# Patient Record
Sex: MALE | Race: WHITE | NOT HISPANIC OR LATINO | Employment: FULL TIME | URBAN - METROPOLITAN AREA
[De-identification: names, ages, dates, MRNs, and addresses within clinical notes are randomized per-mention and may not be internally consistent; named-entity substitution may affect disease eponyms.]

---

## 2017-02-07 PROBLEM — J01.00 ACUTE MAXILLARY SINUSITIS: Status: ACTIVE | Noted: 2017-02-07

## 2017-02-08 PROBLEM — H66.001 ACUTE SUPPURATIVE OTITIS MEDIA OF RIGHT EAR WITHOUT SPONTANEOUS RUPTURE OF TYMPANIC MEMBRANE: Status: ACTIVE | Noted: 2017-02-08

## 2017-05-15 PROBLEM — J01.00 ACUTE MAXILLARY SINUSITIS: Status: RESOLVED | Noted: 2017-02-07 | Resolved: 2017-05-15

## 2017-11-14 ENCOUNTER — OFFICE VISIT (OUTPATIENT)
Dept: FAMILY MEDICINE | Facility: CLINIC | Age: 62
End: 2017-11-14
Payer: COMMERCIAL

## 2017-11-14 ENCOUNTER — HOSPITAL ENCOUNTER (OUTPATIENT)
Dept: RADIOLOGY | Facility: HOSPITAL | Age: 62
Discharge: HOME | End: 2017-11-14
Attending: INTERNAL MEDICINE
Payer: COMMERCIAL

## 2017-11-14 VITALS
WEIGHT: 274.06 LBS | HEART RATE: 76 BPM | DIASTOLIC BLOOD PRESSURE: 88 MMHG | BODY MASS INDEX: 36.32 KG/M2 | SYSTOLIC BLOOD PRESSURE: 126 MMHG | TEMPERATURE: 98 F | HEIGHT: 73 IN

## 2017-11-14 DIAGNOSIS — J30.89 CHRONIC NON-SEASONAL ALLERGIC RHINITIS, UNSPECIFIED TRIGGER: ICD-10-CM

## 2017-11-14 DIAGNOSIS — M19.049 HAND ARTHRITIS: ICD-10-CM

## 2017-11-14 DIAGNOSIS — Z00.00 PREVENTATIVE HEALTH CARE: ICD-10-CM

## 2017-11-14 DIAGNOSIS — E66.01 MORBID OBESITY: ICD-10-CM

## 2017-11-14 DIAGNOSIS — E29.1 HYPOGONADISM IN MALE: ICD-10-CM

## 2017-11-14 DIAGNOSIS — I10 ESSENTIAL HYPERTENSION: ICD-10-CM

## 2017-11-14 DIAGNOSIS — E78.2 MIXED HYPERLIPIDEMIA: Primary | ICD-10-CM

## 2017-11-14 DIAGNOSIS — R94.31 ABNORMAL EKG: ICD-10-CM

## 2017-11-14 DIAGNOSIS — Z90.49 HISTORY OF PARTIAL COLECTOMY: ICD-10-CM

## 2017-11-14 DIAGNOSIS — K21.9 GASTROESOPHAGEAL REFLUX DISEASE, ESOPHAGITIS PRESENCE NOT SPECIFIED: ICD-10-CM

## 2017-11-14 DIAGNOSIS — Z23 IMMUNIZATION DUE: ICD-10-CM

## 2017-11-14 DIAGNOSIS — F41.9 CHRONIC ANXIETY: ICD-10-CM

## 2017-11-14 PROCEDURE — 90471 IMMUNIZATION ADMIN: CPT | Mod: S$GLB,,, | Performed by: INTERNAL MEDICINE

## 2017-11-14 PROCEDURE — 73130 X-RAY EXAM OF HAND: CPT | Mod: 50,TC

## 2017-11-14 PROCEDURE — 99999 PR PBB SHADOW E&M-NEW PATIENT-LVL III: CPT | Mod: PBBFAC,,, | Performed by: INTERNAL MEDICINE

## 2017-11-14 PROCEDURE — 99204 OFFICE O/P NEW MOD 45 MIN: CPT | Mod: 25,S$GLB,, | Performed by: INTERNAL MEDICINE

## 2017-11-14 PROCEDURE — 90686 IIV4 VACC NO PRSV 0.5 ML IM: CPT | Mod: S$GLB,,, | Performed by: INTERNAL MEDICINE

## 2017-11-14 PROCEDURE — 90715 TDAP VACCINE 7 YRS/> IM: CPT | Mod: S$GLB,,, | Performed by: INTERNAL MEDICINE

## 2017-11-14 PROCEDURE — 73130 X-RAY EXAM OF HAND: CPT | Mod: 26,50,, | Performed by: RADIOLOGY

## 2017-11-14 PROCEDURE — 90472 IMMUNIZATION ADMIN EACH ADD: CPT | Mod: S$GLB,,, | Performed by: INTERNAL MEDICINE

## 2017-11-14 RX ORDER — SERTRALINE HYDROCHLORIDE 100 MG/1
100 TABLET, FILM COATED ORAL DAILY
Qty: 30 TABLET | Refills: 5 | Status: SHIPPED | OUTPATIENT
Start: 2017-11-14 | End: 2018-02-15 | Stop reason: SDUPTHER

## 2017-11-14 RX ORDER — PANTOPRAZOLE SODIUM 40 MG/1
40 TABLET, DELAYED RELEASE ORAL DAILY
Qty: 30 TABLET | Refills: 11 | Status: SHIPPED | OUTPATIENT
Start: 2017-11-14 | End: 2018-02-15 | Stop reason: SDUPTHER

## 2017-11-14 RX ORDER — LISINOPRIL AND HYDROCHLOROTHIAZIDE 20; 25 MG/1; MG/1
1 TABLET ORAL DAILY
Qty: 30 TABLET | Refills: 5 | Status: SHIPPED | OUTPATIENT
Start: 2017-11-14 | End: 2018-02-15 | Stop reason: SDUPTHER

## 2017-11-14 RX ORDER — METOPROLOL SUCCINATE 50 MG/1
50 TABLET, EXTENDED RELEASE ORAL NIGHTLY
Qty: 30 TABLET | Refills: 5 | Status: SHIPPED | OUTPATIENT
Start: 2017-11-14 | End: 2018-02-15 | Stop reason: SDUPTHER

## 2017-11-14 RX ORDER — ATORVASTATIN CALCIUM 40 MG/1
40 TABLET, FILM COATED ORAL DAILY
Qty: 30 TABLET | Refills: 11 | Status: SHIPPED | OUTPATIENT
Start: 2017-11-14 | End: 2018-02-15 | Stop reason: SDUPTHER

## 2017-11-14 RX ORDER — ATENOLOL 50 MG/1
TABLET ORAL
COMMUNITY
Start: 2017-10-25 | End: 2017-11-14

## 2017-11-14 NOTE — PROGRESS NOTES
Assessment and Plan:    1. Mixed hyperlipidemia  - atorvastatin (LIPITOR) 40 MG tablet; Take 1 tablet (40 mg total) by mouth once daily.  Dispense: 30 tablet; Refill: 11    2. Gastroesophageal reflux disease, esophagitis presence not specified  - pantoprazole (PROTONIX) 40 MG tablet; Take 1 tablet (40 mg total) by mouth once daily.  Dispense: 30 tablet; Refill: 11    3. Essential hypertension  Controlled.   - Comprehensive metabolic panel; Future  - Comprehensive metabolic panel  - lisinopril-hydrochlorothiazide (PRINZIDE,ZESTORETIC) 20-25 mg Tab; Take 1 tablet by mouth once daily.  Dispense: 30 tablet; Refill: 5  - metoprolol succinate (TOPROL-XL) 50 MG 24 hr tablet; Take 1 tablet (50 mg total) by mouth every evening.  Dispense: 30 tablet; Refill: 5    4. Chronic anxiety  Well controlled  - sertraline (ZOLOFT) 100 MG tablet; Take 1 tablet (100 mg total) by mouth once daily.  Dispense: 30 tablet; Refill: 5    5. History of partial colectomy  2/2 diverticulitis, reports normal colonoscopy last year. Will obtain records.     6. Chronic non-seasonal allergic rhinitis, unspecified trigger  Continue OTC antihistamines    7. Abnormal EKG  Will obtain records, may need to refer to cardiology pending results.     8. Hypogonadism in male  Continue to follow up with Urologist, consider tapering off of testosterone.     9. Morbid obesity  Counseled extensively on need for diet changes and daily exercise.  Discussed examples of healthy eating.  Recommend at least 30 minutes of exercise at least 5 days a week.   - Comprehensive metabolic panel; Future  - Hemoglobin A1c; Future  - Comprehensive metabolic panel  - Hemoglobin A1c    10. [facility] health care  Had colonoscopy last year with colorectal surgeon, notes being told he needs to return in 5 years. Will send JODEE  - Comprehensive metabolic panel; Future  - Hemoglobin A1c; Future  - Hepatitis C antibody; Future  - Tdap Vaccine    11. Hand arthritis  Most likely OA, but  "with MCP swelling, some concern for inflammatory arthritis, though patient is not typical   - X-Ray Hand Complete Right; Future    12. Immunization due      ______________________________________________________________________  Subjective:    Chief Complaint:  Annual, establish care    HPI:  Nilesh is a 62 y.o. year old man, previously patient of Dr. Johnson at [facility], here to establish care.     He notes that he has been bothered by pain in the joints of his bilateral hands, mainly MCP joints, for the last ~3 months. He works a lot with his hands, and the pain is getting to the point that it is limiting his activity.     HTN- Takes metoprolol, lisinopril, and HCTZ, BP has been typically well controlled on this regimen.     HLD- Taking atorvastatin, doing well on this.     Anxiety- Taking sertraline and notes that he is doing very well on this.     GERD- Doing well on protonix, reports having an EGD last year with his GI doctor.      Allergic rhinosinusitis- Taking fexofenadine OTC and doing well on this medication.     Taking testosterone for about 6 years prescribed by his Urologist.     He has seen a cardiologist before for evaluation of an abnormal EKG. He notes that he has been told his EKG looks like he had a previous MI. He was evaluated by [facility] stress test which he was told was abnormal in some way and they recommended an angiogram. He reports that he had an angiogram and was told that he had some "plaque build up" but that there was nothing significant.       Past Medical History:  Past Medical History:   Diagnosis Date    Allergic rhinosinusitis     Anxiety     Diverticulitis     s/p partial colectomy    GERD (gastroesophageal reflux disease)     Hyperlipidemia     Hypertension     Hypogonadism in male        Past Surgical History:  Past Surgical History:   Procedure Laterality Date    COLECTOMY      UPPER GASTROINTESTINAL ENDOSCOPY         Family History:  Family History   Problem Relation Age " of Onset    Heart disease Mother     Heart disease Father     Heart disease Sister     Atrial fibrillation Sister     Heart disease Brother        Social History:  Social History     Social History    Marital status:      Spouse name: N/A    Number of children: N/A    Years of education: N/A     Social History Main Topics    Smoking status: Never Smoker    Smokeless tobacco: Never Used    Alcohol use 8.4 - 9.6 oz/week     14 - 16 Standard drinks or equivalent per week    Drug use: No    Sexual activity: Yes     Partners: Female     Other Topics Concern    None     Social History Narrative    Owns company making leather belts.        Medications:  Current Outpatient Prescriptions on File Prior to Visit   Medication Sig Dispense Refill    atorvastatin (LIPITOR) 40 MG tablet TAKE 1 TABLET BY MOUTH EVERY DAY 90 tablet 1    fexofenadine (ALLEGRA) 180 MG tablet Take 1 tablet (180 mg total) by mouth once daily. 90 tablet 1    lisinopril-hydrochlorothiazide (PRINZIDE,ZESTORETIC) 20-25 mg Tab Take 1 tablet by mouth once daily. 90 tablet 3    metoprolol succinate (TOPROL-XL) 50 MG 24 hr tablet TAKE 1 TABLET BY MOUTH EVERY EVENING 90 tablet 1    pantoprazole (PROTONIX) 40 MG tablet TAKE 1 TABLET BY MOUTH EVERY DAY 30 tablet 0    sertraline (ZOLOFT) 100 MG tablet Take 1 tablet (100 mg total) by mouth once daily. 90 tablet 3    testosterone cypionate (DEPOTESTOTERONE CYPIONATE) 200 mg/mL injection   1    [DISCONTINUED] guaifenesin (MUCINEX) 600 mg 12 hr tablet Take 1 tablet (600 mg total) by mouth 2 (two) times daily. 60 tablet 2     No current facility-administered medications on file prior to visit.        Allergies:  Flonase [fluticasone]    Immunizations:  Immunization History   Administered Date(s) Administered    Zoster 09/15/2016       Review of Systems:  Review of Systems   Constitutional: Negative for chills, fever and unexpected weight change.   HENT: Negative for congestion and trouble  "swallowing.    Eyes: Negative for pain and visual disturbance.   Respiratory: Negative for shortness of breath and wheezing.    Cardiovascular: Negative for chest pain and leg swelling.   Gastrointestinal: Negative for abdominal pain, constipation and diarrhea.   Endocrine: Negative for cold intolerance and heat intolerance.   Genitourinary: Negative for difficulty urinating and hematuria.   Musculoskeletal: Positive for arthralgias, back pain and joint swelling.   Skin: Negative for rash and wound.   Allergic/Immunologic: Negative for environmental allergies and food allergies.   Neurological: Negative for seizures and syncope.   Psychiatric/Behavioral: Negative for dysphoric mood. The patient is not nervous/anxious.        Objective:    Vitals:  Vitals:    11/14/17 1458   BP: 126/88   Pulse: 76   Temp: 98.1 °F (36.7 °C)   Weight: 124.3 kg (274 lb 0.5 oz)   Height: 6' 1" (1.854 m)   PainSc:   3   PainLoc: Back       Body mass index is 36.15 kg/m².      Physical Exam   Constitutional: He is oriented to person, place, and time. He appears well-developed and well-nourished. No distress.   HENT:   Mouth/Throat: Oropharynx is clear and moist.   Eyes: No scleral icterus.   Cardiovascular: Normal rate and regular rhythm.    No murmur heard.  Pulmonary/Chest: Effort normal and breath sounds normal. No respiratory distress. He has no wheezes.   Musculoskeletal: He exhibits no edema.   Bilateral hands reveal some swelling of MCP and DIP joints, pain with palpation of multiple MCP and DIP joints, less so with PIP joints   Neurological: He is alert and oriented to person, place, and time.   Skin: Skin is warm and dry.   Psychiatric: He has a normal mood and affect. His behavior is normal.   Vitals reviewed.      Data:  Previous labs reviewed and pertinent for elevated fasting glucose (111), elevated TG, good LDL.        Flavia Lange MD  Internal Medicine    "

## 2017-11-16 ENCOUNTER — TELEPHONE (OUTPATIENT)
Dept: FAMILY MEDICINE | Facility: CLINIC | Age: 62
End: 2017-11-16

## 2017-11-16 NOTE — TELEPHONE ENCOUNTER
----- Message from Alecia Smith RT sent at 11/16/2017 12:07 PM CST -----  Contact: pt    Called pod, pt , returned missed call, thanks.

## 2017-11-23 LAB
ALBUMIN SERPL-MCNC: 4.5 G/DL (ref 3.6–4.8)
ALBUMIN/GLOB SERPL: 2 {RATIO} (ref 1.2–2.2)
ALP SERPL-CCNC: 91 IU/L (ref 39–117)
ALT SERPL-CCNC: 33 IU/L (ref 0–44)
AST SERPL-CCNC: 27 IU/L (ref 0–40)
BILIRUB SERPL-MCNC: 0.3 MG/DL (ref 0–1.2)
BUN SERPL-MCNC: 19 MG/DL (ref 8–27)
BUN/CREAT SERPL: 18 (ref 10–24)
CALCIUM SERPL-MCNC: 9.2 MG/DL (ref 8.6–10.2)
CHLORIDE SERPL-SCNC: 97 MMOL/L (ref 96–106)
CO2 SERPL-SCNC: 28 MMOL/L (ref 18–29)
CREAT SERPL-MCNC: 1.07 MG/DL (ref 0.76–1.27)
GFR SERPLBLD CREATININE-BSD FMLA CKD-EPI: 73 ML/MIN/1.73
GFR SERPLBLD CREATININE-BSD FMLA CKD-EPI: 84 ML/MIN/1.73
GLOBULIN SER CALC-MCNC: 2.3 G/DL (ref 1.5–4.5)
GLUCOSE SERPL-MCNC: 103 MG/DL (ref 65–99)
HBA1C MFR BLD: 5.9 % (ref 4.8–5.6)
HCV AB S/CO SERPL IA: <0.1 S/CO RATIO (ref 0–0.9)
POTASSIUM SERPL-SCNC: 5.1 MMOL/L (ref 3.5–5.2)
PROT SERPL-MCNC: 6.8 G/DL (ref 6–8.5)
SODIUM SERPL-SCNC: 139 MMOL/L (ref 134–144)

## 2017-11-28 ENCOUNTER — PATIENT MESSAGE (OUTPATIENT)
Dept: FAMILY MEDICINE | Facility: CLINIC | Age: 62
End: 2017-11-28

## 2017-11-28 DIAGNOSIS — M54.40 ACUTE LOW BACK PAIN WITH SCIATICA, SCIATICA LATERALITY UNSPECIFIED, UNSPECIFIED BACK PAIN LATERALITY: Primary | ICD-10-CM

## 2017-11-30 ENCOUNTER — TELEPHONE (OUTPATIENT)
Dept: NEUROSURGERY | Facility: CLINIC | Age: 62
End: 2017-11-30

## 2017-11-30 NOTE — TELEPHONE ENCOUNTER
Spoke with patient and scheduled an appointment with Dr. Thomas for 12-4-17, and patient indicated understanding.

## 2017-11-30 NOTE — TELEPHONE ENCOUNTER
----- Message from Lisa Ivy sent at 11/30/2017 10:33 AM CST -----  Contact: Phil  Calling in regards to back and lumbar pain. He has had mri and other tests done. He has diagnosis, but cannot say them to me. Please call [phone] (home)   Thanks!

## 2017-12-04 ENCOUNTER — OFFICE VISIT (OUTPATIENT)
Dept: NEUROSURGERY | Facility: CLINIC | Age: 62
End: 2017-12-04
Payer: COMMERCIAL

## 2017-12-04 VITALS
SYSTOLIC BLOOD PRESSURE: 125 MMHG | WEIGHT: 267 LBS | DIASTOLIC BLOOD PRESSURE: 82 MMHG | TEMPERATURE: 98 F | HEART RATE: 93 BPM | BODY MASS INDEX: 35.39 KG/M2 | HEIGHT: 73 IN

## 2017-12-04 DIAGNOSIS — M51.16 LUMBAR DISC HERNIATION WITH RADICULOPATHY: ICD-10-CM

## 2017-12-04 DIAGNOSIS — M48.061 SPINAL STENOSIS OF LUMBAR REGION, UNSPECIFIED WHETHER NEUROGENIC CLAUDICATION PRESENT: Primary | ICD-10-CM

## 2017-12-04 DIAGNOSIS — M48.061 LUMBAR STENOSIS: ICD-10-CM

## 2017-12-04 PROCEDURE — 99999 PR PBB SHADOW E&M-EST. PATIENT-LVL III: CPT | Mod: PBBFAC,,, | Performed by: NEUROLOGICAL SURGERY

## 2017-12-04 PROCEDURE — 99205 OFFICE O/P NEW HI 60 MIN: CPT | Mod: S$GLB,,, | Performed by: NEUROLOGICAL SURGERY

## 2017-12-04 RX ORDER — LIDOCAINE HYDROCHLORIDE 10 MG/ML
1 INJECTION, SOLUTION EPIDURAL; INFILTRATION; INTRACAUDAL; PERINEURAL ONCE
Status: CANCELLED | OUTPATIENT
Start: 2017-12-04 | End: 2017-12-04

## 2017-12-04 RX ORDER — SODIUM CHLORIDE, SODIUM LACTATE, POTASSIUM CHLORIDE, CALCIUM CHLORIDE 600; 310; 30; 20 MG/100ML; MG/100ML; MG/100ML; MG/100ML
INJECTION, SOLUTION INTRAVENOUS CONTINUOUS
Status: CANCELLED | OUTPATIENT
Start: 2017-12-04

## 2017-12-04 NOTE — PROGRESS NOTES
Neurosurgery History and Physical    Patient ID: Nilesh Sanchez is a 62 y.o. male.    Chief Complaint   Patient presents with    Lumbar Spine Pain (L-Spine)     pain score is a 9. Lower back. numbness & tingling down left leg and just started down right.         Review of Systems   Constitutional: Positive for activity change.   HENT: Negative.    Eyes: Negative.    Respiratory: Negative.    Cardiovascular: Negative.    Gastrointestinal: Negative.    Endocrine: Negative.    Genitourinary: Negative for difficulty urinating and enuresis.   Musculoskeletal: Positive for back pain and gait problem.   Allergic/Immunologic: Negative.    Neurological: Positive for weakness and numbness.   Hematological: Negative.    Psychiatric/Behavioral: Negative.        Past Medical History:   Diagnosis Date    Allergic rhinosinusitis     Anxiety     Back pain     with spasms    Diverticulitis     s/p partial colectomy    GERD (gastroesophageal reflux disease)     Hyperlipidemia     Hypertension      Social History     Social History    Marital status:      Spouse name: N/A    Number of children: N/A    Years of education: N/A     Occupational History    Not on file.     Social History Main Topics    Smoking status: Never Smoker    Smokeless tobacco: Never Used    Alcohol use 8.4 - 9.6 oz/week     14 - 16 Standard drinks or equivalent per week    Drug use: No    Sexual activity: Yes     Partners: Female     Other Topics Concern    Not on file     Social History Narrative    Owns company making leather belts.      Family History   Problem Relation Age of Onset    Heart disease Mother     Heart disease Father     Heart disease Sister     Atrial fibrillation Sister     Heart disease Brother      Review of patient's allergies indicates:   Allergen Reactions    Flonase [fluticasone] Other (See Comments)     inflammation , sx seemed to worsen over time rather than improve       Current Outpatient Prescriptions:  "    atorvastatin (LIPITOR) 40 MG tablet, Take 1 tablet (40 mg total) by mouth once daily., Disp: 30 tablet, Rfl: 11    diclofenac (VOLTAREN) 75 MG EC tablet, Take 1 tablet (75 mg total) by mouth 2 (two) times daily. With food, Disp: 1 tablet, Rfl: 1    fexofenadine (ALLEGRA) 180 MG tablet, Take 1 tablet (180 mg total) by mouth once daily., Disp: 90 tablet, Rfl: 1    lisinopril-hydrochlorothiazide (PRINZIDE,ZESTORETIC) 20-25 mg Tab, Take 1 tablet by mouth once daily., Disp: 30 tablet, Rfl: 5    methylPREDNISolone (MEDROL DOSEPACK) 4 mg tablet, Take as directed, Disp: 1 Package, Rfl: 0    methylPREDNISolone (MEDROL DOSEPACK) 4 mg tablet, Take as directed, Disp: 1 Package, Rfl: 0    metoprolol succinate (TOPROL-XL) 50 MG 24 hr tablet, Take 1 tablet (50 mg total) by mouth every evening., Disp: 30 tablet, Rfl: 5    pantoprazole (PROTONIX) 40 MG tablet, Take 1 tablet (40 mg total) by mouth once daily., Disp: 30 tablet, Rfl: 11    sertraline (ZOLOFT) 100 MG tablet, Take 1 tablet (100 mg total) by mouth once daily., Disp: 30 tablet, Rfl: 5    testosterone cypionate (DEPOTESTOTERONE CYPIONATE) 200 mg/mL injection, , Disp: , Rfl: 1    traMADol (ULTRAM) 50 mg tablet, Take 1 tablet (50 mg total) by mouth every 6 (six) hours as needed for Pain., Disp: 22 tablet, Rfl: 0  Blood pressure 125/82, pulse 93, temperature 98.1 °F (36.7 °C), height 6' 1" (1.854 m), weight 121.1 kg (266 lb 15.6 oz).      Neurologic Exam     Mental Status   Oriented to person, place, and time.   Attention: normal. Concentration: normal.   Speech: speech is normal   Level of consciousness: alert  Knowledge: good.     Cranial Nerves     CN II   Visual acuity: normal    CN III, IV, VI   Pupils are equal, round, and reactive to light.  Extraocular motions are normal.     CN V   Facial sensation intact.     CN VII   Facial expression full, symmetric.     CN VIII   Hearing: intact    CN IX, X   Palate: symmetric    CN XI   CN XI normal.     CN XII "   CN XII normal.     Motor Exam   Muscle bulk: normal  Overall muscle tone: normal  Right arm pronator drift: absent  Left arm pronator drift: absent    Strength   Right deltoid: 5/5  Left deltoid: 5/5  Right biceps: 5/5  Left biceps: 5/5  Right triceps: 5/5  Left triceps: 5/5  Right wrist flexion: 5/5  Left wrist flexion: 5/5  Right wrist extension: 5/5  Left wrist extension: 5/5  Right interossei: 5/5  Left interossei: 5/5  Right iliopsoas: 5/5  Left iliopsoas: 4/5  Right quadriceps: 5/5  Left quadriceps: 4/5  Right hamstrin/5  Left hamstrin/5  Right anterior tibial: 4/5  Left anterior tibial: 1/5  Right posterior tibial: 5/5  Left posterior tibial: 1/5  Right peroneal: 4/5  Left peroneal: 1/5  Right gastroc: 5/5  Left gastroc: 4/5    Sensory Exam   Light touch normal.   Right arm light touch: normal  Left arm light touch: normal  Sensory deficit distribution on right: L5  Sensory deficit distribution on left: L5    Gait, Coordination, and Reflexes     Gait  Gait: normal    Coordination   Romberg: negative  Finger to nose coordination: normal    Tremor   Resting tremor: absent    Reflexes   Right brachioradialis: 0  Left brachioradialis: 2+  Right biceps: 1+  Left biceps: 2+  Right triceps: 1+  Left triceps: 1+  Right patellar: 2+  Left patellar: 2+  Right achilles: 0  Left achilles: 0  Right plantar: normal  Left plantar: normal  Right Vazquez: absent  Left Vazquez: absent  Right ankle clonus: absent  Left ankle clonus: absent      Physical Exam   Constitutional: He is oriented to person, place, and time. He appears well-developed and well-nourished.   HENT:   Head: Normocephalic and atraumatic.   Eyes: EOM are normal. Pupils are equal, round, and reactive to light.   Neck: Normal range of motion. Neck supple.   Cardiovascular: Normal rate and regular rhythm.    Pulmonary/Chest: Effort normal.   Abdominal: Soft.   Musculoskeletal: Normal range of motion.   Neurological: He is alert and oriented to person,  "place, and time. He has a normal Finger-Nose-Finger Test and a normal Romberg Test. Gait normal.   Reflex Scores:       Tricep reflexes are 1+ on the right side and 1+ on the left side.       Bicep reflexes are 1+ on the right side and 2+ on the left side.       Brachioradialis reflexes are 0 on the right side and 2+ on the left side.       Patellar reflexes are 2+ on the right side and 2+ on the left side.       Achilles reflexes are 0 on the right side and 0 on the left side.  Skin: Skin is warm and dry.   Psychiatric: He has a normal mood and affect. His speech is normal and behavior is normal. Judgment and thought content normal.   Nursing note and vitals reviewed.      Vital Signs  Temp: 98.1 °F (36.7 °C)  Pulse: 93  BP: 125/82  Pain Score:   9 (lower )  Pain Loc: Back  Height and Weight  Height: 6' 1" (185.4 cm)  Weight: 121.1 kg (266 lb 15.6 oz)  BSA (Calculated - sq m): 2.5 sq meters  BMI (Calculated): 35.3  Weight in (lb) to have BMI = 25: 189.1]    Provider dictation:  He describes the acute onset of low back, bilateral buttock and left leg pain and numbness about a week ago. Initially the pain was only in his left leg, but it has been gradually spreading to his right leg as well in the same distribution. He also began having progressive severe left ankle dorsiflexion weakness. He was given pain medication and a Medrol Dose Pack with no improvement. At this point he denies any bowel or bladder dysfunction or saddle anesthesia. I reviewed the imaging. He has a very large L4-L5 extruded disc fragment causing severe central stenosis and compression of the cauda equina. A large portion of it is excentric to the left, resulting in severe left lateral recess stenosis. There is also a focal portion on the right causing severe right lateral recess stenosis. Clinically, he has almost a complete left foot drop, some right-sided L5 myotomal weakness, bilateral L5 numbness L>R, diffuse left lower extremity weakness in " the other muscle groups as well, which most likely has a pain component.     Severe bilateral L5 radiculoapthy. I am concerned about his dense foot drop, his progressive symptoms and the new involvement of the right leg. He is not a candidate for conservative measures. He is at risk for cauda equina syndrome if he is not surgically decompressed. I have offered him a minimally invasive bilateral L4-L5 microdiscectomy. I have discussed the risks, benefits and alternatives to the proposed operation in detail. All questions were answered. Risks discussed include but are not limited to: bleeding, infection, spinal fluid leak, injury to the nerves, weakness, numbness, paralysis, loss of bowel or bladder function, injury to the blood vessels, stroke, heart attack, blood clots, destabilization, no improvement or worsening of symptoms, re-herniation, need for more surgery including fusion, death. He wishes to proceed.     He is aware that he should call 911 or come the ER for any signs or symptoms of cauda equina syndrome, including saddle anesthesia and bowel or bladder dysfunction.       Visit Diagnosis:  Lumbar disc herniation with radiculopathy

## 2017-12-04 NOTE — LETTER
December 4, 2017    Nilesh Sanchez  [address] [address]             [facility]  [address] [facility] [address] [place] [address]  Phone: [phone]  Fax: [phone] To Whom It May Concern,    Nilesh Sanchez will be having surgery on 12/13/17 with Dr. Thomas, Neurosurgeon, at [facility]. We are reaching out to obtain a surgical clearance from Dr. Lange to ensure the safety of our patient. The surgery is a minimally invasive L4-L5 bilateral microdiscectomy and will be under general anesthesia. This procedure typically lasts approximately 1-2 hours. We request that the patient hold all anticoagulant/antiinflammatory medications for 5-7 days prior to surgery. Please let us know if our office can be of further assistance.     Thank you,     Nia Ellison LPN

## 2017-12-04 NOTE — LETTER
December 4, 2017      [facility]  [address] [facility] [address]           [facility]  [address] [facility] [address]  Phone: [phone]  Fax: [phone]          Patient: Nilesh Sanchez   MR Number: [id number]   YOB: 1955   Date of Visit: 12/4/2017       Dear [facility]:    Thank you for referring Nilesh Sanchez to me for evaluation. Attached you will find relevant portions of my assessment and plan of care.    If you have questions, please do not hesitate to call me. I look forward to following Nilesh Sanchez along with you.    Sincerely,    Keyanna Thomas MD    Enclosure  CC:  No Recipients    If you would like to receive this communication electronically, please contact [email or web address] or [phone] to request more information on [employer] access.    For providers and/or their staff who would like to refer a patient to [facility], please contact us through our one-stop-shop provider referral line, [facility], at [phone].    If you feel you have received this communication in error or would no longer like to receive these types of communications, please e-mail [email or web address]

## 2017-12-06 ENCOUNTER — INITIAL CONSULT (OUTPATIENT)
Dept: ORTHOPEDICS | Facility: CLINIC | Age: 62
End: 2017-12-06
Payer: COMMERCIAL

## 2017-12-06 DIAGNOSIS — M51.16 LUMBAR DISC HERNIATION WITH RADICULOPATHY: Primary | ICD-10-CM

## 2017-12-06 PROCEDURE — 99204 OFFICE O/P NEW MOD 45 MIN: CPT | Mod: S$GLB,,, | Performed by: PHYSICIAN ASSISTANT

## 2017-12-06 PROCEDURE — 99999 PR PBB SHADOW E&M-EST. PATIENT-LVL III: CPT | Mod: PBBFAC,,, | Performed by: PHYSICIAN ASSISTANT

## 2017-12-06 RX ORDER — MUPIROCIN 20 MG/G
OINTMENT TOPICAL
Status: CANCELLED | OUTPATIENT
Start: 2017-12-06

## 2017-12-06 RX ORDER — SODIUM CHLORIDE 9 MG/ML
INJECTION, SOLUTION INTRAVENOUS CONTINUOUS
Status: CANCELLED | OUTPATIENT
Start: 2017-12-06

## 2017-12-07 ENCOUNTER — ANESTHESIA EVENT (OUTPATIENT)
Dept: SURGERY | Facility: HOSPITAL | Age: 62
End: 2017-12-07
Payer: COMMERCIAL

## 2017-12-07 ENCOUNTER — HOSPITAL ENCOUNTER (OUTPATIENT)
Facility: HOSPITAL | Age: 62
LOS: 1 days | Discharge: HOME | End: 2017-12-08
Attending: ORTHOPAEDIC SURGERY | Admitting: ORTHOPAEDIC SURGERY
Payer: COMMERCIAL

## 2017-12-07 ENCOUNTER — ANESTHESIA (OUTPATIENT)
Dept: SURGERY | Facility: HOSPITAL | Age: 62
End: 2017-12-07
Payer: COMMERCIAL

## 2017-12-07 DIAGNOSIS — M51.16 LUMBAR DISC HERNIATION WITH RADICULOPATHY: ICD-10-CM

## 2017-12-07 PROCEDURE — 37000009 HC ANESTHESIA EA ADD 15 MINS: Performed by: ORTHOPAEDIC SURGERY

## 2017-12-07 PROCEDURE — 37000008 HC ANESTHESIA 1ST 15 MINUTES: Performed by: ORTHOPAEDIC SURGERY

## 2017-12-07 PROCEDURE — 25000003 PHARM REV CODE 250: Performed by: ORTHOPAEDIC SURGERY

## 2017-12-07 PROCEDURE — 63600175 PHARM REV CODE 636 W HCPCS: Performed by: NURSE ANESTHETIST, CERTIFIED REGISTERED

## 2017-12-07 PROCEDURE — 63600175 PHARM REV CODE 636 W HCPCS: Performed by: STUDENT IN AN ORGANIZED HEALTH CARE EDUCATION/TRAINING PROGRAM

## 2017-12-07 PROCEDURE — 36000710: Performed by: ORTHOPAEDIC SURGERY

## 2017-12-07 PROCEDURE — 63600175 PHARM REV CODE 636 W HCPCS: Performed by: ANESTHESIOLOGY

## 2017-12-07 PROCEDURE — 25000003 PHARM REV CODE 250: Performed by: NURSE ANESTHETIST, CERTIFIED REGISTERED

## 2017-12-07 PROCEDURE — 63030 LAMOT DCMPRN NRV RT 1 LMBR: CPT | Mod: RT,,, | Performed by: ORTHOPAEDIC SURGERY

## 2017-12-07 PROCEDURE — 27201423 OPTIME MED/SURG SUP & DEVICES STERILE SUPPLY: Performed by: ORTHOPAEDIC SURGERY

## 2017-12-07 PROCEDURE — D9220A PRA ANESTHESIA: Mod: ANES,,, | Performed by: ANESTHESIOLOGY

## 2017-12-07 PROCEDURE — D9220A PRA ANESTHESIA: Mod: CRNA,,, | Performed by: NURSE ANESTHETIST, CERTIFIED REGISTERED

## 2017-12-07 PROCEDURE — 63600175 PHARM REV CODE 636 W HCPCS: Performed by: ORTHOPAEDIC SURGERY

## 2017-12-07 PROCEDURE — 36000711: Performed by: ORTHOPAEDIC SURGERY

## 2017-12-07 PROCEDURE — 25000003 PHARM REV CODE 250: Performed by: STUDENT IN AN ORGANIZED HEALTH CARE EDUCATION/TRAINING PROGRAM

## 2017-12-07 PROCEDURE — 71000033 HC RECOVERY, INTIAL HOUR: Performed by: ORTHOPAEDIC SURGERY

## 2017-12-07 PROCEDURE — 63600175 PHARM REV CODE 636 W HCPCS

## 2017-12-07 PROCEDURE — C9290 INJ, BUPIVACAINE LIPOSOME: HCPCS | Performed by: ORTHOPAEDIC SURGERY

## 2017-12-07 PROCEDURE — 25000003 PHARM REV CODE 250

## 2017-12-07 PROCEDURE — 71000039 HC RECOVERY, EACH ADD'L HOUR: Performed by: ORTHOPAEDIC SURGERY

## 2017-12-07 RX ORDER — PROMETHAZINE HYDROCHLORIDE 12.5 MG/1
25 TABLET ORAL EVERY 6 HOURS PRN
Status: DISCONTINUED | OUTPATIENT
Start: 2017-12-07 | End: 2017-12-08 | Stop reason: HOSPADM

## 2017-12-07 RX ORDER — NEOSTIGMINE METHYLSULFATE 1 MG/ML
INJECTION, SOLUTION INTRAVENOUS
Status: DISCONTINUED | OUTPATIENT
Start: 2017-12-07 | End: 2017-12-07

## 2017-12-07 RX ORDER — ONDANSETRON 8 MG/1
8 TABLET, ORALLY DISINTEGRATING ORAL EVERY 8 HOURS PRN
Qty: 42 TABLET | Refills: 0 | Status: SHIPPED | OUTPATIENT
Start: 2017-12-07 | End: 2017-12-09 | Stop reason: SDUPTHER

## 2017-12-07 RX ORDER — GLYCOPYRROLATE 0.2 MG/ML
INJECTION INTRAMUSCULAR; INTRAVENOUS
Status: DISCONTINUED | OUTPATIENT
Start: 2017-12-07 | End: 2017-12-07

## 2017-12-07 RX ORDER — ATORVASTATIN CALCIUM 20 MG/1
40 TABLET, FILM COATED ORAL NIGHTLY
Status: DISCONTINUED | OUTPATIENT
Start: 2017-12-07 | End: 2017-12-08 | Stop reason: HOSPADM

## 2017-12-07 RX ORDER — METHOCARBAMOL 500 MG/1
1500 TABLET, FILM COATED ORAL 4 TIMES DAILY
Status: DISCONTINUED | OUTPATIENT
Start: 2017-12-07 | End: 2017-12-08 | Stop reason: HOSPADM

## 2017-12-07 RX ORDER — ACETAMINOPHEN 10 MG/ML
INJECTION, SOLUTION INTRAVENOUS
Status: COMPLETED
Start: 2017-12-07 | End: 2017-12-07

## 2017-12-07 RX ORDER — OXYCODONE HYDROCHLORIDE 5 MG/1
10 TABLET ORAL
Status: DISCONTINUED | OUTPATIENT
Start: 2017-12-07 | End: 2017-12-08 | Stop reason: HOSPADM

## 2017-12-07 RX ORDER — CEFAZOLIN SODIUM 2 G/50ML
2 SOLUTION INTRAVENOUS
Status: COMPLETED | OUTPATIENT
Start: 2017-12-07 | End: 2017-12-08

## 2017-12-07 RX ORDER — MUPIROCIN 20 MG/G
OINTMENT TOPICAL 2 TIMES DAILY
Status: DISCONTINUED | OUTPATIENT
Start: 2017-12-07 | End: 2017-12-08 | Stop reason: HOSPADM

## 2017-12-07 RX ORDER — POLYETHYLENE GLYCOL 3350 17 G/17G
17 POWDER, FOR SOLUTION ORAL DAILY
Status: DISCONTINUED | OUTPATIENT
Start: 2017-12-08 | End: 2017-12-08 | Stop reason: HOSPADM

## 2017-12-07 RX ORDER — PANTOPRAZOLE SODIUM 40 MG/1
40 TABLET, DELAYED RELEASE ORAL DAILY
Status: DISCONTINUED | OUTPATIENT
Start: 2017-12-08 | End: 2017-12-08 | Stop reason: HOSPADM

## 2017-12-07 RX ORDER — SODIUM CHLORIDE 0.9 % (FLUSH) 0.9 %
3 SYRINGE (ML) INJECTION
Status: DISCONTINUED | OUTPATIENT
Start: 2017-12-07 | End: 2017-12-08 | Stop reason: HOSPADM

## 2017-12-07 RX ORDER — HYDRALAZINE HYDROCHLORIDE 10 MG/1
20 TABLET, FILM COATED ORAL ONCE
Status: COMPLETED | OUTPATIENT
Start: 2017-12-07 | End: 2017-12-07

## 2017-12-07 RX ORDER — METOPROLOL SUCCINATE 50 MG/1
50 TABLET, EXTENDED RELEASE ORAL NIGHTLY
Status: DISCONTINUED | OUTPATIENT
Start: 2017-12-07 | End: 2017-12-08 | Stop reason: HOSPADM

## 2017-12-07 RX ORDER — HYDROMORPHONE HYDROCHLORIDE 2 MG/ML
0.2 INJECTION, SOLUTION INTRAMUSCULAR; INTRAVENOUS; SUBCUTANEOUS EVERY 5 MIN PRN
Status: DISCONTINUED | OUTPATIENT
Start: 2017-12-07 | End: 2017-12-07 | Stop reason: HOSPADM

## 2017-12-07 RX ORDER — BISACODYL 10 MG
10 SUPPOSITORY, RECTAL RECTAL DAILY PRN
Status: DISCONTINUED | OUTPATIENT
Start: 2017-12-07 | End: 2017-12-08 | Stop reason: HOSPADM

## 2017-12-07 RX ORDER — HYDROMORPHONE HYDROCHLORIDE 2 MG/ML
0.5 INJECTION, SOLUTION INTRAMUSCULAR; INTRAVENOUS; SUBCUTANEOUS
Status: DISCONTINUED | OUTPATIENT
Start: 2017-12-07 | End: 2017-12-08 | Stop reason: HOSPADM

## 2017-12-07 RX ORDER — FENTANYL CITRATE 50 UG/ML
INJECTION, SOLUTION INTRAMUSCULAR; INTRAVENOUS
Status: DISCONTINUED | OUTPATIENT
Start: 2017-12-07 | End: 2017-12-07

## 2017-12-07 RX ORDER — LIDOCAINE HCL/PF 100 MG/5ML
SYRINGE (ML) INTRAVENOUS
Status: DISCONTINUED | OUTPATIENT
Start: 2017-12-07 | End: 2017-12-07

## 2017-12-07 RX ORDER — METHOCARBAMOL 750 MG/1
750 TABLET, FILM COATED ORAL 4 TIMES DAILY
Qty: 37 TABLET | Refills: 0 | Status: SHIPPED | OUTPATIENT
Start: 2017-12-07 | End: 2017-12-09 | Stop reason: SDUPTHER

## 2017-12-07 RX ORDER — BUPIVACAINE HYDROCHLORIDE AND EPINEPHRINE 2.5; 5 MG/ML; UG/ML
INJECTION, SOLUTION EPIDURAL; INFILTRATION; INTRACAUDAL; PERINEURAL
Status: DISCONTINUED | OUTPATIENT
Start: 2017-12-07 | End: 2017-12-07 | Stop reason: HOSPADM

## 2017-12-07 RX ORDER — PREGABALIN 75 MG/1
75 CAPSULE ORAL 2 TIMES DAILY
Status: DISCONTINUED | OUTPATIENT
Start: 2017-12-07 | End: 2017-12-08 | Stop reason: HOSPADM

## 2017-12-07 RX ORDER — PROPOFOL 10 MG/ML
VIAL (ML) INTRAVENOUS
Status: DISCONTINUED | OUTPATIENT
Start: 2017-12-07 | End: 2017-12-07

## 2017-12-07 RX ORDER — HYDRALAZINE HYDROCHLORIDE 10 MG/1
20 TABLET, FILM COATED ORAL EVERY 8 HOURS
Status: DISCONTINUED | OUTPATIENT
Start: 2017-12-07 | End: 2017-12-07

## 2017-12-07 RX ORDER — ACETAMINOPHEN 500 MG
1000 TABLET ORAL EVERY 6 HOURS
Status: DISCONTINUED | OUTPATIENT
Start: 2017-12-08 | End: 2017-12-08 | Stop reason: HOSPADM

## 2017-12-07 RX ORDER — ACETAMINOPHEN 10 MG/ML
1000 INJECTION, SOLUTION INTRAVENOUS EVERY 6 HOURS
Status: DISCONTINUED | OUTPATIENT
Start: 2017-12-07 | End: 2017-12-08 | Stop reason: HOSPADM

## 2017-12-07 RX ORDER — BACITRACIN 50000 [IU]/1
INJECTION, POWDER, FOR SOLUTION INTRAMUSCULAR
Status: DISCONTINUED | OUTPATIENT
Start: 2017-12-07 | End: 2017-12-07 | Stop reason: HOSPADM

## 2017-12-07 RX ORDER — CEFAZOLIN SODIUM 1 G/3ML
INJECTION, POWDER, FOR SOLUTION INTRAMUSCULAR; INTRAVENOUS
Status: DISCONTINUED | OUTPATIENT
Start: 2017-12-07 | End: 2017-12-07

## 2017-12-07 RX ORDER — SUCCINYLCHOLINE CHLORIDE 20 MG/ML
INJECTION INTRAMUSCULAR; INTRAVENOUS
Status: DISCONTINUED | OUTPATIENT
Start: 2017-12-07 | End: 2017-12-07

## 2017-12-07 RX ORDER — OXYCODONE HYDROCHLORIDE 5 MG/1
15 TABLET ORAL
Status: DISCONTINUED | OUTPATIENT
Start: 2017-12-07 | End: 2017-12-08 | Stop reason: HOSPADM

## 2017-12-07 RX ORDER — SODIUM CHLORIDE 9 MG/ML
INJECTION, SOLUTION INTRAVENOUS CONTINUOUS
Status: DISCONTINUED | OUTPATIENT
Start: 2017-12-07 | End: 2017-12-08 | Stop reason: HOSPADM

## 2017-12-07 RX ORDER — PHENYLEPHRINE HYDROCHLORIDE 10 MG/ML
INJECTION INTRAVENOUS
Status: DISCONTINUED | OUTPATIENT
Start: 2017-12-07 | End: 2017-12-07

## 2017-12-07 RX ORDER — OXYCODONE HYDROCHLORIDE 5 MG/1
5 TABLET ORAL
Status: DISCONTINUED | OUTPATIENT
Start: 2017-12-07 | End: 2017-12-08 | Stop reason: HOSPADM

## 2017-12-07 RX ORDER — OXYCODONE AND ACETAMINOPHEN 10; 325 MG/1; MG/1
1 TABLET ORAL EVERY 4 HOURS PRN
Qty: 97 TABLET | Refills: 0 | Status: SHIPPED | OUTPATIENT
Start: 2017-12-07 | End: 2017-12-09 | Stop reason: SDUPTHER

## 2017-12-07 RX ORDER — OXYCODONE HYDROCHLORIDE 5 MG/1
TABLET ORAL
Status: COMPLETED
Start: 2017-12-07 | End: 2017-12-07

## 2017-12-07 RX ORDER — ONDANSETRON 2 MG/ML
4 INJECTION INTRAMUSCULAR; INTRAVENOUS EVERY 12 HOURS PRN
Status: DISCONTINUED | OUTPATIENT
Start: 2017-12-07 | End: 2017-12-08 | Stop reason: HOSPADM

## 2017-12-07 RX ORDER — DOCUSATE SODIUM 100 MG/1
100 CAPSULE, LIQUID FILLED ORAL 3 TIMES DAILY
Status: DISCONTINUED | OUTPATIENT
Start: 2017-12-07 | End: 2017-12-08 | Stop reason: HOSPADM

## 2017-12-07 RX ORDER — MUPIROCIN 20 MG/G
OINTMENT TOPICAL
Status: DISCONTINUED | OUTPATIENT
Start: 2017-12-07 | End: 2017-12-07 | Stop reason: HOSPADM

## 2017-12-07 RX ORDER — ONDANSETRON 2 MG/ML
INJECTION INTRAMUSCULAR; INTRAVENOUS
Status: DISCONTINUED | OUTPATIENT
Start: 2017-12-07 | End: 2017-12-07

## 2017-12-07 RX ORDER — DIPHENHYDRAMINE HYDROCHLORIDE 50 MG/ML
25 INJECTION INTRAMUSCULAR; INTRAVENOUS EVERY 4 HOURS PRN
Status: DISCONTINUED | OUTPATIENT
Start: 2017-12-07 | End: 2017-12-08 | Stop reason: HOSPADM

## 2017-12-07 RX ORDER — DOCUSATE SODIUM 100 MG/1
100 CAPSULE, LIQUID FILLED ORAL 3 TIMES DAILY
Qty: 90 CAPSULE | Refills: 0 | Status: SHIPPED | OUTPATIENT
Start: 2017-12-07 | End: 2018-02-15

## 2017-12-07 RX ORDER — RAMELTEON 8 MG/1
8 TABLET ORAL NIGHTLY PRN
Status: DISCONTINUED | OUTPATIENT
Start: 2017-12-07 | End: 2017-12-08 | Stop reason: HOSPADM

## 2017-12-07 RX ORDER — ROCURONIUM BROMIDE 10 MG/ML
INJECTION, SOLUTION INTRAVENOUS
Status: DISCONTINUED | OUTPATIENT
Start: 2017-12-07 | End: 2017-12-07

## 2017-12-07 RX ORDER — MIDAZOLAM HYDROCHLORIDE 1 MG/ML
INJECTION, SOLUTION INTRAMUSCULAR; INTRAVENOUS
Status: DISCONTINUED | OUTPATIENT
Start: 2017-12-07 | End: 2017-12-07

## 2017-12-07 RX ORDER — SERTRALINE HYDROCHLORIDE 50 MG/1
100 TABLET, FILM COATED ORAL DAILY
Status: DISCONTINUED | OUTPATIENT
Start: 2017-12-08 | End: 2017-12-08 | Stop reason: HOSPADM

## 2017-12-07 RX ORDER — KETAMINE HCL IN 0.9 % NACL 50 MG/5 ML
SYRINGE (ML) INTRAVENOUS
Status: DISCONTINUED | OUTPATIENT
Start: 2017-12-07 | End: 2017-12-07

## 2017-12-07 RX ORDER — POLYETHYLENE GLYCOL 3350 17 G/17G
17 POWDER, FOR SOLUTION ORAL DAILY
Qty: 1530 G | Refills: 0 | Status: SHIPPED | OUTPATIENT
Start: 2017-12-07 | End: 2018-02-15

## 2017-12-07 RX ORDER — HYDROMORPHONE HYDROCHLORIDE 2 MG/ML
INJECTION, SOLUTION INTRAMUSCULAR; INTRAVENOUS; SUBCUTANEOUS
Status: DISCONTINUED | OUTPATIENT
Start: 2017-12-07 | End: 2017-12-07

## 2017-12-07 RX ORDER — LISINOPRIL AND HYDROCHLOROTHIAZIDE 10; 12.5 MG/1; MG/1
2 TABLET ORAL DAILY
Status: DISCONTINUED | OUTPATIENT
Start: 2017-12-08 | End: 2017-12-08 | Stop reason: HOSPADM

## 2017-12-07 RX ADMIN — HYDROMORPHONE HYDROCHLORIDE 0.2 MG: 2 INJECTION, SOLUTION INTRAMUSCULAR; INTRAVENOUS; SUBCUTANEOUS at 05:12

## 2017-12-07 RX ADMIN — HYDRALAZINE HYDROCHLORIDE 20 MG: 10 TABLET, FILM COATED ORAL at 10:12

## 2017-12-07 RX ADMIN — OXYCODONE HYDROCHLORIDE 15 MG: 5 TABLET ORAL at 05:12

## 2017-12-07 RX ADMIN — SODIUM CHLORIDE: 0.9 INJECTION, SOLUTION INTRAVENOUS at 04:12

## 2017-12-07 RX ADMIN — DOCUSATE SODIUM 100 MG: 100 CAPSULE, LIQUID FILLED ORAL at 09:12

## 2017-12-07 RX ADMIN — HYDROMORPHONE HYDROCHLORIDE 0.4 MG: 2 INJECTION INTRAMUSCULAR; INTRAVENOUS; SUBCUTANEOUS at 05:12

## 2017-12-07 RX ADMIN — PHENYLEPHRINE HYDROCHLORIDE 100 MCG: 10 INJECTION INTRAVENOUS at 04:12

## 2017-12-07 RX ADMIN — PROPOFOL 200 MG: 10 INJECTION, EMULSION INTRAVENOUS at 03:12

## 2017-12-07 RX ADMIN — PHENYLEPHRINE HYDROCHLORIDE 200 MCG: 10 INJECTION INTRAVENOUS at 04:12

## 2017-12-07 RX ADMIN — GLYCOPYRROLATE 0.6 MG: 0.2 INJECTION, SOLUTION INTRAMUSCULAR; INTRAVENOUS at 05:12

## 2017-12-07 RX ADMIN — ROCURONIUM BROMIDE 35 MG: 10 INJECTION, SOLUTION INTRAVENOUS at 03:12

## 2017-12-07 RX ADMIN — CEFAZOLIN 1 G: 1 INJECTION, POWDER, FOR SOLUTION INTRAVENOUS at 03:12

## 2017-12-07 RX ADMIN — ACETAMINOPHEN 1000 MG: 10 INJECTION, SOLUTION INTRAVENOUS at 05:12

## 2017-12-07 RX ADMIN — FENTANYL CITRATE 100 MCG: 50 INJECTION, SOLUTION INTRAMUSCULAR; INTRAVENOUS at 03:12

## 2017-12-07 RX ADMIN — HYDROMORPHONE HYDROCHLORIDE 0.5 MG: 2 INJECTION INTRAMUSCULAR; INTRAVENOUS; SUBCUTANEOUS at 09:12

## 2017-12-07 RX ADMIN — SUCCINYLCHOLINE CHLORIDE 120 MG: 20 INJECTION, SOLUTION INTRAMUSCULAR; INTRAVENOUS at 03:12

## 2017-12-07 RX ADMIN — CEFAZOLIN SODIUM 2 G: 2 SOLUTION INTRAVENOUS at 12:12

## 2017-12-07 RX ADMIN — SODIUM CHLORIDE: 0.9 INJECTION, SOLUTION INTRAVENOUS at 02:12

## 2017-12-07 RX ADMIN — Medication 40 MG: at 03:12

## 2017-12-07 RX ADMIN — MIDAZOLAM HYDROCHLORIDE 2 MG: 1 INJECTION, SOLUTION INTRAMUSCULAR; INTRAVENOUS at 02:12

## 2017-12-07 RX ADMIN — FENTANYL CITRATE 50 MCG: 50 INJECTION, SOLUTION INTRAMUSCULAR; INTRAVENOUS at 03:12

## 2017-12-07 RX ADMIN — HYDROMORPHONE HYDROCHLORIDE 0.6 MG: 2 INJECTION INTRAMUSCULAR; INTRAVENOUS; SUBCUTANEOUS at 05:12

## 2017-12-07 RX ADMIN — METOPROLOL SUCCINATE 50 MG: 50 TABLET, EXTENDED RELEASE ORAL at 09:12

## 2017-12-07 RX ADMIN — ROCURONIUM BROMIDE 10 MG: 10 INJECTION, SOLUTION INTRAVENOUS at 03:12

## 2017-12-07 RX ADMIN — PREGABALIN 75 MG: 75 CAPSULE ORAL at 09:12

## 2017-12-07 RX ADMIN — ATORVASTATIN CALCIUM 40 MG: 20 TABLET, FILM COATED ORAL at 09:12

## 2017-12-07 RX ADMIN — NEOSTIGMINE METHYLSULFATE 5 MG: 1 INJECTION INTRAVENOUS at 05:12

## 2017-12-07 RX ADMIN — ROCURONIUM BROMIDE 5 MG: 10 INJECTION, SOLUTION INTRAVENOUS at 03:12

## 2017-12-07 RX ADMIN — METHOCARBAMOL 1500 MG: 500 TABLET ORAL at 06:12

## 2017-12-07 RX ADMIN — LIDOCAINE HYDROCHLORIDE 100 MG: 20 INJECTION, SOLUTION INTRAVENOUS at 03:12

## 2017-12-07 RX ADMIN — SODIUM CHLORIDE: 0.9 INJECTION, SOLUTION INTRAVENOUS at 05:12

## 2017-12-07 RX ADMIN — ROCURONIUM BROMIDE 10 MG: 10 INJECTION, SOLUTION INTRAVENOUS at 04:12

## 2017-12-07 RX ADMIN — ONDANSETRON 4 MG: 2 INJECTION INTRAMUSCULAR; INTRAVENOUS at 04:12

## 2017-12-07 NOTE — DISCHARGE INSTRUCTIONS
DR. JOSHUA RAI    POSTOPERATIVE LUMBAR DISCECTOMY INSTRUCTIONS    Antibiotics: You do not need additional antibiotics at home.    Wound Care: You may remove your dressing and shower 5 days after surgery. Until then please keep your wound clean and dry. Sponge baths are acceptable. Do not go in a pool or hot tub until seen in clinic. Please leave the small steri-strips covering your wound in place until they fall off naturally (2 weeks). You may notice clear suture ends hanging from the sides of your incense after the steri-strips are removed, it is ok to clip these with scissors.    Brace: You do not need a brace.    Pain: You will be given a prescription for pain medicines before discharge. If you pain is not adequately controlled with these medications, please call [phone].    Infection: Signs of infection include increasing wound drainage and redness around the wound, as well as a temperature over 101.5 degrees. It is unnecessary to take your temperature on a routine basis. Please call the above number if you are concerned about an infection.    Driving and Work: It is ok to return to driving and work as long as you are not taking narcotic pain medications. Please do not lift over 10 pounds or participate in exercise or sports until cleared by Dr. Corey.    Deep Venous Thrombosis (Blood Clots): Symptoms include swelling in the legs and shortness of breath. Please call the office or proceed to the nearest emergency room if you have any of these symptoms.    Physical Therapy: The best physical therapy after surgery is walking. Please try to walk as much as possible.    Follow-up: You will be scheduled for a follow-up appointment in 2-3 weeks.    Questions: During business hours please call [phone] for routine questions. For after hours questions please call [phone] and ask to speak with the orthopaedic resident on call.

## 2017-12-07 NOTE — ANESTHESIA PREPROCEDURE EVALUATION
12/07/2017  Nilesh Sanchez is a 62 y.o., male     Pre-operative evaluation for Procedure(s) (LRB):  DISKECTOMY-LAMINECTOMY-LUMBAR Bilateral L4/5 Iraj Montgomery + Davy 4th case in spine room  (N/A)    Nilesh Sanchez is a 62 y.o. male     LDA:     Prev airway:     Drips:     Patient Active Problem List   Diagnosis    Benign neoplasm of colon    Allergic rhinitis    Essential hypertension    Diverticulosis of colon (without mention of hemorrhage)    Pure hyperglyceridemia    Mixed hyperlipidemia    GERD (gastroesophageal reflux disease)    Chronic anxiety    Acute suppurative otitis media of right ear without spontaneous rupture of tympanic membrane    Lumbar disc herniation with radiculopathy       Review of patient's allergies indicates:   Allergen Reactions    Flonase [fluticasone] Other (See Comments)     inflammation , sx seemed to worsen over time rather than improve        No current facility-administered medications on file prior to encounter.      Current Outpatient Prescriptions on File Prior to Encounter   Medication Sig Dispense Refill    atorvastatin (LIPITOR) 40 MG tablet Take 1 tablet (40 mg total) by mouth once daily. (Patient taking differently: Take 40 mg by mouth nightly. ) 30 tablet 11    [START ON 12/11/2017] chlorhexidine (PERIDEX) 0.12 % solution Use as directed 10 mLs in the mouth or throat 2 (two) times daily.      diclofenac (VOLTAREN) 75 MG EC tablet Take 1 tablet (75 mg total) by mouth 2 (two) times daily. With food 1 tablet 1    fexofenadine (ALLEGRA) 180 MG tablet Take 1 tablet (180 mg total) by mouth once daily. 90 tablet 1    lisinopril-hydrochlorothiazide (PRINZIDE,ZESTORETIC) 20-25 mg Tab Take 1 tablet by mouth once daily. 30 tablet 5    metoprolol succinate (TOPROL-XL) 50 MG 24 hr tablet Take 1 tablet (50 mg total) by mouth every evening. 30 tablet 5     mupirocin (BACTROBAN) 2 % ointment Apply 1 g topically 2 (two) times daily.      pantoprazole (PROTONIX) 40 MG tablet Take 1 tablet (40 mg total) by mouth once daily. 30 tablet 11    sertraline (ZOLOFT) 100 MG tablet Take 1 tablet (100 mg total) by mouth once daily. 30 tablet 5    testosterone cypionate (DEPOTESTOTERONE CYPIONATE) 200 mg/mL injection   1       Past Surgical History:   Procedure Laterality Date    COLECTOMY      18in of sigmoid colon removed    ESOPHAGOGASTRODUODENOSCOPY      UPPER GASTROINTESTINAL ENDOSCOPY         Social History     Social History    Marital status:      Spouse name: N/A    Number of children: N/A    Years of education: N/A     Occupational History    Not on file.     Social History Main Topics    Smoking status: Never Smoker    Smokeless tobacco: Never Used    Alcohol use 9.6 - 10.8 oz/week     2 Glasses of wine, 14 - 16 Standard drinks or equivalent per week    Drug use: No    Sexual activity: Yes     Partners: Female     Other Topics Concern    Not on file     Social History Narrative    Owns company making leather belts.          Vital Signs Range (Last 24H):         CBC:   Recent Labs      17   1331   WBC  10.45   RBC  5.21   HGB  16.4   HCT  51.5   PLT  310   MCV  99*   MCH  31.5*   MCHC  31.8*       CMP:   Recent Labs      17   1331   NA  140   K  4.7   CL  95   CO2  30   BUN  39*   CREATININE  1.48*   GLU  126*   CALCIUM  9.4       INR  Recent Labs      17   1331   INR  0.9   APTT  26.8           Diagnostic Studies:      EKD Echo:      .    Anesthesia Evaluation         Review of Systems      Physical Exam  General:  Well nourished    Airway/Jaw/Neck:  Airway Findings: Mouth Opening: Normal Tongue: Normal  General Airway Assessment: Adult  Mallampati: III  Improves to II with phonation.            Mental Status:  Mental Status Findings:  Cooperative, Alert and Oriented         Anesthesia Plan  Type of Anesthesia, risks &  benefits discussed:  Anesthesia Type:  general  Patient's Preference:   Intra-op Monitoring Plan: standard ASA monitors  Intra-op Monitoring Plan Comments:   Post Op Pain Control Plan:   Post Op Pain Control Plan Comments:   Induction:   IV  Beta Blocker:  Patient is on a Beta-Blocker and has received one dose within the past 24 hours (No further documentation required).       Informed Consent: Patient understands risks and agrees with Anesthesia plan.  Questions answered. Anesthesia consent signed with patient.  ASA Score: 3     Day of Surgery Review of History & Physical:    H&P update referred to the surgeon.         Ready For Surgery From Anesthesia Perspective.

## 2017-12-07 NOTE — INTERVAL H&P NOTE
The patient has been examined and the H&P has been reviewed:    I concur with the findings and no changes have occurred since H&P was written.    Anesthesia/Surgery risks, benefits and alternative options discussed and understood by patient/family.          Active Hospital Problems    Diagnosis  POA    Lumbar disc herniation with radiculopathy [M51.16]  Yes      Resolved Hospital Problems    Diagnosis Date Resolved POA   No resolved problems to display.

## 2017-12-07 NOTE — TRANSFER OF CARE
"Anesthesia Transfer of Care Note    Patient: Nilesh Sanchez    Procedure(s) Performed: Procedure(s) (LRB):  DISKECTOMY-LAMINECTOMY-LUMBAR L4/5 (Bilateral)    Patient location: PACU    Anesthesia Type: general    Transport from OR: Transported from OR on 2-3 L/min O2 by NC with adequate spontaneous ventilation    Post pain: adequate analgesia    Post assessment: no apparent anesthetic complications    Post vital signs: stable    Level of consciousness: awake, alert and oriented    Nausea/Vomiting: no nausea/vomiting    Complications: none    Transfer of care protocol was followed      Last vitals:   Visit Vitals  BP (!) 202/94   Pulse 79   Temp 36.8 °C (98.2 °F) (Oral)   Resp 17   Ht 6' 1" (1.854 m)   Wt 120.2 kg (265 lb)   SpO2 97%   BMI 34.96 kg/m²     "

## 2017-12-07 NOTE — H&P (VIEW-ONLY)
DATE: 12/7/2017  PATIENT: Nilesh Sanchez    Supervising Physician: Bruce Corey M.D.    CHIEF COMPLAINT: back and bilateral leg pain    HISTORY:  Nilesh Sanchez is a 62 y.o. male here for initial evaluation of low back and bilateral, L>R leg pain (Back - 4, Leg - 10).  The pain in the legs is what bothers him most.  The pain has been present for 2 weeks. The patient describes the pain as sharp and numbness/tingling.  The pain is worse with standing and walking and improved by laying down. There is associated numbness and tingling which started in the left leg but is progressively worsening and now moving to the right leg as well. There is subjective weakness in the left lower extremity.  He has started dragging his left leg when he walks.  He is scheduled for a L4/5 microdiskectomy 12/13 but is here for a second opinion.  Prior treatments have included a medrol dose pack, NSAIDs and pain medications, but no surgery.  No prior history of back injury, ESIs or surgery.     The patient denies myelopathic symptoms such as handwriting changes or difficulty with buttons/coins/keys. Denies perineal paresthesias, bowel/bladder dysfunction.    PAST MEDICAL/SURGICAL HISTORY:  Past Medical History:   Diagnosis Date    Allergic rhinosinusitis     Anxiety     Back pain     with spasms    Diverticulitis     s/p partial colectomy    GERD (gastroesophageal reflux disease)     Hyperlipidemia     Hypertension      Past Surgical History:   Procedure Laterality Date    COLECTOMY      18in of sigmoid colon removed    ESOPHAGOGASTRODUODENOSCOPY      UPPER GASTROINTESTINAL ENDOSCOPY         Medications:   Current Outpatient Prescriptions on File Prior to Visit   Medication Sig Dispense Refill    atorvastatin (LIPITOR) 40 MG tablet Take 1 tablet (40 mg total) by mouth once daily. (Patient taking differently: Take 40 mg by mouth nightly. ) 30 tablet 11    [START ON 12/11/2017] chlorhexidine (PERIDEX) 0.12 % solution Use as  directed 10 mLs in the mouth or throat 2 (two) times daily.      diclofenac (VOLTAREN) 75 MG EC tablet Take 1 tablet (75 mg total) by mouth 2 (two) times daily. With food 1 tablet 1    fexofenadine (ALLEGRA) 180 MG tablet Take 1 tablet (180 mg total) by mouth once daily. 90 tablet 1    lisinopril-hydrochlorothiazide (PRINZIDE,ZESTORETIC) 20-25 mg Tab Take 1 tablet by mouth once daily. 30 tablet 5    metoprolol succinate (TOPROL-XL) 50 MG 24 hr tablet Take 1 tablet (50 mg total) by mouth every evening. 30 tablet 5    mupirocin (BACTROBAN) 2 % ointment Apply 1 g topically 2 (two) times daily.      pantoprazole (PROTONIX) 40 MG tablet Take 1 tablet (40 mg total) by mouth once daily. 30 tablet 11    sertraline (ZOLOFT) 100 MG tablet Take 1 tablet (100 mg total) by mouth once daily. 30 tablet 5    testosterone cypionate (DEPOTESTOTERONE CYPIONATE) 200 mg/mL injection   1     No current facility-administered medications on file prior to visit.        Social History:   Social History     Social History    Marital status:      Spouse name: N/A    Number of children: N/A    Years of education: N/A     Occupational History    Not on file.     Social History Main Topics    Smoking status: Never Smoker    Smokeless tobacco: Never Used    Alcohol use 9.6 - 10.8 oz/week     2 Glasses of wine, 14 - 16 Standard drinks or equivalent per week    Drug use: No    Sexual activity: Yes     Partners: Female     Other Topics Concern    Not on file     Social History Narrative    Owns company making leather belts.        REVIEW OF SYSTEMS:  Constitution: Negative. Negative for chills, fever and night sweats.   Cardiovascular: Negative for chest pain and syncope.   Respiratory: Negative for cough and shortness of breath.   Gastrointestinal: See HPI. Negative for nausea/vomiting. Negative for abdominal pain.  Genitourinary: See HPI. Negative for discoloration or dysuria.  Skin: Negative for dry skin, itching and rash.    Hematologic/Lymphatic: Negative for bleeding problem. Does not bruise/bleed easily.   Musculoskeletal: Negative for falls and muscle weakness.   Neurological: See HPI. No seizures.   Endocrine: Negative for polydipsia, polyphagia and polyuria.   Allergic/Immunologic: Negative for hives and persistent infections.     EXAM:  There were no vitals taken for this visit.    General: The patient is a very pleasant 62 y.o. male in no apparent distress, the patient is oriented to person, place and time.  Psych: Normal mood and affect  HEENT: Vision grossly intact, hearing intact to the spoken word.  Lungs: Respirations unlabored.  Gait: Antalgic station and gait, no difficulty with toe walk.  Unable to perform heel walk.   Skin: Dorsal lumbar skin negative for rashes, lesions, hairy patches and surgical scars. There is mild lumbar tenderness to palpation.  Range of motion: Lumbar range of motion is acceptable.  Spinal Balance: Global saggital and coronal spinal balance acceptable, not significant for scoliosis and kyphosis.  Musculoskeletal: No pain with the range of motion of the bilateral hips. No trochanteric tenderness to palpation.  Vascular: Bilateral lower extremities warm and well perfused, dorsalis pedis pulses 2+ bilaterally.  Neurological: Normal strength and tone in all major motor groups in the bilateral lower extremities with the exception of 1/5 strength with ankle dorsiflexion and great toe extension on the left. Normal sensation to light touch in the L2-S1 dermatomes bilaterally with the exception of decreased sensation in the L4-S1 dermatomes bilaterally, left worse than right.  Deep tendon reflexes symmetric 1+ in the bilateral lower extremities.  Negative Babinski bilaterally. Straight leg raise negative bilaterally.    IMAGING:      Today I personally reviewed AP and Lat upright L-spine films that demonstrate mild degenerative changes.    MRI lumbar spine demonstrates a large bulging disc at  L4/5.    ASSESSMENT/PLAN:    There are no diagnoses linked to this encounter.    Patient seen and examined with Dr. Corey.    I had a sit down discussion with the patient and his wife regarding a L4/5 diskectomy. We specifically discussed the risks, benefits, and alternatives to surgery. We discussed the surgical procedure including the skin incision, discectomy and nerve decompression: they understand the risks include but are not limited to bleeding, infection, damage to arteries, veins and nerves, re-herniation, death, paralysis, blindness, spinal fluid leak, continued or worsening pain, the possible need for more surgery in the future as well as the possibility other unforseen and unknown complications. We talked about expected hospital stay and recovery period. All questions were answered; they understand and wish to proceed.     Plan for L4/5 diskectomy tomorrow (12/7).

## 2017-12-07 NOTE — BRIEF OP NOTE
[facility]  Brief Operative Note    SUMMARY     Surgery Date: 12/7/2017     Surgeon(s) and Role:     * Raymond Sánchez MD - Resident - Assisting     * Bruce Corey MD - Primary        Pre-op Diagnosis:  Lumbar disc herniation with radiculopathy [M51.16]    Post-op Diagnosis:  Post-Op Diagnosis Codes:     * Lumbar disc herniation with radiculopathy [M51.16]    Procedure(s) (LRB):  DISKECTOMY-LAMINECTOMY-LUMBAR L4/5 (Bilateral)    Anesthesia: General    Description of Procedure: see op note    Estimated Blood Loss: * No values recorded between 12/7/2017  3:42 PM and 12/7/2017  5:23 PM *         Specimens:   Specimen (12h ago through future)    None

## 2017-12-08 VITALS
SYSTOLIC BLOOD PRESSURE: 164 MMHG | WEIGHT: 265 LBS | OXYGEN SATURATION: 95 % | HEART RATE: 72 BPM | BODY MASS INDEX: 35.12 KG/M2 | HEIGHT: 73 IN | TEMPERATURE: 97 F | DIASTOLIC BLOOD PRESSURE: 72 MMHG | RESPIRATION RATE: 18 BRPM

## 2017-12-08 LAB
ANION GAP SERPL CALC-SCNC: 8 MMOL/L
BASOPHILS # BLD AUTO: 0.05 K/UL
BASOPHILS NFR BLD: 0.4 %
BUN SERPL-MCNC: 21 MG/DL
CALCIUM SERPL-MCNC: 8 MG/DL
CHLORIDE SERPL-SCNC: 105 MMOL/L
CO2 SERPL-SCNC: 25 MMOL/L
CREAT SERPL-MCNC: 0.9 MG/DL
DIFFERENTIAL METHOD: ABNORMAL
EOSINOPHIL # BLD AUTO: 0.1 K/UL
EOSINOPHIL NFR BLD: 0.7 %
ERYTHROCYTE [DISTWIDTH] IN BLOOD BY AUTOMATED COUNT: 12.9 %
EST. GFR  (AFRICAN AMERICAN): >60 ML/MIN/1.73 M^2
EST. GFR  (NON AFRICAN AMERICAN): >60 ML/MIN/1.73 M^2
GLUCOSE SERPL-MCNC: 93 MG/DL
HCT VFR BLD AUTO: 40.4 %
HGB BLD-MCNC: 13.1 G/DL
IMM GRANULOCYTES # BLD AUTO: 0.04 K/UL
IMM GRANULOCYTES NFR BLD AUTO: 0.3 %
LYMPHOCYTES # BLD AUTO: 2.4 K/UL
LYMPHOCYTES NFR BLD: 20.8 %
MCH RBC QN AUTO: 31 PG
MCHC RBC AUTO-ENTMCNC: 32.4 G/DL
MCV RBC AUTO: 96 FL
MONOCYTES # BLD AUTO: 1.1 K/UL
MONOCYTES NFR BLD: 9.9 %
NEUTROPHILS # BLD AUTO: 7.8 K/UL
NEUTROPHILS NFR BLD: 67.9 %
NRBC BLD-RTO: 0 /100 WBC
PLATELET # BLD AUTO: 227 K/UL
PMV BLD AUTO: 9.5 FL
POTASSIUM SERPL-SCNC: 4.2 MMOL/L
RBC # BLD AUTO: 4.22 M/UL
SODIUM SERPL-SCNC: 138 MMOL/L
WBC # BLD AUTO: 11.47 K/UL

## 2017-12-08 PROCEDURE — 25000003 PHARM REV CODE 250: Performed by: STUDENT IN AN ORGANIZED HEALTH CARE EDUCATION/TRAINING PROGRAM

## 2017-12-08 PROCEDURE — 97161 PT EVAL LOW COMPLEX 20 MIN: CPT

## 2017-12-08 PROCEDURE — 85025 COMPLETE CBC W/AUTO DIFF WBC: CPT

## 2017-12-08 PROCEDURE — 63600175 PHARM REV CODE 636 W HCPCS: Performed by: STUDENT IN AN ORGANIZED HEALTH CARE EDUCATION/TRAINING PROGRAM

## 2017-12-08 PROCEDURE — 80048 BASIC METABOLIC PNL TOTAL CA: CPT

## 2017-12-08 PROCEDURE — 97116 GAIT TRAINING THERAPY: CPT

## 2017-12-08 PROCEDURE — 97530 THERAPEUTIC ACTIVITIES: CPT

## 2017-12-08 PROCEDURE — 36415 COLL VENOUS BLD VENIPUNCTURE: CPT

## 2017-12-08 PROCEDURE — 97165 OT EVAL LOW COMPLEX 30 MIN: CPT

## 2017-12-08 RX ADMIN — LISINOPRIL AND HYDROCHLOROTHIAZIDE 2 TABLET: 12.5; 1 TABLET ORAL at 09:12

## 2017-12-08 RX ADMIN — HYDROMORPHONE HYDROCHLORIDE 0.5 MG: 2 INJECTION INTRAMUSCULAR; INTRAVENOUS; SUBCUTANEOUS at 12:12

## 2017-12-08 RX ADMIN — METHOCARBAMOL 1500 MG: 500 TABLET ORAL at 12:12

## 2017-12-08 RX ADMIN — HYDROMORPHONE HYDROCHLORIDE 0.5 MG: 2 INJECTION INTRAMUSCULAR; INTRAVENOUS; SUBCUTANEOUS at 07:12

## 2017-12-08 RX ADMIN — METHOCARBAMOL 1500 MG: 500 TABLET ORAL at 05:12

## 2017-12-08 RX ADMIN — SERTRALINE HYDROCHLORIDE 100 MG: 50 TABLET ORAL at 09:12

## 2017-12-08 RX ADMIN — ACETAMINOPHEN 1000 MG: 10 INJECTION, SOLUTION INTRAVENOUS at 05:12

## 2017-12-08 RX ADMIN — CEFAZOLIN SODIUM 2 G: 2 SOLUTION INTRAVENOUS at 12:12

## 2017-12-08 RX ADMIN — PREGABALIN 75 MG: 75 CAPSULE ORAL at 09:12

## 2017-12-08 RX ADMIN — OXYCODONE HYDROCHLORIDE 10 MG: 5 TABLET ORAL at 05:12

## 2017-12-08 RX ADMIN — PANTOPRAZOLE SODIUM 40 MG: 40 TABLET, DELAYED RELEASE ORAL at 09:12

## 2017-12-08 RX ADMIN — ACETAMINOPHEN 1000 MG: 10 INJECTION, SOLUTION INTRAVENOUS at 12:12

## 2017-12-08 RX ADMIN — POLYETHYLENE GLYCOL 3350 17 G: 17 POWDER, FOR SOLUTION ORAL at 09:12

## 2017-12-08 RX ADMIN — DOCUSATE SODIUM 100 MG: 100 CAPSULE, LIQUID FILLED ORAL at 05:12

## 2017-12-08 NOTE — PLAN OF CARE
CM met with patient and patient's spouse to discuss DME. Patient requested a rolling walker and a bedside commode.    CM ordered a rolling walker and a bedside commode from [facility] with [facility]. DME for bedside delivery.    Lyly Coker RN, CM [facility]  [phone] -[phone]

## 2017-12-08 NOTE — PT/OT/SLP EVAL
Physical Therapy Evaluation and Discharge Note    Patient Name:  Nilesh Sanchez   MRN:  [id number]    Recommendations:     Discharge Recommendations:  home health PT   Discharge Equipment Recommendations: bedside commode, walker, rolling   Barriers to discharge: None    Assessment:     Nilesh Sanchez is a 62 y.o. male admitted with a medical diagnosis of Lumbar disc herniation with radiculopathy. .  At this time, patient is functioning at their prior level of function and does not require further acute PT services.     Recent Surgery: Procedure(s) (LRB):  DISKECTOMY-LAMINECTOMY-LUMBAR L4/5 (Bilateral) 1 Day Post-Op    Plan:     During this hospitalization, patient does not require further acute PT services.  Please re-consult if situation changes.     Plan of Care Reviewed with: patient, spouse    Subjective     Communicated with nursing prior to session.  Patient found supine in bed upon PT entry to room, agreeable to evaluation.      Chief Complaint: back pain  Patient comments/goals: to go home  Pain/Comfort:  · Pain Rating 1: 3/10  · Location - Orientation 1: generalized  · Location 1: back  · Pain Addressed 1: Pre-medicate for activity, Reposition, Cessation of Activity  · Pain Rating Post-Intervention 1: 3/10    Patients cultural, spiritual, Jainism conflicts given the current situation:      Living Environment:  Pt. Lives with spouse and has upstairs bedroom(13 stairs).  Prior to admission, patients level of function was indep.  Patient has the following equipment: none.  DME owned (not currently used): none.  Upon discharge, patient will have assistance from spouse.    Objective:     Patient found with: peripheral IV     General Precautions: Standard, fall   Orthopedic Precautions:N/A   Braces:       Exams:  · Cognitive Exam:  Patient is oriented to Person, Place, Time and Situation and follows 100% of all commands   · RUE ROM: WFL  · RUE Strength: WFL  · LUE ROM: WFL  · LUE Strength: WFL  · RLE ROM:  WFL  · RLE Strength: WFL   · LLE ROM: WFL  · LLE Strength: WFL except decreased active DF of (L) foot/ankle    Functional Mobility:  · Bed Mobility:     · Rolling Right: contact guard assistance  · Scooting: contact guard assistance  · Supine to Sit: minimum assistance  · Transfers:     · Sit to Stand:  contact guard assistance with no AD and rolling walker  · Gait: 100' without AD with HHA and CGA. 150' with RW and SBA with seated rest break between trials  · Balance: fair  · Stairs:  Pt ascended/descended 18 stair(s) with No Assistive Device with bilateral handrails with Contact Guard Assistance.     AM-PAC 6 CLICK MOBILITY  Total Score:18       Therapeutic Activities and Exercises:   Discussed use of AFO, safety on stairs, use of RW, bed mobility/transfer techniques, and PT POC.    Patient left up in chair with all lines intact and call button in reach.    GOALS:    Physical Therapy Goals     Not on file          Multidisciplinary Problems (Resolved)        Problem: Physical Therapy Goal    Goal Priority Disciplines Outcome Goal Variances Interventions   Physical Therapy Goal   (Resolved)     PT/OT, PT Outcome(s) achieved                     History:     Past Medical History:   Diagnosis Date    Allergic rhinosinusitis     Anxiety     Back pain     with spasms    Diverticulitis     s/p partial colectomy    GERD (gastroesophageal reflux disease)     Hyperlipidemia     Hypertension        Past Surgical History:   Procedure Laterality Date    COLECTOMY      18in of sigmoid colon removed    ESOPHAGOGASTRODUODENOSCOPY      UPPER GASTROINTESTINAL ENDOSCOPY         Clinical Decision Making:     History  Co-morbidities and personal factors that may impact the plan of care Examination  Body Structures and Functions, activity limitations and participation restrictions that may impact the plan of care Clinical Presentation   Decision Making/ Complexity Score   Co-morbidities:   [] Time since onset of injury /  illness / exacerbation  [] Status of current condition  []Patient's cognitive status and safety concerns    [] Multiple Medical Problems (see med hx)  Personal Factors:   [] Patient's age  [] Prior Level of function   [] Patient's home situation (environment and family support)  [] Patient's level of motivation  [] Expected progression of patient      HISTORY:(criteria)    [x] 20818 - no personal factors/history    [] 67359 - has 1-2 personal factor/comorbidity     [] 81592 - has >3 personal factor/comorbidity     Body Regions:  [] Objective examination findings  [] Head     []  Neck  [] Trunk   [] Upper Extremity  [] Lower Extremity    Body Systems:  [] For communication ability, affect, cognition, language, and learning style: the assessment of the ability to make needs known, consciousness, orientation (person, place, and time), expected emotional /behavioral responses, and learning preferences (eg, learning barriers, education  needs)  [x] For the neuromuscular system: a general assessment of gross coordinated movement (eg, balance, gait, locomotion, transfers, and transitions) and motor function  (motor control and motor learning)  [x] For the musculoskeletal system: the assessment of gross symmetry, gross range of motion, gross strength, height, and weight  [] For the integumentary system: the assessment of pliability(texture), presence of scar formation, skin color, and skin integrity  [] For cardiovascular/pulmonary system: the assessment of heart rate, respiratory rate, blood pressure, and edema     Activity limitations:    [] Patient's cognitive status and saf ety concerns          [] Status of current condition      [] Weight bearing restriction  [] Cardiopulmunary Restriction    Participation Restrictions:   [] Goals and goal agreement with the patient     [] Rehab potential (prognosis) and probable outcome      Examination of Body System: (criteria)    [x] 94077 - addressing 1-2 elements    [] 52036 -  addressing a total of 3 or more elements     [] 46135 -  Addressing a total of 4 or more elements         Clinical Presentation: (criteria)  Stable - 65847     On examination of body system using standardized tests and measures patient presents with 1-2 elements from any of the following: body structures and functions, activity limitations, and/or participation restrictions.  Leading to a clinical presentation that is considered stable and/or uncomplicated                              Clinical Decision Making  (Eval Complexity):  Low- 83558     Time Tracking:     PT Received On: 12/08/17  PT Start Time: 1042     PT Stop Time: 1112  PT Total Time (min): 30 min     Billable Minutes: Evaluation 15 and Gait Training 15      Raymond Perez, PT  12/08/2017

## 2017-12-08 NOTE — NURSING TRANSFER
Nursing Transfer Note      12/7/2017     Transfer To: 654    Transfer via stretcher    Transfer with 02 2L nasal canula    Transported by pct    Medicines sent: no    Chart send with patient: Yes    Notified: spouse

## 2017-12-08 NOTE — PLAN OF CARE
Problem: Physical Therapy Goal  Goal: Physical Therapy Goal  Outcome: Outcome(s) achieved Date Met: 12/08/17  No goals set

## 2017-12-08 NOTE — SUBJECTIVE & OBJECTIVE
"Principal Problem:Lumbar disc herniation with radiculopathy    Principal Orthopedic Problem: same    Interval History: Patient seen and examined at bedside.  No acute events overnight.  Pain controlled.  Preop radiculopathy resolved.  No PT yesterday.      Review of patient's allergies indicates:   Allergen Reactions    Flonase [fluticasone] Other (See Comments)     inflammation , sx seemed to worsen over time rather than improve       Current Facility-Administered Medications   Medication    0.9%  NaCl infusion    0.9%  NaCl infusion    acetaminophen (10 mg/mL) injection 1,000 mg    acetaminophen tablet 1,000 mg    atorvastatin tablet 40 mg    bisacodyl suppository 10 mg    diphenhydrAMINE injection 25 mg    docusate sodium capsule 100 mg    hydromorphone (PF) injection 0.5 mg    lisinopril-hydrochlorothiazide 10-12.5 mg per tablet 2 tablet    methocarbamol tablet 1,500 mg    metoprolol succinate (TOPROL-XL) 24 hr tablet 50 mg    mupirocin 2 % ointment    ondansetron injection 4 mg    oxyCODONE immediate release tablet 10 mg    oxyCODONE immediate release tablet 15 mg    oxyCODONE immediate release tablet 5 mg    pantoprazole EC tablet 40 mg    polyethylene glycol packet 17 g    pregabalin capsule 75 mg    promethazine tablet 25 mg    ramelteon tablet 8 mg    sertraline tablet 100 mg    sodium chloride 0.9% flush 3 mL    sodium chloride 0.9% flush 3 mL     Objective:     Vital Signs (Most Recent):  Temp: 98 °F (36.7 °C) (12/08/17 0520)  Pulse: 71 (12/08/17 0520)  Resp: 18 (12/08/17 0520)  BP: (!) 118/58 (12/08/17 0520)  SpO2: (!) 92 % (12/08/17 0520) Vital Signs (24h Range):  Temp:  [97.4 °F (36.3 °C)-98.6 °F (37 °C)] 98 °F (36.7 °C)  Pulse:  [66-87] 71  Resp:  [11-20] 18  SpO2:  [88 %-100 %] 92 %  BP: (118-202)/(58-94) 118/58     Weight: 120.2 kg (265 lb)  Height: 6' 1" (185.4 cm)  Body mass index is 34.96 kg/m².      Intake/Output Summary (Last 24 hours) at 12/08/17 0549  Last data filed " at 12/08/17 0049   Gross per 24 hour   Intake          3156.25 ml   Output              500 ml   Net          2656.25 ml       Ortho/SPM Exam  AAOx4  NAD  RRR  No increased WOB  Dressing c/d/i  Improved strength L foot dorsiflexion, SILT  WWP extremities  SCDs in place and functioning    Significant Labs: All pertinent labs within the past 24 hours have been reviewed.    Significant Imaging: I have reviewed all pertinent imaging results/findings.

## 2017-12-08 NOTE — PLAN OF CARE
[facility]    HOME HEALTH ORDERS  FACE TO FACE ENCOUNTER    Patient Name: Nilesh Sanchez  YOB: 1955    PCP: Flavia Lange MD   PCP Address: [address] [facility] [address]  PCP Phone Number: [phone]  PCP Fax: [phone]    Encounter Date: 12/08/2017    Admit to Home Health    Diagnoses:  Active Hospital Problems    Diagnosis  POA    *Lumbar disc herniation with radiculopathy [M51.16]  Yes    Mixed hyperlipidemia [E78.2]  Yes    GERD (gastroesophageal reflux disease) [K21.9]  Yes    Chronic anxiety [F41.9]  Yes    Essential hypertension [I10]  Yes      Resolved Hospital Problems    Diagnosis Date Resolved POA   No resolved problems to display.       Future Appointments  Date Time Provider Department Center   12/27/2017 3:00 PM Bruce Corey MD [facility] SP[facility]   2/15/2018 3:40 PM Flavia Lange MD [facility]     Follow-up Information     Katelyn Palacios PA-C In 3 weeks.    Specialty:  Orthopedic Surgery  Contact information:  [address]  [facility] [address]  [phone]                     I have seen and examined this patient face to face today. My clinical findings that support the need for the home health skilled services and home bound status are the following:  Weakness/numbness causing balance and gait disturbance due to Surgery making it taxing to leave home.    Allergies:  Review of patient's allergies indicates:   Allergen Reactions    Flonase [fluticasone] Other (See Comments)     inflammation , sx seemed to worsen over time rather than improve       Diet: regular diet    Activities: activity as tolerated    Nursing:   SN to complete comprehensive assessment including routine vital signs. Instruct on disease process and s/s of complications to report to MD. Review/verify medication list sent home with the patient at time of discharge  and instruct patient/caregiver as needed. Frequency may be adjusted  depending on start of care date.    Notify MD if SBP > 160 or < 90; DBP > 90 or < 50; HR > 120 or < 50; Temp > 101; Other:         CONSULTS:    Physical Therapy to evaluate and treat. Evaluate for home safety and equipment needs; Establish/upgrade home exercise program. Perform / instruct on therapeutic exercises, gait training, transfer training, and Range of Motion.  Occupational Therapy to evaluate and treat. Evaluate home environment for safety and equipment needs. Perform/Instruct on transfers, ADL training, ROM, and therapeutic exercises.  Aide to provide assistance with personal care, ADLs, and vital signs.    MISCELLANEOUS CARE:  N/A    WOUND CARE ORDERS  n/a      Medications: Review discharge medications with patient and family and provide education.      Current Discharge Medication List      START taking these medications    Details   docusate sodium (COLACE) 100 MG capsule Take 1 capsule (100 mg total) by mouth 3 (three) times daily.  Qty: 90 capsule, Refills: 0      methocarbamol (ROBAXIN) 750 MG Tab Take 1 tablet (750 mg total) by mouth 4 (four) times daily.  Qty: 37 tablet, Refills: 0      mupirocin calcium 2% nasl oint (BACTROBAN) 2 % Oint by Nasal route 2 (two) times daily.  Qty: 1 g, Refills: 0      ondansetron (ZOFRAN-ODT) 8 MG TbDL Take 1 tablet (8 mg total) by mouth every 8 (eight) hours as needed.  Qty: 42 tablet, Refills: 0      oxyCODONE-acetaminophen (PERCOCET)  mg per tablet Take 1 tablet by mouth every 4 (four) hours as needed.  Qty: 97 tablet, Refills: 0      polyethylene glycol (GLYCOLAX) 17 gram/dose powder Take 17 g by mouth once daily.  Qty: 1530 g, Refills: 0         CONTINUE these medications which have NOT CHANGED    Details   atorvastatin (LIPITOR) 40 MG tablet Take 1 tablet (40 mg total) by mouth once daily.  Qty: 30 tablet, Refills: 11    Associated Diagnoses: Mixed hyperlipidemia      diclofenac (VOLTAREN) 75 MG EC tablet Take 1 tablet (75 mg total) by mouth 2 (two) times  daily. With food  Qty: 1 tablet, Refills: 1      fexofenadine (ALLEGRA) 180 MG tablet Take 1 tablet (180 mg total) by mouth once daily.  Qty: 90 tablet, Refills: 1      lisinopril-hydrochlorothiazide (PRINZIDE,ZESTORETIC) 20-25 mg Tab Take 1 tablet by mouth once daily.  Qty: 30 tablet, Refills: 5    Comments: **Patient requests 90 days supply**  Associated Diagnoses: Essential hypertension      metoprolol succinate (TOPROL-XL) 50 MG 24 hr tablet Take 1 tablet (50 mg total) by mouth every evening.  Qty: 30 tablet, Refills: 5    Associated Diagnoses: Essential hypertension      mupirocin (BACTROBAN) 2 % ointment Apply 1 g topically 2 (two) times daily.      pantoprazole (PROTONIX) 40 MG tablet Take 1 tablet (40 mg total) by mouth once daily.  Qty: 30 tablet, Refills: 11    Associated Diagnoses: Gastroesophageal reflux disease, esophagitis presence not specified      sertraline (ZOLOFT) 100 MG tablet Take 1 tablet (100 mg total) by mouth once daily.  Qty: 30 tablet, Refills: 5    Comments: **Patient requests 90 days supply**  Associated Diagnoses: Chronic anxiety         STOP taking these medications       chlorhexidine (PERIDEX) 0.12 % solution Comments:   Reason for Stopping:         testosterone cypionate (DEPOTESTOTERONE CYPIONATE) 200 mg/mL injection Comments:   Reason for Stopping:               I certify that this patient is confined to his home and needs intermittent skilled nursing care, physical therapy and occupational therapy.

## 2017-12-08 NOTE — PLAN OF CARE
POD 1 s/p L4/5 lumbar diskectomy/laminectomy. PT/OT ordered to eval and treat. PT/OT recs pending. Patient currently lives with his spouse. Patient's spouse is present at the bedside. Patient has good family support. CM completed discharge assessment and planning with patient. Patient and family verbalized understanding. All questions and concerns addressed. SW and CM will continue to follow for any additional needs. Plan A to discharge home with home health as soon as medically stable. Plan B to discharge home with family support and plans for outpatient rehab.    Patient stated no  preference.    PCP: Flavia Lange MD    Pharmacy:   [facility] - [address]  [address] [facility] [address]  Phone: [phone] Fax: [phone]    [facility] [address] [place] [address] [address] [address]  Phone: [phone] Fax: [phone]    Payor: GENERIC COMMERCIAL / Plan: GENERIC COMMERCIAL / Product Type: Commercial /      12/08/17 0805   Discharge Assessment   Assessment Type Discharge Planning Assessment   Confirmed/corrected address and phone number on facesheet? Yes   Assessment information obtained from? Patient;Caregiver;Medical Record   Expected Length of Stay (days) 2   Communicated expected length of stay with patient/caregiver yes   Prior to hospitilization cognitive status: Alert/Oriented   Prior to hospitalization functional status: Independent   Current cognitive status: Alert/Oriented   Current Functional Status: Assistive Equipment   Lives With spouse   Able to Return to Prior Arrangements yes   Is patient able to care for self after discharge? Yes   Who are your caregiver(s) and their phone number(s)? spouse- Marybeth Laura [phone], [phone]   Patient's perception of discharge disposition home health   Readmission Within The Last 30 Days no previous admission in  last 30 days   Patient currently being followed by outpatient case management? No   Patient currently receives any other outside agency services? No   Equipment Currently Used at Home none   Do you have any problems affording any of your prescribed medications? No   Is the patient taking medications as prescribed? yes   Does the patient have transportation home? Yes   Transportation Available family or friend will provide   Does the patient receive services at the [facility]? No   Discharge Plan A Home Health;Home with family   Discharge Plan B Home with family;Other  (outpatient rehab)   Patient/Family In Agreement With Plan yes

## 2017-12-08 NOTE — PLAN OF CARE
Problem: Occupational Therapy Goal  Goal: Occupational Therapy Goal  Goals to be met by: 7 days    Patient will increase functional independence with ADLs by performing:    UE Dressing with Supervision.  LE Dressing with Supervision with AD as needed.  Grooming while standing with Supervision.  Toileting from bedside commode with Supervision for hygiene and clothing management.   Stand pivot transfers with Supervision.  Toilet transfer to bedside commode with Supervision.       MICHELLE Schuster  12/8/2017

## 2017-12-08 NOTE — ASSESSMENT & PLAN NOTE
62 y.o. male POD1 s/p L4/5 discectomy    Pain control  PT/OT: WBAT   DVT PPx: SCDs at all times when not ambulating  Abx: postop Ancef  Labs: pending  Drain: none  [place]: none    Dispo: home today

## 2017-12-08 NOTE — PLAN OF CARE
POD 1 s/p L4/5 lumbar diskectomy/laminectomy. PT/OT recs pending. Plans to discharge patient home today with HH. DME ordered for bedside delivery. Patient's spouse is present at the bedside. Patient and family verbalized understanding of today's discharge plan. All questions and concerns addressed. SW and CM will continue to follow for any additional needs. Discharge and follow-up instructions to be completed by the bedside nurse.     Future Appointments  Date Time Provider Department Center   12/27/2017 3:00 PM Bruce Corey MD [facility]   2/15/2018 3:40 PM Flavia Lange MD [facility] MED [facility]      12/08/17 1046   Final Note   Assessment Type Final Discharge Note   Discharge Disposition Home-Health   What phone number can be called within the next 1-3 days to see how you are doing after discharge? ([phone])   Hospital Follow Up  Appt(s) scheduled? Yes   Discharge plans and expectations educations in teach back method with documentation complete? Yes

## 2017-12-08 NOTE — ANESTHESIA POSTPROCEDURE EVALUATION
"Anesthesia Post Evaluation    Patient: Nilesh Sanchez    Procedure(s) Performed: Procedure(s) (LRB):  DISKECTOMY-LAMINECTOMY-LUMBAR L4/5 (Bilateral)    Final Anesthesia Type: general  Patient location during evaluation: med/surg floor  Patient participation: Yes- Able to Participate  Level of consciousness: awake and alert  Post-procedure vital signs: reviewed and stable  Pain management: adequate  Airway patency: patent  PONV status at discharge: No PONV  Anesthetic complications: no      Cardiovascular status: blood pressure returned to baseline  Respiratory status: unassisted, spontaneous ventilation and room air  Hydration status: euvolemic          Visit Vitals  BP (!) 164/72 (Patient Position: Lying)   Pulse 72   Temp 36.2 °C (97.2 °F) (Oral)   Resp 18   Ht 6' 1" (1.854 m)   Wt 120.2 kg (265 lb)   SpO2 95%   BMI 34.96 kg/m²       Pain/Justina Score: Pain Assessment Performed: Yes (12/8/2017  8:00 AM)  Presence of Pain: complains of pain/discomfort (12/8/2017  8:00 AM)  Pain Rating Prior to Med Admin: 10 (12/8/2017  8:00 AM)  Pain Rating Post Med Admin: 3 (12/8/2017  8:00 AM)  Justina Score: 9 (12/7/2017  8:20 PM)      "

## 2017-12-08 NOTE — PT/OT/SLP EVAL
Occupational Therapy   Evaluation    Name: Nilesh Sanchez  MRN: [id number]  Admitting Diagnosis:  Lumbar disc herniation with radiculopathy 1 Day Post-Op    Recommendations:     Discharge Recommendations: home with home health     Discharge Equipment Recommendations:  bedside commode, walker, rolling     Barriers to discharge:  Inaccessible home environment, Decreased caregiver support    History:     Occupational Profile:  Pt lives with spouse in 2  with 14 steps to bed/bath. (I) PTA  Equipment Owned:  none    Past Medical History:   Diagnosis Date    Allergic rhinosinusitis     Anxiety     Back pain     with spasms    Diverticulitis     s/p partial colectomy    GERD (gastroesophageal reflux disease)     Hyperlipidemia     Hypertension        Past Surgical History:   Procedure Laterality Date    COLECTOMY      18in of sigmoid colon removed    ESOPHAGOGASTRODUODENOSCOPY      UPPER GASTROINTESTINAL ENDOSCOPY         Subjective     Pt agreeable to evaluation    Communicated with: RN prior to session.    Pain/Comfort:  · Pain Rating 1: 3/10  · Location 1: back  · Pain Addressed 1: Pre-medicate for activity, Reposition, Distraction, Nurse notified    Objective:     Patient found with: peripheral IV    General Precautions: Standard, fall   Orthopedic Precautions:spinal precautions     Physical Exam:  · BUE strength WFL    Occupational Performance:    Bed Mobility:    · Patient completed Supine to Sit with minimum assistance    Functional Mobility/Transfers:  · Sit to Stand Transfer with minimum assistance with no AD x1 trial from EOB and with RW from bedside chair x1 trial  · Pt ambulated with Merly with no  feet and with SBA with  feet  · Pt ascended/descended 2 flights of steps with CGA    Activities of Daily Living:  · UB Dressing: stand by assistance    · LB Dressing: maximal assistance       Treatment & Education:  · Logrolling with bed mobility  · Spinal precautions  · Adaptations with ADLs  with spinal precautions  · Importance of OOB activity to improve functional outcomes following sx    Patient left up in chair with all lines intact, call button in reach and RN notified    [facility] 6 Click:  [facility] Total Score: 16  Education:    Assessment:     Nilesh Sanchez is a 62 y.o. male with a medical diagnosis of Lumbar disc herniation with radiculopathy.  He presents with increased pain, decreased function of B LEs, and spinal precautions impeding his ability to perform ADLs and functional mobility and would benefit from OT services to maximize functional (I) and safety.      Performance deficits affecting function are weakness, impaired endurance, impaired self care skills, impaired functional mobilty, gait instability, impaired balance, decreased lower extremity function, pain, decreased safety awareness, decreased ROM, orthopedic precautions.      Rehab Prognosis:  Good; patient would benefit from acute skilled OT services to address these deficits and reach maximum level of function.       Plan:     Patient to be seen 5 x/week to address the above listed problems via self-care/home management, therapeutic activities, therapeutic exercises     Plan of Care Reviewed with: patient, spouse    This Plan of care has been discussed with the patient who was involved in its development and understands and is in agreement with the identified goals and treatment plan    GOALS:    Occupational Therapy Goals        Problem: Occupational Therapy Goal    Goal Priority Disciplines Outcome Interventions   Occupational Therapy Goal     OT, PT/OT     Description:  Goals to be met by: 7 days    Patient will increase functional independence with ADLs by performing:    UE Dressing with Supervision.  LE Dressing with Supervision with AD as needed.  Grooming while standing with Supervision.  Toileting from bedside commode with Supervision for hygiene and clothing management.   Stand pivot transfers with Supervision.  Toilet  transfer to bedside commode with Supervision.                         Time Tracking:     OT Date of Treatment: 12/08/17  OT Start Time: 1042  OT Stop Time: 1112  OT Total Time (min): 30 min    Billable Minutes:Evaluation 20  Therapeutic Activity 10    MICHELLE Schuster  12/8/2017

## 2017-12-08 NOTE — PLAN OF CARE
Problem: Patient Care Overview  Goal: Plan of Care Review  POC reviewed with pt and wife, both verbalized understanding. VSS. Pt AAO x1. Pt c/o pain during shift,  relieved with prescribed meds. Pt denies nausea during shift.  Pt tolerating  Regular diet. Pt moving independently in bed.  Pt remains free of falls, injuries and trauma during shift. No distress noted at this time, will continue to monitor.

## 2017-12-08 NOTE — PLAN OF CARE
12:17 PM  SW received notification that pt will need home health at discharge. Could not fax referral via [facility] as pt is outpatient. Informed [facility]. [facility] ran benefits and pt's insurance is in network. Informed [place] that pt is discharging today.     Savannah Williamson LMSW   [facility]  Ext [phone]

## 2017-12-08 NOTE — PLAN OF CARE
JOVITA ordered DME at 0929 from [facility]. Patient discharge pending DME delivery. JOVITA left a message for Leslie with [facility] to inquire about delivery. CM awaiting answer.    Addendum on 12/8/17 at 1245: JOVITA informed by Leslie that the [facility] deliverer is currently on his way with the patient's DME. [facility] experiencing delays due to weather. CM updated patient's spouse Marybeth Sanchez.     Lyly Coker RN, CM  [facility]  [phone] -[phone]

## 2017-12-08 NOTE — OP NOTE
DATE OF PROCEDURE: 12/7/2017.     SURGEON: Bruce Corey M.D.     FIRST ASSISTANT SURGEON: Raymond Snáchez MD, PGY-4     PREOPERATIVE DIAGNOSIS: Bilateral Lumbar Radiculopathy.     POSTOPERATIVE DIAGNOSIS: Bilateral Lumbar Radiculopathy.     PROCEDURES PERFORMED:  1. Lumbar 4 laminectomy and bilateral L4/5  disectomy     ANESTHESIA: General endotracheal anesthesia.     ESTIMATED BLOOD LOSS: 50 mL.     IMPLANTS: None.     SPECIMENS: None.     FINDINGS: 2 large disc fragments.     DRAINS: None.     COMPLICATIONS: None.     SPONGE AND NEEDLE COUNT: Correct x2.     NEUROLOGICAL MONITORING: Somatosensory evoked  potential, free-running EMG.  There were no changes to SSEP baselines.  There no significant EMG activity.     REASON FOR OPERATION AND BRIEF HISTORY AND PHYSICAL: Nilesh Haas a    62 y.o. male with refractory lumbar radiculopathy and progressive bilateral lower extremity weakness in the setting of a large L4/5 disc herniation.     DESCRIPTION OF INFORMED CONSENT: I had a sit down discussion with the patient and his wife regarding the planned procedure. We specifically discussed the risks, benefits, and alternatives to surgery. We discussed the surgical procedure including the skin incision, discectomy and nerve decompression: they understand the risks include but are not limited to bleeding, infection, damage to arteries, veins and nerves, re-herniation, death, paralysis, blindness, spinal fluid leak, continued or worsening pain, the possible need for more surgery in the future as well as the possibility other unforseen and unknown complications. We talked about expected hospital stay and recovery period. All questions were answered; they understand and wish to proceed.       DESCRIPTION OF PROCEDURE: The patient was met in the preoperative area where   her low back was marked in the midline by me personally. Subsequently, he was   brought to the Operating Room where sequential compression devices were  placed   on the patient's bilateral calves and run throughout the case. The patient was   then intubated via general endotracheal anesthesia. They were then flipped prone   onto a Ulises table with Davy frame. The head was secured on a facial pillow. The eyes were personally checked by gabino found to be acceptable. The arms were held in a 90-90 position. All bony prominences were padded paying special attention to the axilla, elbows, hands, genitalia, knees, and feet. Being satisfied with positioning and confirming this to be adequate with Anesthesia, the patient's lower back was prepped and draped in normal sterile fashion.     A full timeout was then called identifying the patient, the procedural site and   level, the availability of all instruments and no specific nursing, surgical,   anesthetic or neurological monitoring concerns. Finding that it was safe to   proceed with surgery, the patient was given a weight-appropriate dose of   antibiotics by the Anesthesia Service.     I then infiltrated my planned incision with 10 mL of 1% lidocaine. I then made   a midline lumbar incision and carried dissection down through the skin and  subcutaneous tissues using combination of sharp dissection and electrocautery   where necessary. The dorsal fascia of the lumbar spine was identified and   incised in the midline and I performed a preliminary subperiosteal exposure of   what I took to be the L4 lamina as well as what I took to be   the L4/5 facet joints. At the conclusion of my preliminary dissection, I   placed a Penfield 4 elevator under what I took to be the trailing edge of the L4  lamina, took lateral radiograph and thus confirmed my level.     At this point, I finalized my exposure. I then used a high-speed drill to thin   the trailing one-third of the L4 lamina to reveal the origin of the ligamentum   flavum. I then released the ligamentum flavum from the trailing edge of the L4  lamina using a forward-angle  curette. I gently worked the ligamentum flavum   distally. Thisallowed me to visualize the underlying epidural fat and subsequently blue-whitedura. I used the Penfield 4 on the left elevator to move the thecal sac towards the   midline and brought in a nerve root retractor. An large disk bulge was then   seen in this area and I entered it with a disc knife via a vertical annulotomy. Multiple soft   fragments were removed with a pituitary. I then performed the same procedure on the right. At the conclusion of my diskectomy, I   could easily pass a McIntosh elevator along the L5 nerve root ventrally as well   as across the thecal sac. The wound was then thoroughly irrigated, including   irrigation of the disk space, which revealed no residual free fragments. I then  finalized hemostasis with FloSeal and patties. Valsalva maneuver to 40 mmHg   demonstrated no cerebrospinal fluid leak. Next, 500 mg of vancomycin powder was  placed in the deep space and deep fascia was closed with #1 Vicryl in a   figure-of-eight fashion. The superficial layer was then irrigated and closed   over a drain with 2-0 Vicryl, 3-0 Monocryl, Dermabond and Steri-Strips. A soft   dressing was placed. The drain was secured in place with 2-0 silk suture. The   patient was then flipped supine, extubated and transferred to the Recovery Room   in stable condition.

## 2017-12-08 NOTE — PROGRESS NOTES
Vital signs stable, pt comfortable, dinner served. Tolerated well, foot brace arrived fitted and given to pt

## 2017-12-08 NOTE — PROGRESS NOTES
[facility]  Orthopedics  Progress Note    Patient Name: Nilesh Sanchez  MRN: [id number]  Admission Date: 12/7/2017  Hospital Length of Stay: 1 days  Attending Provider: Bruce Corey MD  Primary Care Provider: Flavia Lange MD  Follow-up For: Procedure(s) (LRB):  DISKECTOMY-LAMINECTOMY-LUMBAR L4/5 (Bilateral)    Post-Operative Day: 1 Day Post-Op  Subjective:     Principal Problem:Lumbar disc herniation with radiculopathy    Principal Orthopedic Problem: same    Interval History: Patient seen and examined at bedside.  No acute events overnight.  Pain controlled.  Preop radiculopathy resolved.  No PT yesterday.      Review of patient's allergies indicates:   Allergen Reactions    Flonase [fluticasone] Other (See Comments)     inflammation , sx seemed to worsen over time rather than improve       Current Facility-Administered Medications   Medication    0.9%  NaCl infusion    0.9%  NaCl infusion    acetaminophen (10 mg/mL) injection 1,000 mg    acetaminophen tablet 1,000 mg    atorvastatin tablet 40 mg    bisacodyl suppository 10 mg    diphenhydrAMINE injection 25 mg    docusate sodium capsule 100 mg    hydromorphone (PF) injection 0.5 mg    lisinopril-hydrochlorothiazide 10-12.5 mg per tablet 2 tablet    methocarbamol tablet 1,500 mg    metoprolol succinate (TOPROL-XL) 24 hr tablet 50 mg    mupirocin 2 % ointment    ondansetron injection 4 mg    oxyCODONE immediate release tablet 10 mg    oxyCODONE immediate release tablet 15 mg    oxyCODONE immediate release tablet 5 mg    pantoprazole EC tablet 40 mg    polyethylene glycol packet 17 g    pregabalin capsule 75 mg    promethazine tablet 25 mg    ramelteon tablet 8 mg    sertraline tablet 100 mg    sodium chloride 0.9% flush 3 mL    sodium chloride 0.9% flush 3 mL     Objective:     Vital Signs (Most Recent):  Temp: 98 °F (36.7 °C) (12/08/17 0520)  Pulse: 71 (12/08/17 0520)  Resp: 18 (12/08/17 0520)  BP: (!) 118/58  "(12/08/17 0520)  SpO2: (!) 92 % (12/08/17 0520) Vital Signs (24h Range):  Temp:  [97.4 °F (36.3 °C)-98.6 °F (37 °C)] 98 °F (36.7 °C)  Pulse:  [66-87] 71  Resp:  [11-20] 18  SpO2:  [88 %-100 %] 92 %  BP: (118-202)/(58-94) 118/58     Weight: 120.2 kg (265 lb)  Height: 6' 1" (185.4 cm)  Body mass index is 34.96 kg/m².      Intake/Output Summary (Last 24 hours) at 12/08/17 0549  Last data filed at 12/08/17 0049   Gross per 24 hour   Intake          3156.25 ml   Output              500 ml   Net          2656.25 ml       Ortho/SPM Exam  AAOx4  NAD  RRR  No increased WOB  Dressing c/d/i  Improved strength L foot dorsiflexion, SILT  WWP extremities  SCDs in place and functioning    Significant Labs: All pertinent labs within the past 24 hours have been reviewed.    Significant Imaging: I have reviewed all pertinent imaging results/findings.    Assessment/Plan:     * Lumbar disc herniation with radiculopathy    62 y.o. male POD1 s/p L4/5 discectomy    Pain control  PT/OT: WBAT   DVT PPx: SCDs at all times when not ambulating  Abx: postop Ancef  Labs: pending  Drain: none  [place]: none    Dispo: home today        Chronic anxiety    Home meds        GERD (gastroesophageal reflux disease)    Home meds        Mixed hyperlipidemia    Home meds        Essential hypertension    Home meds              Balwinder Gaxiola MD  Orthopedics  [facility]  "

## 2017-12-09 RX ORDER — OXYCODONE AND ACETAMINOPHEN 10; 325 MG/1; MG/1
1 TABLET ORAL EVERY 4 HOURS PRN
Qty: 97 TABLET | Refills: 0 | Status: SHIPPED | OUTPATIENT
Start: 2017-12-09 | End: 2017-12-27

## 2017-12-09 RX ORDER — ONDANSETRON 8 MG/1
8 TABLET, ORALLY DISINTEGRATING ORAL EVERY 8 HOURS PRN
Qty: 42 TABLET | Refills: 0 | Status: SHIPPED | OUTPATIENT
Start: 2017-12-09 | End: 2018-02-15

## 2017-12-09 RX ORDER — METHOCARBAMOL 750 MG/1
750 TABLET, FILM COATED ORAL 4 TIMES DAILY
Qty: 37 TABLET | Refills: 0 | Status: SHIPPED | OUTPATIENT
Start: 2017-12-09 | End: 2017-12-19

## 2017-12-12 NOTE — DISCHARGE SUMMARY
[facility]  Orthopedics  Discharge Summary      Patient Name: Nilesh Sanchez  MRN: [id number]  Admission Date: 12/7/2017  Hospital Length of Stay: 1 days  Discharge Date and Time: 12/8/2017  5:43 PM  Attending Physician: Bruce Corey MD  Discharging Provider: Balwinder Gaxiola MD  Primary Care Provider: Flavia Lange MD    HPI: Nilesh Sanchez is a 62 y.o. male here for initial evaluation of low back and bilateral, L>R leg pain (Back - 4, Leg - 10).  The pain in the legs is what bothers him most.  The pain has been present for 2 weeks. The patient describes the pain as sharp and numbness/tingling.  The pain is worse with standing and walking and improved by laying down. There is associated numbness and tingling which started in the left leg but is progressively worsening and now moving to the right leg as well. There is subjective weakness in the left lower extremity.  He has started dragging his left leg when he walks.  He is scheduled for a L4/5 microdiskectomy 12/13 but is here for a second opinion.  Prior treatments have included a medrol dose pack, NSAIDs and pain medications, but no surgery.  No prior history of back injury, ESIs or surgery.    The patient denies myelopathic symptoms such as handwriting changes or difficulty with buttons/coins/keys. Denies perineal paresthesias, bowel/bladder dysfunction.    Procedure(s) (LRB):  DISKECTOMY-LAMINECTOMY-LUMBAR L4/5 (Bilateral)      Hospital Course: Patient presented for above procedure.  Tolerated it well and was discharged home POD1 after voiding, tolerating diet, ambulating, pain controlled.  Discharge instructions, follow-up appointment, and med rec are below.          Pending Diagnostic Studies:     Procedure Component Value Units Date/Time    Basic metabolic panel [[id number]] Collected:  12/07/17 1733    Order Status:  Sent Lab Status:  In process Updated:  12/07/17 1734    Specimen:  Blood from Blood     CBC auto differential [[id number]]  "Collected:  12/07/17 1733    Order Status:  Sent Lab Status:  In process Updated:  12/07/17 1734    Specimen:  Blood from Blood         Final Active Diagnoses:    Diagnosis Date Noted POA    PRINCIPAL PROBLEM:  Lumbar disc herniation with radiculopathy [M51.16] 12/04/2017 Yes    Mixed hyperlipidemia [E78.2] 09/15/2016 Yes    GERD (gastroesophageal reflux disease) [K21.9] 09/15/2016 Yes    Chronic anxiety [F41.9] 09/15/2016 Yes    Essential hypertension [I10] 09/21/2015 Yes      Problems Resolved During this Admission:    Diagnosis Date Noted Date Resolved POA      Discharged Condition: stable    Disposition: Home or Self Care    Follow Up:  Follow-up Information     Katelyn Palacios PA-C In 3 weeks.    Specialty:  Orthopedic Surgery  Contact information:  [address] [place] [id number]  [phone]                 Patient Instructions:     COMMODE FOR HOME USE   Order Specific Question Answer Comments   Type: Standard    Height: 6' 1" (1.854 m)    Weight: 120.2 kg (265 lb)    Does patient have medical equipment at home? none    Length of need (1-99 months): 1    Vendor: [facility] delivered   Expected Date of Delivery: 12/8/2017      TRANSFER TUB BENCH FOR HOME USE   Order Specific Question Answer Comments   Type of Transfer Tub Bench: Unpadded    Height: 6' 1" (1.854 m)    Weight: 120.2 kg (265 lb)    Does patient have medical equipment at home? none    Length of need (1-99 months): 1    Vendor: Other (use comments)    Expected Date of Delivery: 12/8/2017      WALKER FOR HOME USE   Order Specific Question Answer Comments   Type of Walker: Adult (5'4"-6'6")    With wheels? Yes    Height: 6' 1" (1.854 m)    Weight: 120.2 kg (265 lb)    Length of need (1-99 months): 99    Does patient have medical equipment at home? none    Please check all that apply: Patient is unable to safely ambulate without equipment.    Please check all that apply: Walker will be used for gait training.    Vendor: [facility] delivered   Expected Date of Delivery: 12/8/2017      COMMODE FOR HOME USE   Order Specific Question Answer Comments   Type: Standard    Height: 6' 1" (1.854 m)    Weight: 120.2 kg (265 lb)    Does patient have medical equipment at home? none    Length of need (1-99 months): 99    Vendor: Other (use comments)    Expected Date of Delivery: 12/8/2017      Diet general     Call MD for:  temperature >100.4     Call MD for:  persistent nausea and vomiting or diarrhea     Call MD for:  severe uncontrolled pain     Call MD for:  redness, tenderness, or signs of infection (pain, swelling, redness, odor or green/yellow discharge around incision site)     Call MD for:  difficulty breathing or increased cough     Call MD for:  severe persistent headache     Call MD for:  worsening rash     Call MD for:  persistent dizziness, light-headedness, or visual disturbances     Call MD for:  increased confusion or weakness       Medications:  Reconciled Home Medications:   Discharge Medication List as of 12/8/2017 12:54 PM      START taking these medications    Details   docusate sodium (COLACE) 100 MG capsule Take 1 capsule (100 mg total) by mouth 3 (three) times daily., Starting Thu 12/7/2017, Print      mupirocin calcium 2% nasl oint (BACTROBAN) 2 % Oint by Nasal route 2 (two) times daily., Starting Thu 12/7/2017, Until Thu 12/14/2017, Print      polyethylene glycol (GLYCOLAX) 17 gram/dose powder Take 17 g by mouth once daily., Starting Thu 12/7/2017, Print      methocarbamol (ROBAXIN) 750 MG Tab Take 1 tablet (750 mg total) by mouth 4 (four) times daily., Starting Thu 12/7/2017, Until Sun 12/17/2017, Print      ondansetron (ZOFRAN-ODT) 8 MG TbDL Take 1 tablet (8 mg total) by mouth every 8 (eight) hours as needed., Starting Thu 12/7/2017, Print      oxyCODONE-acetaminophen (PERCOCET)  mg per tablet Take 1 tablet by mouth every 4 (four) hours as needed., Starting Thu 12/7/2017, Print         CONTINUE these medications " which have NOT CHANGED    Details   atorvastatin (LIPITOR) 40 MG tablet Take 1 tablet (40 mg total) by mouth once daily., Starting Tue 11/14/2017, Normal      diclofenac (VOLTAREN) 75 MG EC tablet Take 1 tablet (75 mg total) by mouth 2 (two) times daily. With food, Starting Wed 11/29/2017, Until Sat 12/9/2017, Print      fexofenadine (ALLEGRA) 180 MG tablet Take 1 tablet (180 mg total) by mouth once daily., Starting Tue 5/16/2017, Normal      lisinopril-hydrochlorothiazide (PRINZIDE,ZESTORETIC) 20-25 mg Tab Take 1 tablet by mouth once daily., Starting Tue 11/14/2017, Normal      metoprolol succinate (TOPROL-XL) 50 MG 24 hr tablet Take 1 tablet (50 mg total) by mouth every evening., Starting Tue 11/14/2017, Normal      mupirocin (BACTROBAN) 2 % ointment Apply 1 g topically 2 (two) times daily., Historical Med      pantoprazole (PROTONIX) 40 MG tablet Take 1 tablet (40 mg total) by mouth once daily., Starting Tue 11/14/2017, Normal      sertraline (ZOLOFT) 100 MG tablet Take 1 tablet (100 mg total) by mouth once daily., Starting Tue 11/14/2017, Normal         STOP taking these medications       chlorhexidine (PERIDEX) 0.12 % solution Comments:   Reason for Stopping:         testosterone cypionate (DEPOTESTOTERONE CYPIONATE) 200 mg/mL injection Comments:   Reason for Stopping:               Balwinder Gaxiola MD  Orthopedics  [facility]

## 2017-12-15 ENCOUNTER — PATIENT MESSAGE (OUTPATIENT)
Dept: ORTHOPEDICS | Facility: CLINIC | Age: 62
End: 2017-12-15

## 2017-12-19 ENCOUNTER — DOCUMENTATION ONLY (OUTPATIENT)
Dept: FAMILY MEDICINE | Facility: CLINIC | Age: 62
End: 2017-12-19

## 2017-12-19 NOTE — PROGRESS NOTES
Received echo report from 2010    LV normal in size and systolic function. EF 70%. No regional WMA.   LA normal in size  Aortic root normal in size  RV normal in size and function  RA normal in size  All valves normal

## 2017-12-27 ENCOUNTER — OFFICE VISIT (OUTPATIENT)
Dept: ORTHOPEDICS | Facility: CLINIC | Age: 62
End: 2017-12-27
Payer: COMMERCIAL

## 2017-12-27 VITALS
HEIGHT: 73 IN | HEART RATE: 88 BPM | TEMPERATURE: 99 F | WEIGHT: 266.44 LBS | DIASTOLIC BLOOD PRESSURE: 89 MMHG | SYSTOLIC BLOOD PRESSURE: 142 MMHG | BODY MASS INDEX: 35.31 KG/M2

## 2017-12-27 DIAGNOSIS — M51.16 LUMBAR DISC HERNIATION WITH RADICULOPATHY: Primary | ICD-10-CM

## 2017-12-27 PROCEDURE — 99999 PR PBB SHADOW E&M-EST. PATIENT-LVL III: CPT | Mod: PBBFAC,,, | Performed by: ORTHOPAEDIC SURGERY

## 2017-12-27 PROCEDURE — 99024 POSTOP FOLLOW-UP VISIT: CPT | Mod: S$GLB,,, | Performed by: ORTHOPAEDIC SURGERY

## 2017-12-27 RX ORDER — TRAMADOL HYDROCHLORIDE 50 MG/1
50 TABLET ORAL EVERY 6 HOURS PRN
Qty: 60 TABLET | Refills: 0 | Status: SHIPPED | OUTPATIENT
Start: 2017-12-27 | End: 2018-02-15

## 2017-12-27 RX ORDER — TRAMADOL HYDROCHLORIDE 50 MG/1
50 TABLET ORAL EVERY 6 HOURS PRN
COMMUNITY
End: 2017-12-27 | Stop reason: SDUPTHER

## 2017-12-27 NOTE — PROGRESS NOTES
Date of Surgery: 12/7/2017    Procedure:  Lumbar 4 laminectomy and bilateral L4/5  disectomy    History: Nilesh Sanchez is seen today for follow-up following the above listed procedure. Overall the patient is doing well but today notes occasional right hip pain with walking that was present prior to surgery. It is deep stabbing pain in the outside of his right hip that does not radiate. Left drop foot is greatly improved since surgery. He is taking tramadol 50 mg TID for pain.    Exam: Incision is healing well. There is no sign of infection. Neuro exam is stable. No signs of DVT.    Radiographs: none    Assessment/Plan: Doing well postoperatively. I will plan to see the patient back for the next postop visit in 6 weeks. Thank you for the opportunity to participate in this patient's care. Please give me a call if there are any concerns or questions.

## 2018-02-15 ENCOUNTER — OFFICE VISIT (OUTPATIENT)
Dept: FAMILY MEDICINE | Facility: CLINIC | Age: 63
End: 2018-02-15
Payer: COMMERCIAL

## 2018-02-15 ENCOUNTER — LAB VISIT (OUTPATIENT)
Dept: LAB | Facility: HOSPITAL | Age: 63
End: 2018-02-15
Attending: INTERNAL MEDICINE
Payer: COMMERCIAL

## 2018-02-15 VITALS
HEIGHT: 73 IN | WEIGHT: 271.69 LBS | DIASTOLIC BLOOD PRESSURE: 80 MMHG | BODY MASS INDEX: 36.01 KG/M2 | TEMPERATURE: 98 F | SYSTOLIC BLOOD PRESSURE: 122 MMHG | HEART RATE: 72 BPM

## 2018-02-15 DIAGNOSIS — Z12.5 SCREENING FOR MALIGNANT NEOPLASM OF PROSTATE: ICD-10-CM

## 2018-02-15 DIAGNOSIS — M51.16 LUMBAR DISC HERNIATION WITH RADICULOPATHY: ICD-10-CM

## 2018-02-15 DIAGNOSIS — I10 ESSENTIAL HYPERTENSION: Primary | ICD-10-CM

## 2018-02-15 DIAGNOSIS — F41.9 CHRONIC ANXIETY: ICD-10-CM

## 2018-02-15 DIAGNOSIS — E29.1 HYPOGONADISM IN MALE: ICD-10-CM

## 2018-02-15 DIAGNOSIS — E78.2 MIXED HYPERLIPIDEMIA: ICD-10-CM

## 2018-02-15 DIAGNOSIS — K21.9 GASTROESOPHAGEAL REFLUX DISEASE, ESOPHAGITIS PRESENCE NOT SPECIFIED: ICD-10-CM

## 2018-02-15 DIAGNOSIS — I10 ESSENTIAL HYPERTENSION: ICD-10-CM

## 2018-02-15 LAB
ALBUMIN SERPL BCP-MCNC: 3.7 G/DL
ALP SERPL-CCNC: 106 U/L
ALT SERPL W/O P-5'-P-CCNC: 39 U/L
ANION GAP SERPL CALC-SCNC: 9 MMOL/L
AST SERPL-CCNC: 35 U/L
BILIRUB SERPL-MCNC: 0.3 MG/DL
BUN SERPL-MCNC: 23 MG/DL
CALCIUM SERPL-MCNC: 9.6 MG/DL
CHLORIDE SERPL-SCNC: 100 MMOL/L
CO2 SERPL-SCNC: 31 MMOL/L
COMPLEXED PSA SERPL-MCNC: 0.64 NG/ML
CREAT SERPL-MCNC: 1 MG/DL
EST. GFR  (AFRICAN AMERICAN): >60 ML/MIN/1.73 M^2
EST. GFR  (NON AFRICAN AMERICAN): >60 ML/MIN/1.73 M^2
GLUCOSE SERPL-MCNC: 98 MG/DL
POTASSIUM SERPL-SCNC: 4.4 MMOL/L
PROT SERPL-MCNC: 7.4 G/DL
SODIUM SERPL-SCNC: 140 MMOL/L
TESTOST SERPL-MCNC: 37 NG/DL
TSH SERPL DL<=0.005 MIU/L-ACNC: 2.03 UIU/ML

## 2018-02-15 PROCEDURE — 36415 COLL VENOUS BLD VENIPUNCTURE: CPT | Mod: PN

## 2018-02-15 PROCEDURE — 99214 OFFICE O/P EST MOD 30 MIN: CPT | Mod: 24,S$GLB,, | Performed by: INTERNAL MEDICINE

## 2018-02-15 PROCEDURE — 84153 ASSAY OF PSA TOTAL: CPT

## 2018-02-15 PROCEDURE — 84443 ASSAY THYROID STIM HORMONE: CPT

## 2018-02-15 PROCEDURE — 99999 PR PBB SHADOW E&M-EST. PATIENT-LVL III: CPT | Mod: PBBFAC,,, | Performed by: INTERNAL MEDICINE

## 2018-02-15 PROCEDURE — 84403 ASSAY OF TOTAL TESTOSTERONE: CPT

## 2018-02-15 PROCEDURE — 80053 COMPREHEN METABOLIC PANEL: CPT

## 2018-02-15 RX ORDER — PANTOPRAZOLE SODIUM 40 MG/1
40 TABLET, DELAYED RELEASE ORAL DAILY
Qty: 90 TABLET | Refills: 2 | Status: SHIPPED | OUTPATIENT
Start: 2018-02-15 | End: 2018-10-19 | Stop reason: SDUPTHER

## 2018-02-15 RX ORDER — METOPROLOL SUCCINATE 50 MG/1
50 TABLET, EXTENDED RELEASE ORAL NIGHTLY
Qty: 90 TABLET | Refills: 2 | Status: SHIPPED | OUTPATIENT
Start: 2018-02-15 | End: 2018-10-19 | Stop reason: SDUPTHER

## 2018-02-15 RX ORDER — SERTRALINE HYDROCHLORIDE 100 MG/1
100 TABLET, FILM COATED ORAL DAILY
Qty: 90 TABLET | Refills: 2 | Status: SHIPPED | OUTPATIENT
Start: 2018-02-15 | End: 2018-10-19 | Stop reason: SDUPTHER

## 2018-02-15 RX ORDER — LISINOPRIL AND HYDROCHLOROTHIAZIDE 20; 25 MG/1; MG/1
1 TABLET ORAL DAILY
Qty: 90 TABLET | Refills: 2 | Status: SHIPPED | OUTPATIENT
Start: 2018-02-15 | End: 2018-10-19 | Stop reason: SDUPTHER

## 2018-02-15 RX ORDER — ATORVASTATIN CALCIUM 40 MG/1
40 TABLET, FILM COATED ORAL NIGHTLY
Qty: 90 TABLET | Refills: 2 | Status: SHIPPED | OUTPATIENT
Start: 2018-02-15 | End: 2018-10-19 | Stop reason: SDUPTHER

## 2018-02-15 NOTE — PROGRESS NOTES
Assessment and Plan:    1. Essential hypertension  At goal today, continue same medications. Repeat CMP.  - Comprehensive metabolic panel; Future  - TSH; Future  - lisinopril-hydrochlorothiazide (PRINZIDE,ZESTORETIC) 20-25 mg Tab; Take 1 tablet by mouth once daily.  Dispense: 90 tablet; Refill: 2  - metoprolol succinate (TOPROL-XL) 50 MG 24 hr tablet; Take 1 tablet (50 mg total) by mouth every evening.  Dispense: 90 tablet; Refill: 2    2. Mixed hyperlipidemia  - atorvastatin (LIPITOR) 40 MG tablet; Take 1 tablet (40 mg total) by mouth nightly.  Dispense: 90 tablet; Refill: 2    3. Gastroesophageal reflux disease, esophagitis presence not specified  Doing well on PPI with symptoms without PPI. Consider weaning down after lifestyle modifications.   - pantoprazole (PROTONIX) 40 MG tablet; Take 1 tablet (40 mg total) by mouth once daily.  Dispense: 90 tablet; Refill: 2    4. Chronic anxiety  Doing well on zoloft, will continue.  - sertraline (ZOLOFT) 100 MG tablet; Take 1 tablet (100 mg total) by mouth once daily.  Dispense: 90 tablet; Refill: 2    5. Hypogonadism in male  Has been off of testosterone since mid December. Would like to recheck, consider supplementing if persistently low readings, but notably not having symptoms that would necessarily be from hypogonadism (i.e. fatigue only).   - Testosterone; Future    6. Lumbar disc herniation with radiculopathy  Continue follow up with surgery. Still having some foot drop which is known.     7. Screening for malignant neoplasm of prostate  - PSA, Screening; Future        ______________________________________________________________________  Subjective:    Chief Complaint:  Follow up chronic medical conditions    HPI:  Nilesh is a 62 y.o. year old man here for follow up of chronic medical conditions.    Back pain- Since he was last here he had a microdiscectomy for disk herniation associated with leg numbness. Since the surgery he has had some left foot drop. This was  improving initially but has not been improving much recently.     HLD- On atorvastatin, doing well on this medication.     GERD- On protonix, doing well with this.    HTN- On lisinopril/HCTZ and metoprolol. Doing well on these medications. Had some elevated readings in the setting of pain, but has been decently well controlled when not in pain.     Anxiety- Has been controlled on zoloft.     He had a mild HEAVENLY in the setting of surgery.    Hypogonadism- His Urologist retired a few months ago so he has run out of the testosterone shots. He effectively weaned himself down and has been off of this since Mid-December. He has been having some mild fatigue, but no other obvious symptoms.    Medications:  Current Outpatient Prescriptions on File Prior to Visit   Medication Sig Dispense Refill    atorvastatin (LIPITOR) 40 MG tablet Take 1 tablet (40 mg total) by mouth once daily. (Patient taking differently: Take 40 mg by mouth nightly. ) 30 tablet 11    fexofenadine (ALLEGRA) 180 MG tablet Take 1 tablet (180 mg total) by mouth once daily. 90 tablet 1    lisinopril-hydrochlorothiazide (PRINZIDE,ZESTORETIC) 20-25 mg Tab Take 1 tablet by mouth once daily. 30 tablet 5    metoprolol succinate (TOPROL-XL) 50 MG 24 hr tablet Take 1 tablet (50 mg total) by mouth every evening. 30 tablet 5    pantoprazole (PROTONIX) 40 MG tablet Take 1 tablet (40 mg total) by mouth once daily. 30 tablet 11    sertraline (ZOLOFT) 100 MG tablet Take 1 tablet (100 mg total) by mouth once daily. 30 tablet 5    [DISCONTINUED] docusate sodium (COLACE) 100 MG capsule Take 1 capsule (100 mg total) by mouth 3 (three) times daily. 90 capsule 0    [DISCONTINUED] mupirocin (BACTROBAN) 2 % ointment Apply 1 g topically 2 (two) times daily.      [DISCONTINUED] ondansetron (ZOFRAN-ODT) 8 MG TbDL Take 1 tablet (8 mg total) by mouth every 8 (eight) hours as needed. 42 tablet 0    [DISCONTINUED] polyethylene glycol (GLYCOLAX) 17 gram/dose powder Take 17 g by  "mouth once daily. 1530 g 0    [DISCONTINUED] traMADol (ULTRAM) 50 mg tablet Take 1 tablet (50 mg total) by mouth every 6 (six) hours as needed for Pain. 60 tablet 0     No current facility-administered medications on file prior to visit.        Review of Systems:  Review of Systems   Constitutional: Negative for activity change and unexpected weight change.   HENT: Negative for hearing loss, rhinorrhea and trouble swallowing.    Eyes: Negative for discharge and visual disturbance.   Respiratory: Negative for chest tightness and wheezing.    Cardiovascular: Negative for chest pain and palpitations.   Gastrointestinal: Negative for blood in stool, constipation, diarrhea and vomiting.   Endocrine: Negative for polydipsia and polyuria.   Genitourinary: Negative for difficulty urinating, hematuria and urgency.   Musculoskeletal: Negative for arthralgias, joint swelling and neck pain.   Neurological: Negative for weakness and headaches.   Psychiatric/Behavioral: Negative for confusion and dysphoric mood.       Past Medical History:  Past Medical History:   Diagnosis Date    Allergic rhinosinusitis     Anxiety     Back pain     with spasms    Diverticulitis     s/p partial colectomy    GERD (gastroesophageal reflux disease)     Hyperlipidemia     Hypertension        Objective:    Vitals:  Vitals:    02/15/18 1531   BP: 122/80   Pulse: 72   Temp: 98.1 °F (36.7 °C)   Weight: 123.2 kg (271 lb 11.5 oz)   Height: 6' 1" (1.854 m)   PainSc: 0-No pain       Physical Exam   Constitutional: He is oriented to person, place, and time. He appears well-developed and well-nourished. No distress.   HENT:   Mouth/Throat: Oropharynx is clear and moist.   Eyes: No scleral icterus.   Cardiovascular: Normal rate and regular rhythm.    No murmur heard.  Pulmonary/Chest: Effort normal and breath sounds normal. No respiratory distress. He has no wheezes.   Musculoskeletal: He exhibits no edema.   Neurological: He is alert and oriented to " person, place, and time.   Skin: Skin is warm and dry.   Psychiatric: He has a normal mood and affect. His behavior is normal.   Vitals reviewed.      Data:  Previous labs reviewed and pertinent for increased Cr to 1.48 while hospitalized, was 0.9 after discharge..      Flavia Lange MD  Internal Medicine

## 2018-02-19 ENCOUNTER — PATIENT MESSAGE (OUTPATIENT)
Dept: FAMILY MEDICINE | Facility: CLINIC | Age: 63
End: 2018-02-19

## 2018-02-21 ENCOUNTER — OFFICE VISIT (OUTPATIENT)
Dept: ORTHOPEDICS | Facility: CLINIC | Age: 63
End: 2018-02-21
Payer: COMMERCIAL

## 2018-02-21 VITALS — HEIGHT: 73 IN | WEIGHT: 272.25 LBS | BODY MASS INDEX: 36.08 KG/M2

## 2018-02-21 DIAGNOSIS — M51.16 LUMBAR DISC HERNIATION WITH RADICULOPATHY: Primary | ICD-10-CM

## 2018-02-21 PROCEDURE — 99999 PR PBB SHADOW E&M-EST. PATIENT-LVL II: CPT | Mod: PBBFAC,,, | Performed by: ORTHOPAEDIC SURGERY

## 2018-02-21 PROCEDURE — 99024 POSTOP FOLLOW-UP VISIT: CPT | Mod: S$GLB,,, | Performed by: ORTHOPAEDIC SURGERY

## 2018-02-21 NOTE — PROGRESS NOTES
Date of Surgery: 12/7/2017    Procedure:  Lumbar 4 laminectomy and bilateral L4/5  disectomy    History: Nilesh Sanchez is seen today for follow-up following the above listed procedure. He is doing well. He has no pain. He does still have some L foot drop.    Exam: Incision is healing well. There is no sign of infection. Neuro exam is notable for 3/5 L TA/EHL, compared to 1/5 preop    Radiographs: none    Assessment/Plan: Doing well postoperatively. I will plan to see the patient back for the next postop visit in 6 weeks. Thank you for the opportunity to participate in this patient's care. Please give me a call if there are any concerns or questions.    I re-assured the patient, I think he will continue to improve.

## 2018-02-28 ENCOUNTER — TELEPHONE (OUTPATIENT)
Dept: ORTHOPEDICS | Facility: CLINIC | Age: 63
End: 2018-02-28

## 2018-02-28 ENCOUNTER — PATIENT MESSAGE (OUTPATIENT)
Dept: ORTHOPEDICS | Facility: CLINIC | Age: 63
End: 2018-02-28

## 2018-02-28 DIAGNOSIS — M21.379 FOOT-DROP, UNSPECIFIED LATERALITY: Primary | ICD-10-CM

## 2018-03-01 ENCOUNTER — OFFICE VISIT (OUTPATIENT)
Dept: UROLOGY | Facility: CLINIC | Age: 63
End: 2018-03-01
Payer: COMMERCIAL

## 2018-03-01 ENCOUNTER — TELEPHONE (OUTPATIENT)
Dept: UROLOGY | Facility: CLINIC | Age: 63
End: 2018-03-01

## 2018-03-01 ENCOUNTER — LAB VISIT (OUTPATIENT)
Dept: LAB | Facility: HOSPITAL | Age: 63
End: 2018-03-01
Attending: UROLOGY
Payer: COMMERCIAL

## 2018-03-01 VITALS — WEIGHT: 270.31 LBS | BODY MASS INDEX: 35.82 KG/M2 | HEIGHT: 73 IN

## 2018-03-01 DIAGNOSIS — E29.1 MALE HYPOGONADISM: Primary | ICD-10-CM

## 2018-03-01 DIAGNOSIS — N40.1 BPH WITH URINARY OBSTRUCTION: ICD-10-CM

## 2018-03-01 DIAGNOSIS — N13.8 BPH WITH URINARY OBSTRUCTION: ICD-10-CM

## 2018-03-01 DIAGNOSIS — E29.1 HYPOGONADISM IN MALE: Primary | ICD-10-CM

## 2018-03-01 DIAGNOSIS — E29.1 MALE HYPOGONADISM: ICD-10-CM

## 2018-03-01 LAB — PROLACTIN SERPL IA-MCNC: 4.9 NG/ML

## 2018-03-01 PROCEDURE — 99204 OFFICE O/P NEW MOD 45 MIN: CPT | Mod: S$GLB,,, | Performed by: UROLOGY

## 2018-03-01 PROCEDURE — 36415 COLL VENOUS BLD VENIPUNCTURE: CPT

## 2018-03-01 PROCEDURE — 99999 PR PBB SHADOW E&M-EST. PATIENT-LVL III: CPT | Mod: PBBFAC,,, | Performed by: UROLOGY

## 2018-03-01 PROCEDURE — 84146 ASSAY OF PROLACTIN: CPT

## 2018-03-01 RX ORDER — TESTOSTERONE 20.25 MG/1.25G
GEL TOPICAL
Qty: 1 BOTTLE | Refills: 5 | Status: SHIPPED | OUTPATIENT
Start: 2018-03-01 | End: 2018-10-18

## 2018-03-01 NOTE — PROGRESS NOTES
CHIEF COMPLAINT:    Mr. Sanchez is a 62 y.o. male presenting for a consultation at the request of Dr. Lange. Patient presents with hypogonadism.    PRESENTING ILLNESS:    Nilesh Sanchez is a 62 y.o. male who c/o hypogonadism.  He has been on TRT via injections by an outside physician.  However, he'd now like to transfer care here.  No injection in several months.  His symptoms of low T are fatigue and a decreased libido.    He has nocturia x 1-2.  Good FOS. No hematuria.  No dysuria.    He denies ED.    REVIEW OF SYSTEMS:    Nilesh Sanchez denies headache, blurred vision, fever, nausea, vomiting, chills, abdominal pain, bleeding per rectum, cough, SOB, recent loss of consciousness, recent mental status changes, seizures, dizziness, or upper or lower extremity weakness.    BRIANNE  1. 3  2. 4  3. 3  4. 3  5. 3      PATIENT HISTORY:    Past Medical History:   Diagnosis Date    Allergic rhinosinusitis     Anxiety     Back pain     with spasms    Diverticulitis     s/p partial colectomy    GERD (gastroesophageal reflux disease)     Hyperlipidemia     Hypertension        Past Surgical History:   Procedure Laterality Date    COLECTOMY      18in of sigmoid colon removed    ESOPHAGOGASTRODUODENOSCOPY      LUMBAR DISC SURGERY  12/07/2017    L4/5    UPPER GASTROINTESTINAL ENDOSCOPY         Family History   Problem Relation Age of Onset    Heart disease Mother     Heart disease Father     Heart disease Sister     Atrial fibrillation Sister     Heart disease Brother        Social History     Social History    Marital status:      Spouse name: N/A    Number of children: N/A    Years of education: N/A     Occupational History    Not on file.     Social History Main Topics    Smoking status: Never Smoker    Smokeless tobacco: Never Used    Alcohol use 9.6 - 10.8 oz/week     2 Glasses of wine, 14 - 16 Standard drinks or equivalent per week    Drug use: No    Sexual activity: Yes     Partners: Female      Other Topics Concern    Not on file     Social History Narrative    Owns company making leather belts.        Allergies:  Flonase [fluticasone]    Medications:    Current Outpatient Prescriptions:     atorvastatin (LIPITOR) 40 MG tablet, Take 1 tablet (40 mg total) by mouth nightly., Disp: 90 tablet, Rfl: 2    fexofenadine (ALLEGRA) 180 MG tablet, Take 1 tablet (180 mg total) by mouth once daily., Disp: 90 tablet, Rfl: 1    lisinopril-hydrochlorothiazide (PRINZIDE,ZESTORETIC) 20-25 mg Tab, Take 1 tablet by mouth once daily., Disp: 90 tablet, Rfl: 2    metoprolol succinate (TOPROL-XL) 50 MG 24 hr tablet, Take 1 tablet (50 mg total) by mouth every evening., Disp: 90 tablet, Rfl: 2    pantoprazole (PROTONIX) 40 MG tablet, Take 1 tablet (40 mg total) by mouth once daily., Disp: 90 tablet, Rfl: 2    sertraline (ZOLOFT) 100 MG tablet, Take 1 tablet (100 mg total) by mouth once daily., Disp: 90 tablet, Rfl: 2    PHYSICAL EXAMINATION:    The patient generally appears in good health, is appropriately interactive, and is in no apparent distress.     Eyes: anicteric sclerae, moist conjunctivae; no lid-lag; PERRLA     HENT: Atraumatic; oropharynx clear with moist mucous membranes and no mucosal ulcerations;normal hard and soft palate.  No evidence of lymphadenopathy.    Neck: Trachea midline.  No thyromegaly.    Musculoskeletal: No abnormal gait.    Skin: No lesions.    Mental: Cooperative with normal affect.  Is oriented to time, place, and person.    Neuro: Grossly intact.    Chest: Normal inspiratory effort.   No accessory muscles.  No audible wheezes.  Respirations symmetric on inspiration and expiration.    Heart: Regular rhythm.      Abdomen:  Soft, non-tender. No masses or organomegaly. Bladder is not palpable. No evidence of flank discomfort. No evidence of inguinal hernia.    Genitourinary: The penis has no evidence of plaques or induration. The urethral meatus is normal. The testes, epididymides, and cord  structures are normal in size and contour bilaterally. The scrotum is normal in size and contour.    Normal anal sphincter tone. No rectal mass.    The prostate is 30 g. Normal landmarks. Lateral sulci. Median furrow intact.  No nodularity or induration. Seminal vesicles are normal.    Extremities: No clubbing, cyanosis, or edema      LABS:      Lab Results   Component Value Date    PSA 0.64 02/15/2018       IMPRESSION:    Encounter Diagnoses   Name Primary?    Hypogonadism in male Yes    BPH with urinary obstruction          PLAN:    1. Will draw a prolactin as this hasn't been done before.  2. Discussed the risks and benefits of testosterone replacement today.  This included possible cardiac risks.  He would like to try this.  Will check a T in 2 weeks and adjust the dose of TRT if necessary.  He can then RTC 6 months with T, PSA, CBC, hepatic panel, lipid panel.  A new Rx for Androgel 1.62% was given today.      Copy to:

## 2018-03-01 NOTE — PATIENT INSTRUCTIONS
What is this medicine?  TESTOSTERONE (abdon TOS ter one) is the main male hormone. It supports normal male traits such as muscle growth, facial hair, and deep voice. This gel is used in males to treat low testosterone levels.  This medicine may be used for other purposes; ask your health care provider or pharmacist if you have questions.  What should I tell my health care provider before I take this medicine?  They need to know if you have any of these conditions:  · breast cancer  · diabetes  · heart disease  · if a female partner is pregnant or trying to get pregnant  · kidney disease  · liver disease  · lung disease  · prostate cancer, enlargement  · an unusual or allergic reaction to testosterone, soy proteins, other medicines, foods, dyes, or preservatives  · pregnant or trying to get pregnant  · breast-feeding  How should I use this medicine?  This medicine is for external use only. This medicine is applied at the same time every day (preferably in the morning) to clean, dry, intact skin. If you take a bath or shower in the morning, apply the gel after the bath or shower. Follow the directions on the prescription label. Make sure that you are using your testosterone gel product correctly and applying it only to the appropriate skin area (see below). Allow the skin to dry a few minutes then cover with clothing to prevent others from coming in contact with the medicine on your skin. The gel is flammable. Avoid fire, flame, or smoking until the gel has dried. Wash your hands with soap and water after use.  For AndroGel Packets: Open the packet(s) needed for your dose. You can put the entire dose into your palm all at once or just a little at a time to apply. If you prefer, you can instead squeeze the gel directly onto the area you are applying it to. Apply on the shoulders as directed. Do not apply to the scrotum or genitals. Be sure you use the correct total dose. It is best to wait 5 to 6 hours after application  of the gel before showering or swimming.  For AndroGel 1%: Pump the dose into the palm of your hand. You can put the entire dose into your palm all at once or just a little at a time to apply. If you prefer, you can instead pump the gel directly onto the area you are applying it to. Apply on the shoulders as directed. Do not apply to the scrotum or genitals. Be sure you use the correct total dose. It is best to wait for 5 to 6 hours after application of the gel before showering or swimming.  For AndroGel 1.62%: Pump the dose into the palm of your hand. Dispense one pump of gel at a time into the palm of your hand before applying it. If you prefer, you can instead pump the gel directly onto the area you are applying it to. Apply on the shoulders and upper arms as directed. Do not apply to other parts of the body including the abdomen or genitals. Be sure you use the correct total dose. It is best to wait 2 hours after application of the gel before washing, showering, or swimming.  For Testim: Open the tube(s) needed for your dose. Squeeze the gel from the tube into the palm of your hand. Apply on the shoulders or upper arms as directed. Do not apply to the scrotum, genitals, or abdomen. Be sure you use the correct total dose. Do not shower or swim for at least 2 hours after application of the gel.  For Fortesta: Use the multi-dose pump to pump the gel directly onto the area you are applying it to. Apply on the thighs as directed. Do not apply to the abdomen, penis, scrotum, shoulders or upper arms. Gently rub the gel onto the skin using your finger. Be sure you use the correct total dose. Do not shower or swim for at least 2 hours after application of the gel.  For Axiron: Use the multi-dose pump to pump the gel into the applicator.  Apply under the arm as directed.  Do not apply to other locations.  Do not shower or swim for at least 2 hours after application of the gel.  A special MedGuide will be given to you by  the pharmacist with each prescription and refill. Be sure to read this information carefully each time.  Talk to your pediatrician regarding the use of this medicine in children. Special care may be needed.  Overdosage: If you think you have taken too much of this medicine contact a poison control center or emergency room at once.  NOTE: This medicine is only for you. Do not share this medicine with others.  What if I miss a dose?  If you miss a dose, use it as soon as you can. If it is almost time for your next dose, use only that dose. Do not use double or extra doses.  What may interact with this medicine?  · medicines for diabetes  · medicines that treat or prevent blood clots like warfarin  · oxyphenbutazone  · propranolol  · steroid medicines like prednisone or cortisone  This list may not describe all possible interactions. Give your health care provider a list of all the medicines, herbs, non-prescription drugs, or dietary supplements you use. Also tell them if you smoke, drink alcohol, or use illegal drugs. Some items may interact with your medicine.  What should I watch for while using this medicine?  Visit your doctor or health care professional for regular checks on your progress. They will need to check the level of testosterone in your blood.  This medicine can transfer from your body to others. If a person or pet comes in contact with the area where this medicine was applied to your skin, they may have a serious risk of side effects. If you cannot avoid skin-to-skin contact with another person, make sure the site where this medicine was applied is covered with clothing. If accidental contact happens, the skin of the person or pet should be washed right away with soap and water. Also, a female partner who is pregnant or trying to get pregnant should avoid contact with the gel or treated skin.  This medicine may affect blood sugar levels. If you have diabetes, check with your doctor or health care  professional before you change your diet or the dose of your diabetic medicine.  This drug is banned from use in athletes by most athletic organizations.  What side effects may I notice from receiving this medicine?  Side effects that you should report to your doctor or health care professional as soon as possible:  · allergic reactions like skin rash, itching or hives, swelling of the face, lips, or tongue  · breast enlargement  · breathing problems  · changes in mood, especially anger, depression, or rage  · dark urine  · general ill feeling or flu-like symptoms  · light-colored stools  · loss of appetite, nausea  · nausea, vomiting  · right upper belly pain  · stomach pain  · swelling of ankles  · too frequent or persistent erections  · trouble passing urine or change in the amount of urine  · unusually weak or tired  · yellowing of the eyes or skin  Side effects that usually do not require medical attention (report to your doctor or health care professional if they continue or are bothersome):  · acne  · change in sex drive or performance  · hair loss  · headache  This list may not describe all possible side effects. Call your doctor for medical advice about side effects. You may report side effects to FDA at 1-453-FDA-4463.  Where should I keep my medicine?  Keep out of the reach of children. This medicine can be abused. Keep your medicine in a safe place to protect it from theft. Do not share this medicine with anyone. Selling or giving away this medicine is dangerous and against the law.  Store at room temperature between 15 to 30 degrees C (59 to 86 degrees F). Keep closed until use. Protect from heat and light. This medicine is flammable. Avoid exposure to heat, fire, flame, and smoking. Throw away any unused medicine after the expiration date.  NOTE:This sheet is a summary. It may not cover all possible information. If you have questions about this medicine, talk to your doctor, pharmacist, or health care  provider. Copyright© 2011 [employer]

## 2018-03-05 ENCOUNTER — PATIENT MESSAGE (OUTPATIENT)
Dept: UROLOGY | Facility: CLINIC | Age: 63
End: 2018-03-05

## 2018-03-07 ENCOUNTER — PATIENT MESSAGE (OUTPATIENT)
Dept: UROLOGY | Facility: CLINIC | Age: 63
End: 2018-03-07

## 2018-03-08 ENCOUNTER — CLINICAL SUPPORT (OUTPATIENT)
Dept: REHABILITATION | Facility: HOSPITAL | Age: 63
End: 2018-03-08
Attending: ORTHOPAEDIC SURGERY
Payer: COMMERCIAL

## 2018-03-08 DIAGNOSIS — R29.898 WEAKNESS OF FOOT, LEFT: Primary | ICD-10-CM

## 2018-03-08 PROCEDURE — G8978 MOBILITY CURRENT STATUS: HCPCS | Mod: CK

## 2018-03-08 PROCEDURE — 97110 THERAPEUTIC EXERCISES: CPT

## 2018-03-08 PROCEDURE — 97161 PT EVAL LOW COMPLEX 20 MIN: CPT

## 2018-03-08 PROCEDURE — G8979 MOBILITY GOAL STATUS: HCPCS | Mod: CJ

## 2018-03-08 NOTE — PROGRESS NOTES
[facility]   PATIENT EVALUATION    Date: 03/08/2018  Start Time: 300pm  Stop Time: 402pm    Patient Name: Nilesh Sanchez  Clinic Number: [id number]  Age: 62 y.o.  Gender: male    Diagnosis:   Encounter Diagnosis   Name Primary?    Weakness of foot, left Yes       Referring Physician: Bruce Corey MD  Treatment Orders: PT Eval and Treat      History     Past Medical History:   Diagnosis Date    Allergic rhinosinusitis     Anxiety     Back pain     with spasms    Diverticulitis     s/p partial colectomy    GERD (gastroesophageal reflux disease)     Hyperlipidemia     Hypertension        Current Outpatient Prescriptions   Medication Sig    atorvastatin (LIPITOR) 40 MG tablet Take 1 tablet (40 mg total) by mouth nightly.    fexofenadine (ALLEGRA) 180 MG tablet Take 1 tablet (180 mg total) by mouth once daily.    lisinopril-hydrochlorothiazide (PRINZIDE,ZESTORETIC) 20-25 mg Tab Take 1 tablet by mouth once daily.    metoprolol succinate (TOPROL-XL) 50 MG 24 hr tablet Take 1 tablet (50 mg total) by mouth every evening.    pantoprazole (PROTONIX) 40 MG tablet Take 1 tablet (40 mg total) by mouth once daily.    sertraline (ZOLOFT) 100 MG tablet Take 1 tablet (100 mg total) by mouth once daily.    testosterone (ANDROGEL) 20.25 mg/1.25 gram (1.62 %) GlPm Apply 2 pumps to shoulders daily     No current facility-administered medications for this visit.        Review of patient's allergies indicates:   Allergen Reactions    Flonase [fluticasone] Other (See Comments)     inflammation , sx seemed to worsen over time rather than improve         Subjective     History of Present Condition: left drop foot secondary to lumbar disc injury Nov 2017.      Onset Date: Nov 2017  Date of Surgery: Dec 7, 2017  Precautions: left drop foot    Mechanism of Injury: sudden    Pain Location: c/o of pain in the left hip at times.  Noticed after surgery with prolonged walking   Pain Description:  pinching   Current Pain: 0/10  Least Pain: 0/10  Worst Pain: 7/10  Aggravating Factors: walking   Relieving Factors: rest    Diagnostic Tests:  No imaging on left hip  Prior Therapy: home health therapy post back surgery     Occupation: sales  Job Status: working  Job Duties: sales and traveling     Sports/Recreational Activities: walking  Extremity Dominance: right     Prior Level of Function: Independent  Functional Deficits Leading to Referral/Nature of Injury: pain with walking in the hip, weakness in the foot   Patient Therapy Goals: walk better   Cultural/Environmental/Spiritual Barriers to Treatment or Learning: nil      Objective     Observation: left foot drop gait  Posture: WNL post lumbar disc surgery   Gait: foot drop left   Functional mobility/squat: stiff, shifted to the right     Dermatomes: intact-  LE dermatomes were intact despite complaints of numbness in the LLE  Myotomes: weakness noted at L4-5 myotome tib anterior   DTRs: patella and achilles  1+ bilaterall- normal    Palpation: WNL for his age.  No pain noted at time of eval.    Range of Motion   Left ankle: Active/passive  PF: 0/10  DF: 35/40  IV: 15/20  EV: 5/9  WT BEAR DF n/a    Right ankle:  PF: WNL  DF: WNL  IV: WNL  EV: WNL  WT BEAR DF n/a     Joint Mobility: mild stiffness noted in the left ankle, but overall no restrictions noted  Flexibility: WNL    Strength   Left ankle:  PF: 4/5   DF: 3/5  IV: 3/5  EV: 3/5    Right ankle:  PF: 5/5  DF: 5/5  IV: 5/5  EV: 5/5      Special Tests: hip impingement test- mild positive    SL Calf Raise R/L able to perform but with compensation      Treatment:   Therapeutic exercises for HEP 30 min :  HS stretch, glute stretch, knee rocking, knee to chest, bridging, T-spine rotations, squats, wall sits with alt leg , long arc quad sets, and ankle exercises with elastic band       PT reviewed FOTO scores for Nilesh Sanchez on 3/8/18  FOTO score: 50    Functional Limitations Reports - G  Codes  Category: mobility  Tool: FOTO      Current ():  CK  Goal (): CJ  Discharge ():  n/a             Assessment     This is a 62 y.o. male referred to outpatient physical therapy and presents with a medical diagnosis of left drop foot and demonstrates limitations as described in the problem list. Pt underwent spinal surgery back in Dec 2017 for lumbar disc replacement and as a result of his injury displays drop foot in the left lower extremity.  Pt states that he had three weeks of home health physical therapy and was discharged back to work.  He recently began to notice his drop foot worsening and also left hip pain.  He made some adjustments to his gait and his left hip pain went away.  Overall he presents with significant weakness in the left lower extremity and stiffness grossly throughout.  He c/o of numbness and tingling at times, but no pain in the foot and currently none in the hip.  His goal for today's evaluation was to get a HEP that he could do on his own for his hip and foot.  Pt was given HEP to address weaknesses but was advised that he needs to look at participating in a global fitness program to address his decline in mobility.   Pt demonstrates fair rehab potential. Pt will benefit from physcial therapy services in order to maximize pain free and/or functional use of left foot, however at this time would like to stick with an HEP only.  Pt was advised to return to PT in 3-4 weeks to modify his HEP or return if he had any questions.       Medical necessity is demonstrated by the following problem list:   - Pain limits function of effected part for all activities  - Unable to participate in daily activities   - Requires skilled supervision to complete and progress HEP  - Fall risk - impaired balance   - Continued inability to participate in vocational pursuits          Short Term Goals (4 Weeks):       - Pt independent with HEP with progressions.     Long Term Goals (6-8 Weeks):  -  return to PT to update HEP as desired by pt.       Plan     D/c pt to HEP    Therapist: David Gomez, PT    I CERTIFY THE NEED FOR THESE SERVICES FURNISHED UNDER THIS PLAN OF TREATMENT AND WHILE UNDER MY CARE    Physician's comments: ____________________________________________________________________________________________________________________________________________    Physician's Name: ___________________________________

## 2018-03-16 ENCOUNTER — TELEPHONE (OUTPATIENT)
Dept: NEUROSURGERY | Facility: CLINIC | Age: 63
End: 2018-03-16

## 2018-03-16 NOTE — TELEPHONE ENCOUNTER
Surgery cancelled    ----- Message from Nia Ellison LPN sent at 12/4/2017  2:43 PM CST -----  3 month phone f/u bilat microdiscectomy

## 2018-04-03 ENCOUNTER — PATIENT MESSAGE (OUTPATIENT)
Dept: UROLOGY | Facility: CLINIC | Age: 63
End: 2018-04-03

## 2018-04-04 ENCOUNTER — PATIENT MESSAGE (OUTPATIENT)
Dept: UROLOGY | Facility: CLINIC | Age: 63
End: 2018-04-04

## 2018-04-18 ENCOUNTER — OFFICE VISIT (OUTPATIENT)
Dept: ORTHOPEDICS | Facility: CLINIC | Age: 63
End: 2018-04-18
Payer: COMMERCIAL

## 2018-04-18 VITALS — HEIGHT: 73 IN | WEIGHT: 266.44 LBS | BODY MASS INDEX: 35.31 KG/M2

## 2018-04-18 DIAGNOSIS — Z98.890 HISTORY OF LUMBAR DISCECTOMY: Primary | ICD-10-CM

## 2018-04-18 PROCEDURE — 99999 PR PBB SHADOW E&M-EST. PATIENT-LVL II: CPT | Mod: PBBFAC,,, | Performed by: ORTHOPAEDIC SURGERY

## 2018-04-18 PROCEDURE — 99212 OFFICE O/P EST SF 10 MIN: CPT | Mod: S$GLB,,, | Performed by: ORTHOPAEDIC SURGERY

## 2018-04-18 NOTE — PROGRESS NOTES
Date of Surgery: 12/7/2017    Procedure:  Lumbar 4 laminectomy and bilateral L4/5  disectomy    History: Nilesh Sanchez is seen today for follow-up following the above listed procedure. He is doing well. He has no pain. He has made great improvement in his left foot drop. He still has some numbness inbetween his toes but he is overall happy with his progress. He no longer wears AFOs    Exam: Incision is healing well. There is no sign of infection. Neuro exam is notable for 4+/5 L TA/EHL, compared to 1/5 preop    Radiographs: none    Assessment/Plan: Doing well postoperatively. I will plan to see the patient back for the next postop visit in 3 months or so Thank you for the opportunity to participate in this patient's care. Please give me a call if there are any concerns or questions.    I re-assured the patient, I think he will continue to improve.    I spent 10 minutes with the patient of which greater than 1/2 the time was devoted to counciling the patient regarding treatment options.

## 2018-04-20 ENCOUNTER — TELEPHONE (OUTPATIENT)
Dept: UROLOGY | Facility: CLINIC | Age: 63
End: 2018-04-20

## 2018-04-20 ENCOUNTER — LAB VISIT (OUTPATIENT)
Dept: LAB | Facility: HOSPITAL | Age: 63
End: 2018-04-20
Attending: UROLOGY
Payer: COMMERCIAL

## 2018-04-20 DIAGNOSIS — E29.1 HYPOGONADISM IN MALE: ICD-10-CM

## 2018-04-20 LAB — TESTOST SERPL-MCNC: 363 NG/DL

## 2018-04-20 PROCEDURE — 84403 ASSAY OF TOTAL TESTOSTERONE: CPT

## 2018-04-20 PROCEDURE — 36415 COLL VENOUS BLD VENIPUNCTURE: CPT | Mod: PN

## 2018-06-12 ENCOUNTER — PATIENT MESSAGE (OUTPATIENT)
Dept: ORTHOPEDICS | Facility: CLINIC | Age: 63
End: 2018-06-12

## 2018-06-14 ENCOUNTER — HOSPITAL ENCOUNTER (OUTPATIENT)
Dept: RADIOLOGY | Facility: HOSPITAL | Age: 63
Discharge: HOME | End: 2018-06-14
Attending: ORTHOPAEDIC SURGERY
Payer: COMMERCIAL

## 2018-06-14 ENCOUNTER — OFFICE VISIT (OUTPATIENT)
Dept: SPORTS MEDICINE | Facility: CLINIC | Age: 63
End: 2018-06-14
Payer: COMMERCIAL

## 2018-06-14 VITALS
SYSTOLIC BLOOD PRESSURE: 153 MMHG | HEART RATE: 77 BPM | WEIGHT: 266.44 LBS | DIASTOLIC BLOOD PRESSURE: 92 MMHG | BODY MASS INDEX: 35.31 KG/M2 | HEIGHT: 73 IN

## 2018-06-14 DIAGNOSIS — M25.562 LEFT KNEE PAIN, UNSPECIFIED CHRONICITY: Primary | ICD-10-CM

## 2018-06-14 DIAGNOSIS — M25.562 LEFT KNEE PAIN, UNSPECIFIED CHRONICITY: ICD-10-CM

## 2018-06-14 PROCEDURE — 73564 X-RAY EXAM KNEE 4 OR MORE: CPT | Mod: 26,50,, | Performed by: RADIOLOGY

## 2018-06-14 PROCEDURE — 99203 OFFICE O/P NEW LOW 30 MIN: CPT | Mod: S$GLB,,, | Performed by: ORTHOPAEDIC SURGERY

## 2018-06-14 PROCEDURE — 73564 X-RAY EXAM KNEE 4 OR MORE: CPT | Mod: TC,50,FY,PO

## 2018-06-14 PROCEDURE — 99999 PR PBB SHADOW E&M-EST. PATIENT-LVL III: CPT | Mod: PBBFAC,,, | Performed by: ORTHOPAEDIC SURGERY
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
## 2018-06-20 ENCOUNTER — HOSPITAL ENCOUNTER (OUTPATIENT)
Dept: RADIOLOGY | Facility: HOSPITAL | Age: 63
Discharge: HOME | End: 2018-06-20
Attending: ORTHOPAEDIC SURGERY
Payer: COMMERCIAL

## 2018-06-20 DIAGNOSIS — M25.562 LEFT KNEE PAIN, UNSPECIFIED CHRONICITY: ICD-10-CM

## 2018-06-20 PROCEDURE — 73721 MRI JNT OF LWR EXTRE W/O DYE: CPT | Mod: 26,LT,, | Performed by: RADIOLOGY

## 2018-06-20 PROCEDURE — 73721 MRI JNT OF LWR EXTRE W/O DYE: CPT | Mod: TC,LT

## 2018-06-26 ENCOUNTER — OFFICE VISIT (OUTPATIENT)
Dept: SPORTS MEDICINE | Facility: CLINIC | Age: 63
End: 2018-06-26
Payer: COMMERCIAL

## 2018-06-26 VITALS
SYSTOLIC BLOOD PRESSURE: 160 MMHG | HEIGHT: 73 IN | WEIGHT: 266 LBS | BODY MASS INDEX: 35.25 KG/M2 | DIASTOLIC BLOOD PRESSURE: 101 MMHG

## 2018-06-26 DIAGNOSIS — M25.562 LEFT KNEE PAIN, UNSPECIFIED CHRONICITY: Primary | ICD-10-CM

## 2018-06-26 PROCEDURE — 99214 OFFICE O/P EST MOD 30 MIN: CPT | Mod: S$GLB,,, | Performed by: ORTHOPAEDIC SURGERY

## 2018-06-26 PROCEDURE — 99999 PR PBB SHADOW E&M-EST. PATIENT-LVL II: CPT | Mod: PBBFAC,,, | Performed by: ORTHOPAEDIC SURGERY
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
## 2018-06-27 DIAGNOSIS — M23.204 OLD TEAR OF MEDIAL MENISCUS OF LEFT KNEE, UNSPECIFIED TEAR TYPE: Primary | ICD-10-CM

## 2018-08-03 ENCOUNTER — TELEPHONE (OUTPATIENT)
Dept: SPORTS MEDICINE | Facility: CLINIC | Age: 63
End: 2018-08-03

## 2018-08-03 DIAGNOSIS — G89.29 CHRONIC PAIN OF LEFT KNEE: Primary | ICD-10-CM

## 2018-08-03 DIAGNOSIS — M25.562 CHRONIC PAIN OF LEFT KNEE: Primary | ICD-10-CM

## 2018-08-03 NOTE — TELEPHONE ENCOUNTER
Left knee 8/23/18    ----- Message from Frannie Franco MA sent at 8/3/2018 10:18 AM CDT -----  Patient will do PT at [place]    Pre op - 08/16/18     Date of surgery - 08/22/18

## 2018-08-10 ENCOUNTER — OFFICE VISIT (OUTPATIENT)
Dept: FAMILY MEDICINE | Facility: CLINIC | Age: 63
End: 2018-08-10
Payer: COMMERCIAL

## 2018-08-10 ENCOUNTER — LAB VISIT (OUTPATIENT)
Dept: LAB | Facility: HOSPITAL | Age: 63
End: 2018-08-10
Attending: INTERNAL MEDICINE
Payer: COMMERCIAL

## 2018-08-10 VITALS
SYSTOLIC BLOOD PRESSURE: 122 MMHG | HEART RATE: 72 BPM | HEIGHT: 73 IN | DIASTOLIC BLOOD PRESSURE: 70 MMHG | WEIGHT: 273.94 LBS | BODY MASS INDEX: 36.31 KG/M2

## 2018-08-10 DIAGNOSIS — R73.03 PREDIABETES: ICD-10-CM

## 2018-08-10 DIAGNOSIS — I10 ESSENTIAL HYPERTENSION: ICD-10-CM

## 2018-08-10 DIAGNOSIS — S01.81XD FACIAL LACERATION, SUBSEQUENT ENCOUNTER: ICD-10-CM

## 2018-08-10 DIAGNOSIS — E78.2 MIXED HYPERLIPIDEMIA: ICD-10-CM

## 2018-08-10 DIAGNOSIS — F41.9 CHRONIC ANXIETY: ICD-10-CM

## 2018-08-10 DIAGNOSIS — Z01.818 PREOPERATIVE EXAMINATION: ICD-10-CM

## 2018-08-10 DIAGNOSIS — I10 ESSENTIAL HYPERTENSION: Primary | ICD-10-CM

## 2018-08-10 DIAGNOSIS — E29.1 HYPOGONADISM IN MALE: ICD-10-CM

## 2018-08-10 DIAGNOSIS — Z00.00 PREVENTATIVE HEALTH CARE: ICD-10-CM

## 2018-08-10 DIAGNOSIS — K21.9 GASTROESOPHAGEAL REFLUX DISEASE, ESOPHAGITIS PRESENCE NOT SPECIFIED: ICD-10-CM

## 2018-08-10 LAB
ALBUMIN SERPL BCP-MCNC: 3.9 G/DL
ALP SERPL-CCNC: 103 U/L
ALT SERPL W/O P-5'-P-CCNC: 39 U/L
ANION GAP SERPL CALC-SCNC: 12 MMOL/L
APTT BLDCRRT: 24.7 SEC
AST SERPL-CCNC: 30 U/L
BASOPHILS # BLD AUTO: 0.09 K/UL
BASOPHILS NFR BLD: 1.2 %
BILIRUB SERPL-MCNC: 0.6 MG/DL
BUN SERPL-MCNC: 20 MG/DL
CALCIUM SERPL-MCNC: 10 MG/DL
CHLORIDE SERPL-SCNC: 99 MMOL/L
CHOLEST SERPL-MCNC: 207 MG/DL
CHOLEST/HDLC SERPL: 4.5 {RATIO}
CO2 SERPL-SCNC: 29 MMOL/L
CREAT SERPL-MCNC: 1 MG/DL
DIFFERENTIAL METHOD: ABNORMAL
EOSINOPHIL # BLD AUTO: 0.2 K/UL
EOSINOPHIL NFR BLD: 2.1 %
ERYTHROCYTE [DISTWIDTH] IN BLOOD BY AUTOMATED COUNT: 12.7 %
EST. GFR  (AFRICAN AMERICAN): >60 ML/MIN/1.73 M^2
EST. GFR  (NON AFRICAN AMERICAN): >60 ML/MIN/1.73 M^2
ESTIMATED AVG GLUCOSE: 120 MG/DL
GLUCOSE SERPL-MCNC: 99 MG/DL
HBA1C MFR BLD HPLC: 5.8 %
HCT VFR BLD AUTO: 46.3 %
HDLC SERPL-MCNC: 46 MG/DL
HDLC SERPL: 22.2 %
HGB BLD-MCNC: 14.7 G/DL
IMM GRANULOCYTES # BLD AUTO: 0.01 K/UL
IMM GRANULOCYTES NFR BLD AUTO: 0.1 %
INR PPP: 1
LDLC SERPL CALC-MCNC: 114.6 MG/DL
LYMPHOCYTES # BLD AUTO: 2.9 K/UL
LYMPHOCYTES NFR BLD: 38.9 %
MCH RBC QN AUTO: 30.3 PG
MCHC RBC AUTO-ENTMCNC: 31.7 G/DL
MCV RBC AUTO: 96 FL
MONOCYTES # BLD AUTO: 0.5 K/UL
MONOCYTES NFR BLD: 7.2 %
NEUTROPHILS # BLD AUTO: 3.8 K/UL
NEUTROPHILS NFR BLD: 50.5 %
NONHDLC SERPL-MCNC: 161 MG/DL
NRBC BLD-RTO: 0 /100 WBC
PLATELET # BLD AUTO: 285 K/UL
PMV BLD AUTO: 10.3 FL
POTASSIUM SERPL-SCNC: 4.6 MMOL/L
PROT SERPL-MCNC: 7.4 G/DL
PROTHROMBIN TIME: 10.6 SEC
RBC # BLD AUTO: 4.85 M/UL
SODIUM SERPL-SCNC: 140 MMOL/L
TESTOST SERPL-MCNC: 308 NG/DL
TRIGL SERPL-MCNC: 232 MG/DL
WBC # BLD AUTO: 7.45 K/UL

## 2018-08-10 PROCEDURE — 99214 OFFICE O/P EST MOD 30 MIN: CPT | Mod: 25,S$GLB,, | Performed by: INTERNAL MEDICINE

## 2018-08-10 PROCEDURE — 90670 PCV13 VACCINE IM: CPT | Mod: S$GLB,,, | Performed by: INTERNAL MEDICINE

## 2018-08-10 PROCEDURE — 80061 LIPID PANEL: CPT

## 2018-08-10 PROCEDURE — 80053 COMPREHEN METABOLIC PANEL: CPT

## 2018-08-10 PROCEDURE — 84403 ASSAY OF TOTAL TESTOSTERONE: CPT

## 2018-08-10 PROCEDURE — 36415 COLL VENOUS BLD VENIPUNCTURE: CPT | Mod: PN

## 2018-08-10 PROCEDURE — 85610 PROTHROMBIN TIME: CPT

## 2018-08-10 PROCEDURE — 99999 PR PBB SHADOW E&M-EST. PATIENT-LVL III: CPT | Mod: PBBFAC,,, | Performed by: INTERNAL MEDICINE

## 2018-08-10 PROCEDURE — 90471 IMMUNIZATION ADMIN: CPT | Mod: S$GLB,,, | Performed by: INTERNAL MEDICINE

## 2018-08-10 PROCEDURE — 85025 COMPLETE CBC W/AUTO DIFF WBC: CPT

## 2018-08-10 PROCEDURE — 83036 HEMOGLOBIN GLYCOSYLATED A1C: CPT

## 2018-08-10 PROCEDURE — 85730 THROMBOPLASTIN TIME PARTIAL: CPT

## 2018-08-10 NOTE — PROGRESS NOTES
Assessment and Plan:    1. Essential hypertension  Controlled on current medications. Will continue. Update labs.   - Comprehensive metabolic panel; Future    2. Mixed hyperlipidemia  Doing well on statin, follow up labs.   - Lipid panel; Future    3. Prediabetes  Will recheck A1c.   - Hemoglobin A1c; Future    4. Chronic anxiety  Doing well, continue sertraline.     5. Hypogonadism in male  Followed by Urology, patient requested follow up testosterone today.   - CBC auto differential; Future  - Testosterone; Future    6. Gastroesophageal reflux disease, esophagitis presence not specified  Not taking PPI currently and not having symptoms, will discontinue this medication for now. continue with dietary changes.     7. Facial laceration, subsequent encounter  Sutures removed without complication.     8. Preventative health care  - Pneumococcal Conjugate Vaccine (13 Valent) (IM)    9. Preoperative examination  RCRI risk factors include: no known RCRI risk factors. As such, per RCRI the risk of cardiac death, nonfatal myocardial infarction, or nonfatal cardiac arrest is 0.4% and the risk of myocardial infarction, pulmonary edema, ventricular fibrillation, primary cardiac arrest, or complete heart block is 0.5%.  Overall this patient can be considered low risk for this intermediate risk procedure. No further cardiac testing is recommended at this time.     Patient denies any symptoms (as per HPI) concerning for undiagnosed lung disease including THU. Would not recommend obtaining chest X-ray, sleep study, or PFTs at this time. Patient is a non-smoker. We discussed the benefits of early mobilization and deep breathing after surgery.      Screened patient for alcohol misuse, use of illicit drugs, and personal or family history of anesthetic complications or bleeding diathesis and no substantial concerns were identified.     All current medications were reviewed and at this time no changes to medications are recommended  prior to surgery.     I recommend use of standard pre-op and post-op precautions for this patient. In my opinion, he is medically optimized for this procedure, and can proceed without further evaluation.   - CBC auto differential; Future  - Protime-INR; Future  - APTT; Future    ______________________________________________________________________  Subjective:    Chief Complaint:  Follow up chronic medical conditions, ER follow up    HPI:  Nilesh is a 63 y.o. year old man here for follow up of chronic medical conditions.     ER follow up- Seen on 7/31 for fall after tripping in a dark garage with laceration above R eye. 6 sutures placed total to close laceration.     HTN- Takes lisinopril, HCTZ, and metoprolol for BP. Has historically been at goal on this. No problems with the medications, BP has been controlled.     HLD- Takes atorvastatin, no problems with this.    GERD- Has stopped taking the PPI since he changed his diet.     Anxiety- Takes sertraline, doing well on this.     He has been seeing Ortho for knee pain, planning scope later this month. He needs pre-operative evaluation prior to this procedure.     He has no known personal history of cardiovascular disease (no CAD, PAD, or strokes). He has no history of heart failure, diabetes or chronic kidney disease. He denies symptoms of angina, syncope, dyspnea or palpitations. He is able to walk up 2 flights of stairs without chest pain or shortness of breath.     He denies exercise intolerance, unexplained dyspnea, or cough. He has no known history of obstructive sleep apnea and denies snoring, unusual daytime fatigue, or witnessed apneas.     He sees urology for low testosterone and has been taking the topical testosterone.     Medications:  Current Outpatient Prescriptions on File Prior to Visit   Medication Sig Dispense Refill    atorvastatin (LIPITOR) 40 MG tablet Take 1 tablet (40 mg total) by mouth nightly. 90 tablet 2    fexofenadine (ALLEGRA) 180 MG  "tablet Take 1 tablet (180 mg total) by mouth once daily. 90 tablet 1    lisinopril-hydrochlorothiazide (PRINZIDE,ZESTORETIC) 20-25 mg Tab Take 1 tablet by mouth once daily. 90 tablet 2    metoprolol succinate (TOPROL-XL) 50 MG 24 hr tablet Take 1 tablet (50 mg total) by mouth every evening. 90 tablet 2    pantoprazole (PROTONIX) 40 MG tablet Take 1 tablet (40 mg total) by mouth once daily. 90 tablet 2    sertraline (ZOLOFT) 100 MG tablet Take 1 tablet (100 mg total) by mouth once daily. 90 tablet 2    testosterone (ANDROGEL) 20.25 mg/1.25 gram (1.62 %) GlPm Apply 2 pumps to shoulders daily 1 Bottle 5     No current facility-administered medications on file prior to visit.        Review of Systems:  Review of Systems   Constitutional: Negative for activity change and unexpected weight change.   HENT: Negative for hearing loss, rhinorrhea and trouble swallowing.    Eyes: Negative for discharge and visual disturbance.   Respiratory: Negative for chest tightness and wheezing.    Cardiovascular: Negative for chest pain and palpitations.   Gastrointestinal: Negative for blood in stool, constipation, diarrhea and vomiting.   Endocrine: Negative for polydipsia and polyuria.   Genitourinary: Negative for difficulty urinating, hematuria and urgency.   Musculoskeletal: Positive for arthralgias. Negative for joint swelling and neck pain.   Neurological: Negative for weakness and headaches.   Psychiatric/Behavioral: Negative for confusion and dysphoric mood.       Past Medical History:  Past Medical History:   Diagnosis Date    Allergic rhinosinusitis     Anxiety     Back pain     with spasms    Diverticulitis     s/p partial colectomy    GERD (gastroesophageal reflux disease)     Hyperlipidemia     Hypertension        Objective:    Vitals:  Vitals:    08/10/18 1038   BP: 122/70   Pulse: 72   Weight: 124.2 kg (273 lb 14.7 oz)   Height: 6' 1" (1.854 m)   PainSc: 0-No pain       Physical Exam   Constitutional: He is " oriented to person, place, and time. He appears well-developed and well-nourished. No distress.   HENT:   Mouth/Throat: Oropharynx is clear and moist.   Eyes: No scleral icterus.   Cardiovascular: Normal rate and regular rhythm.    No murmur heard.  Pulmonary/Chest: Effort normal and breath sounds normal. No respiratory distress. He has no wheezes.   Musculoskeletal: He exhibits no edema.   Neurological: He is alert and oriented to person, place, and time.   Skin: Skin is warm and dry.   Psychiatric: He has a normal mood and affect. His behavior is normal.   Vitals reviewed.      Data:  Previous labs reviewed and pertinent for A1c 5.9.      Flavia Lange MD  Internal Medicine

## 2018-08-16 ENCOUNTER — ANESTHESIA EVENT (OUTPATIENT)
Dept: SURGERY | Facility: OTHER | Age: 63
End: 2018-08-16
Payer: COMMERCIAL

## 2018-08-16 ENCOUNTER — HOSPITAL ENCOUNTER (OUTPATIENT)
Dept: PREADMISSION TESTING | Facility: OTHER | Age: 63
Discharge: HOME | End: 2018-08-16
Attending: ORTHOPAEDIC SURGERY
Payer: COMMERCIAL

## 2018-08-16 ENCOUNTER — OFFICE VISIT (OUTPATIENT)
Dept: SPORTS MEDICINE | Facility: CLINIC | Age: 63
End: 2018-08-16
Payer: COMMERCIAL

## 2018-08-16 VITALS
SYSTOLIC BLOOD PRESSURE: 156 MMHG | HEART RATE: 62 BPM | HEIGHT: 73 IN | WEIGHT: 273 LBS | DIASTOLIC BLOOD PRESSURE: 95 MMHG | BODY MASS INDEX: 36.18 KG/M2

## 2018-08-16 VITALS
TEMPERATURE: 98 F | SYSTOLIC BLOOD PRESSURE: 140 MMHG | BODY MASS INDEX: 36.18 KG/M2 | DIASTOLIC BLOOD PRESSURE: 79 MMHG | HEIGHT: 73 IN | OXYGEN SATURATION: 97 % | WEIGHT: 273 LBS | HEART RATE: 63 BPM

## 2018-08-16 DIAGNOSIS — M23.204 OLD TEAR OF MEDIAL MENISCUS OF LEFT KNEE, UNSPECIFIED TEAR TYPE: Primary | ICD-10-CM

## 2018-08-16 PROCEDURE — 99499 UNLISTED E&M SERVICE: CPT | Mod: S$GLB,,, | Performed by: PHYSICIAN ASSISTANT

## 2018-08-16 PROCEDURE — 99999 PR PBB SHADOW E&M-EST. PATIENT-LVL III: CPT | Mod: PBBFAC,,, | Performed by: PHYSICIAN ASSISTANT

## 2018-08-16 RX ORDER — ASPIRIN 325 MG
325 TABLET, DELAYED RELEASE (ENTERIC COATED) ORAL 2 TIMES DAILY
Qty: 42 TABLET | Refills: 0 | Status: SHIPPED | OUTPATIENT
Start: 2018-08-16 | End: 2018-10-18

## 2018-08-16 RX ORDER — TRAMADOL HYDROCHLORIDE 50 MG/1
50 TABLET ORAL EVERY 6 HOURS PRN
Qty: 40 TABLET | Refills: 0 | Status: SHIPPED | OUTPATIENT
Start: 2018-08-16 | End: 2018-10-18

## 2018-08-16 RX ORDER — PREGABALIN 75 MG/1
150 CAPSULE ORAL
Status: ACTIVE | OUTPATIENT
Start: 2018-08-16 | End: 2018-08-16

## 2018-08-16 RX ORDER — MIDAZOLAM HYDROCHLORIDE 1 MG/ML
2 INJECTION INTRAMUSCULAR; INTRAVENOUS
Status: CANCELLED | OUTPATIENT
Start: 2018-08-16 | End: 2018-08-16

## 2018-08-16 RX ORDER — PROMETHAZINE HYDROCHLORIDE 25 MG/1
25 TABLET ORAL EVERY 6 HOURS PRN
Qty: 40 TABLET | Refills: 0 | Status: SHIPPED | OUTPATIENT
Start: 2018-08-16 | End: 2018-10-18

## 2018-08-16 RX ORDER — MUPIROCIN 20 MG/G
OINTMENT TOPICAL
Status: CANCELLED | OUTPATIENT
Start: 2018-08-16

## 2018-08-16 RX ORDER — SODIUM CHLORIDE, SODIUM LACTATE, POTASSIUM CHLORIDE, CALCIUM CHLORIDE 600; 310; 30; 20 MG/100ML; MG/100ML; MG/100ML; MG/100ML
INJECTION, SOLUTION INTRAVENOUS CONTINUOUS
Status: CANCELLED | OUTPATIENT
Start: 2018-08-16

## 2018-08-16 RX ORDER — HYDROCODONE BITARTRATE AND ACETAMINOPHEN 10; 325 MG/1; MG/1
1 TABLET ORAL EVERY 6 HOURS PRN
Qty: 28 TABLET | Refills: 0 | Status: SHIPPED | OUTPATIENT
Start: 2018-08-16 | End: 2018-10-18

## 2018-08-16 RX ORDER — LIDOCAINE HYDROCHLORIDE 10 MG/ML
0.5 INJECTION, SOLUTION EPIDURAL; INFILTRATION; INTRACAUDAL; PERINEURAL ONCE
Status: CANCELLED | OUTPATIENT
Start: 2018-08-16 | End: 2018-08-16

## 2018-08-16 NOTE — H&P (VIEW-ONLY)
"CC:  left knee pain     HPI:    PLAN OF ACTION: Nilesh Sanchez is here for a completion of his perioperative paperwork. he Is scheduled to undergo left knee meniscectomy on 8/22/18.  He is a healthy individual and does need clearance for this procedure. Per Dr. Lange, "Overall this patient can be considered low risk for this intermediate risk procedure. No further cardiac testing is recommended at this time."    Risks, indications and benefits of the surgical procedure were discussed with the patient. All questions with regard to surgery, rehab, expected return to functional activities, activities of daily living and recreational endeavors were answered to his satisfaction.    Review of patient's allergies indicates:   Allergen Reactions    Flonase [fluticasone] Other (See Comments)     inflammation , sx seemed to worsen over time rather than improve       Past Medical History:   Diagnosis Date    Allergic rhinosinusitis     Anxiety     Back pain     with spasms    Diverticulitis     s/p partial colectomy    GERD (gastroesophageal reflux disease)     Hyperlipidemia     Hypertension        Past Surgical History:   Procedure Laterality Date    COLECTOMY      18in of sigmoid colon removed    COLONOSCOPY      ESOPHAGOGASTRODUODENOSCOPY      LUMBAR DISC SURGERY  12/07/2017    L4/5    UPPER GASTROINTESTINAL ENDOSCOPY         Social History     Socioeconomic History    Marital status:      Spouse name: Not on file    Number of children: Not on file    Years of education: Not on file    Highest education level: Not on file   Social Needs    Financial resource strain: Not on file    Food insecurity - worry: Not on file    Food insecurity - inability: Not on file    Transportation needs - medical: Not on file    Transportation needs - non-medical: Not on file   Occupational History    Not on file   Tobacco Use    Smoking status: Never Smoker    Smokeless tobacco: Never Used   Substance and " Sexual Activity    Alcohol use: Yes     Alcohol/week: 9.6 - 10.8 oz     Types: 2 Glasses of wine, 14 - 16 Standard drinks or equivalent per week     Comment: wine daily    Drug use: No    Sexual activity: Yes     Partners: Female   Other Topics Concern    Not on file   Social History Narrative    Owns company making leather belts.        Family History   Problem Relation Age of Onset    Heart disease Mother     Heart disease Father     Heart disease Sister     Atrial fibrillation Sister     Heart disease Brother          Current Outpatient Medications:     atorvastatin (LIPITOR) 40 MG tablet, Take 1 tablet (40 mg total) by mouth nightly., Disp: 90 tablet, Rfl: 2    fexofenadine (ALLEGRA) 180 MG tablet, Take 1 tablet (180 mg total) by mouth once daily., Disp: 90 tablet, Rfl: 1    lisinopril-hydrochlorothiazide (PRINZIDE,ZESTORETIC) 20-25 mg Tab, Take 1 tablet by mouth once daily., Disp: 90 tablet, Rfl: 2    metoprolol succinate (TOPROL-XL) 50 MG 24 hr tablet, Take 1 tablet (50 mg total) by mouth every evening., Disp: 90 tablet, Rfl: 2    pantoprazole (PROTONIX) 40 MG tablet, Take 1 tablet (40 mg total) by mouth once daily., Disp: 90 tablet, Rfl: 2    sertraline (ZOLOFT) 100 MG tablet, Take 1 tablet (100 mg total) by mouth once daily., Disp: 90 tablet, Rfl: 2    testosterone (ANDROGEL) 20.25 mg/1.25 gram (1.62 %) GlPm, Apply 2 pumps to shoulders daily, Disp: 1 Bottle, Rfl: 5  No current facility-administered medications for this visit.     Facility-Administered Medications Ordered in Other Visits:     pregabalin capsule 150 mg, 150 mg, Oral, Once Pre-Op, Akhil Abernathy MD    Please see patient's chart for applicable emotional/behavioral/social status.    REVIEW OF SYSTEMS:  Constitution: Negative. Negative for chills, fever and night sweats.   HENT: Negative for congestion and headaches.    Eyes: Negative for blurred vision, left vision loss and right vision loss.   Cardiovascular: Negative for chest  pain and syncope.   Respiratory: Negative for cough and shortness of breath.    Endocrine: Negative for polydipsia, polyphagia and polyuria.   Hematologic/Lymphatic: Negative for bleeding problem. Does not bruise/bleed easily.   Skin: Negative for dry skin, itching and rash.   Musculoskeletal: Negative for falls. Positive for left knee pain and muscle weakness.   Gastrointestinal: Negative for abdominal pain and bowel incontinence.   Genitourinary: Negative for bladder incontinence and nocturia.   Neurological: Negative for disturbances in coordination, loss of balance and seizures.   Psychiatric/Behavioral: Negative for depression. The patient does not have insomnia.    Allergic/Immunologic: Negative for hives and persistent infections.     Once no other questions were asked, a brief history and physical exam was then performed.    PHYSICAL EXAM:  GEN: A&Ox3, WD WN NAD  HEENT: WNL  CHEST: CTAB, no W/R/R  HEART: RRR, no M/R/G  ABD: Soft, NT ND, BS x4 QUADS  MS; See Epic  NEURO: CN II-XII intact       The surgical consent was then reviewed with the patient, who agreed with all the contents of the consent form and it was signed. he was then given the [facility] surgery packet to bring with him to [facility] for the anesthesia portion of his perioperative paperwork.     PHYSICAL THERAPY:  He was also instructed regarding physical therapy and will begin on  POD#1. He was given a copy of the original prescription to schedule. Another copy of this prescription was also faxed to PT in [place].    POST OP CARE:instructions were reviewed including care of the wound and dressing after surgery and when he can shower.     PAIN MANAGEMENT: Nilesh Sanchez was also given his pain management regime, which includes the TENS unit given to him by Jovanny Russo along with the education required for its use. He was also instructed regarding the Polar ice unit that will be in place after surgery and his postoperative pain medications.      MEDICATION:  Norco 10/325mg 1 po q 4-6 hours prn pain  Ultram 50 mg one p.o. q.4-6 hours p.r.n. breakthrough pain,   Phenergan 25 mg one p.o. q.4-6 hours p.r.n. nausea and vomiting.  Colace 100mg BID PRN constipation  EC ASA 325mg BID x 3 weeks  Prilosec OTC    Patient was educated on the signs and symptoms of DVTs as well as the risk of their occurrence.     IMPRESSION: surgical candidate for left knee meniscectomy    CONCLUSION: Pre-operative information reviewed with patient and they have voiced their understanding and signed consent. Proceed with surgery as planned.    Dr. Bansal was present in the office at the time of pre op appointment and available for questions if the patient had any for him. As there were no other questions to be asked, he was given my business card along with Buster Bansal MD business card if he has any questions or concerns prior to surgery or in the postop period.

## 2018-08-16 NOTE — ANESTHESIA PREPROCEDURE EVALUATION
08/16/2018  Nilesh Sanchez is a 63 y.o., male.    Anesthesia Evaluation    I have reviewed the Patient Summary Reports.    I have reviewed the Nursing Notes.   I have reviewed the Medications.     Review of Systems  Anesthesia Hx:  No problems with previous Anesthesia  History of prior surgery of interest to airway management or planning: Denies Family Hx of Anesthesia complications.   Denies Personal Hx of Anesthesia complications.   Social:  Non-Smoker    Hematology/Oncology:  Hematology Normal   Oncology Normal     EENT/Dental:EENT/Dental Normal   Cardiovascular:   Exercise tolerance: good Hypertension, well controlled Old EKG Inf infarct,angio WNL   Pulmonary:  Pulmonary Normal    Renal/:  Renal/ Normal     Hepatic/GI:   GERD, well controlled    Musculoskeletal:  Musculoskeletal Normal    Endocrine:  Endocrine Normal    Dermatological:  Skin Normal    Psych:  Psychiatric Normal           Physical Exam  General:  Obesity    Airway/Jaw/Neck:  Airway Findings: Mouth Opening: Normal Tongue: Normal  Mallampati: II      Dental:  Dental Findings: molar caps, In tact        Mental Status:  Mental Status Findings:  Cooperative, Alert and Oriented         Anesthesia Plan  Type of Anesthesia, risks & benefits discussed:  Anesthesia Type:  general  Patient's Preference:   Intra-op Monitoring Plan:   Intra-op Monitoring Plan Comments:   Post Op Pain Control Plan: multimodal analgesia  Post Op Pain Control Plan Comments:   Induction:   IV  Beta Blocker:         Informed Consent: Patient understands risks and agrees with Anesthesia plan.  Questions answered. Anesthesia consent signed with patient.  ASA Score: 3     Day of Surgery Review of History & Physical:    H&P update referred to the surgeon.     Anesthesia Plan Notes: Labs in epic        Ready For Surgery From Anesthesia Perspective.

## 2018-08-16 NOTE — H&P
"CC:  left knee pain     HPI:    PLAN OF ACTION: Nilesh Sanchez is here for a completion of his perioperative paperwork. he Is scheduled to undergo left knee meniscectomy on 8/22/18.  He is a healthy individual and does need clearance for this procedure. Per Dr. Lange, "Overall this patient can be considered low risk for this intermediate risk procedure. No further cardiac testing is recommended at this time."    Risks, indications and benefits of the surgical procedure were discussed with the patient. All questions with regard to surgery, rehab, expected return to functional activities, activities of daily living and recreational endeavors were answered to his satisfaction.    Review of patient's allergies indicates:   Allergen Reactions    Flonase [fluticasone] Other (See Comments)     inflammation , sx seemed to worsen over time rather than improve       Past Medical History:   Diagnosis Date    Allergic rhinosinusitis     Anxiety     Back pain     with spasms    Diverticulitis     s/p partial colectomy    GERD (gastroesophageal reflux disease)     Hyperlipidemia     Hypertension        Past Surgical History:   Procedure Laterality Date    COLECTOMY      18in of sigmoid colon removed    COLONOSCOPY      ESOPHAGOGASTRODUODENOSCOPY      LUMBAR DISC SURGERY  12/07/2017    L4/5    UPPER GASTROINTESTINAL ENDOSCOPY         Social History     Socioeconomic History    Marital status:      Spouse name: Not on file    Number of children: Not on file    Years of education: Not on file    Highest education level: Not on file   Social Needs    Financial resource strain: Not on file    Food insecurity - worry: Not on file    Food insecurity - inability: Not on file    Transportation needs - medical: Not on file    Transportation needs - non-medical: Not on file   Occupational History    Not on file   Tobacco Use    Smoking status: Never Smoker    Smokeless tobacco: Never Used   Substance and " Sexual Activity    Alcohol use: Yes     Alcohol/week: 9.6 - 10.8 oz     Types: 2 Glasses of wine, 14 - 16 Standard drinks or equivalent per week     Comment: wine daily    Drug use: No    Sexual activity: Yes     Partners: Female   Other Topics Concern    Not on file   Social History Narrative    Owns company making leather belts.        Family History   Problem Relation Age of Onset    Heart disease Mother     Heart disease Father     Heart disease Sister     Atrial fibrillation Sister     Heart disease Brother          Current Outpatient Medications:     atorvastatin (LIPITOR) 40 MG tablet, Take 1 tablet (40 mg total) by mouth nightly., Disp: 90 tablet, Rfl: 2    fexofenadine (ALLEGRA) 180 MG tablet, Take 1 tablet (180 mg total) by mouth once daily., Disp: 90 tablet, Rfl: 1    lisinopril-hydrochlorothiazide (PRINZIDE,ZESTORETIC) 20-25 mg Tab, Take 1 tablet by mouth once daily., Disp: 90 tablet, Rfl: 2    metoprolol succinate (TOPROL-XL) 50 MG 24 hr tablet, Take 1 tablet (50 mg total) by mouth every evening., Disp: 90 tablet, Rfl: 2    pantoprazole (PROTONIX) 40 MG tablet, Take 1 tablet (40 mg total) by mouth once daily., Disp: 90 tablet, Rfl: 2    sertraline (ZOLOFT) 100 MG tablet, Take 1 tablet (100 mg total) by mouth once daily., Disp: 90 tablet, Rfl: 2    testosterone (ANDROGEL) 20.25 mg/1.25 gram (1.62 %) GlPm, Apply 2 pumps to shoulders daily, Disp: 1 Bottle, Rfl: 5  No current facility-administered medications for this visit.     Facility-Administered Medications Ordered in Other Visits:     pregabalin capsule 150 mg, 150 mg, Oral, Once Pre-Op, Akhil Abernathy MD    Please see patient's chart for applicable emotional/behavioral/social status.    REVIEW OF SYSTEMS:  Constitution: Negative. Negative for chills, fever and night sweats.   HENT: Negative for congestion and headaches.    Eyes: Negative for blurred vision, left vision loss and right vision loss.   Cardiovascular: Negative for chest  pain and syncope.   Respiratory: Negative for cough and shortness of breath.    Endocrine: Negative for polydipsia, polyphagia and polyuria.   Hematologic/Lymphatic: Negative for bleeding problem. Does not bruise/bleed easily.   Skin: Negative for dry skin, itching and rash.   Musculoskeletal: Negative for falls. Positive for left knee pain and muscle weakness.   Gastrointestinal: Negative for abdominal pain and bowel incontinence.   Genitourinary: Negative for bladder incontinence and nocturia.   Neurological: Negative for disturbances in coordination, loss of balance and seizures.   Psychiatric/Behavioral: Negative for depression. The patient does not have insomnia.    Allergic/Immunologic: Negative for hives and persistent infections.     Once no other questions were asked, a brief history and physical exam was then performed.    PHYSICAL EXAM:  GEN: A&Ox3, WD WN NAD  HEENT: WNL  CHEST: CTAB, no W/R/R  HEART: RRR, no M/R/G  ABD: Soft, NT ND, BS x4 QUADS  MS; See Epic  NEURO: CN II-XII intact       The surgical consent was then reviewed with the patient, who agreed with all the contents of the consent form and it was signed. he was then given the [facility] surgery packet to bring with him to [facility] for the anesthesia portion of his perioperative paperwork.     PHYSICAL THERAPY:  He was also instructed regarding physical therapy and will begin on  POD#1. He was given a copy of the original prescription to schedule. Another copy of this prescription was also faxed to PT in [place].    POST OP CARE:instructions were reviewed including care of the wound and dressing after surgery and when he can shower.     PAIN MANAGEMENT: Nilesh Sanchez was also given his pain management regime, which includes the TENS unit given to him by Jovanny Russo along with the education required for its use. He was also instructed regarding the Polar ice unit that will be in place after surgery and his postoperative pain medications.      MEDICATION:  Norco 10/325mg 1 po q 4-6 hours prn pain  Ultram 50 mg one p.o. q.4-6 hours p.r.n. breakthrough pain,   Phenergan 25 mg one p.o. q.4-6 hours p.r.n. nausea and vomiting.  Colace 100mg BID PRN constipation  EC ASA 325mg BID x 3 weeks  Prilosec OTC    Patient was educated on the signs and symptoms of DVTs as well as the risk of their occurrence.     IMPRESSION: surgical candidate for left knee meniscectomy    CONCLUSION: Pre-operative information reviewed with patient and they have voiced their understanding and signed consent. Proceed with surgery as planned.    Dr. Bansal was present in the office at the time of pre op appointment and available for questions if the patient had any for him. As there were no other questions to be asked, he was given my business card along with Buster Bansal MD business card if he has any questions or concerns prior to surgery or in the postop period.

## 2018-08-16 NOTE — DISCHARGE INSTRUCTIONS
PRE-ADMIT TESTING -  [phone]    [address]  [place] [facility]          Your surgery has been scheduled at [facility]. We are pleased to have the opportunity to serve you. For Further Information please call [phone].    On the day of surgery please report to the Information Desk on the 1st floor.    · CONTACT YOUR PHYSICIAN'S OFFICE THE DAY PRIOR TO YOUR SURGERY TO OBTAIN YOUR ARRIVAL TIME.     · The evening before surgery do not eat anything after 9 p.m. ( this includes hard candy, chewing gum and mints).  You may only have GATORADE, POWERADE AND WATER  from 9 p.m. until you leave your home.   DO NOT DRINK ANY LIQUIDS ON THE WAY TO THE HOSPITAL.      SPECIAL MEDICATION INSTRUCTIONS: TAKE medications checked off by the Anesthesiologist on your Medication List.    Angiogram Patients: Take medications as instructed by your physician, including aspirin.     Surgery Patients:    If you take ASPIRIN - Your PHYSICIAN/SURGEON will need to inform you IF/OR when you need to stop taking aspirin prior to your surgery.     Do Not take any medications containing IBUPROFEN.  Do Not Wear any make-up or dark nail polish   (especially eye make-up) to surgery. If you come to surgery with makeup on you will be required to remove the makeup or nail polish.    Do not shave your surgical area at least 5 days prior to your surgery. The surgical prep will be performed at the hospital according to Infection Control regulations.    Leave all valuables at home.   Do Not wear any jewelry or watches, including any metal in body piercings.  Contact Lens must be removed before surgery. Either do not wear the contact lens or bring a case and solution for storage.  Please bring a container for eyeglasses or dentures as required.  Bring any paperwork your physician has provided, such as consent forms,  history and physicals, doctor's orders, etc.   Bring comfortable clothes that are loose fitting to wear upon  discharge. Take into consideration the type of surgery being performed.  Maintain your diet as advised per your physician the day prior to surgery.      Adequate rest the night before surgery is advised.   Park in the Parking lot behind the hospital or in the [place] [facility] across the street from the parking lot. Parking is complimentary.  If you will be discharged the same day as your procedure, please arrange for a responsible adult to drive you home or to accompany you if traveling by taxi.   YOU WILL NOT BE PERMITTED TO DRIVE OR TO LEAVE THE HOSPITAL ALONE AFTER SURGERY.   It is strongly recommended that you arrange for someone to remain with you for the first 24 hrs following your surgery.       Thank you for your cooperation.  The Staff of [facility].        Bathing Instructions                                                                 Please shower the evening before and morning of your procedure with    ANTIBACTERIAL SOAP. ( DIAL, etc )  Concentrate on the surgical area   for at least 3 minutes and rinse completely. Dry off as usual.   Do not use any deodorant, powder, body lotions, perfume, after shave or    cologne.

## 2018-08-21 ENCOUNTER — TELEPHONE (OUTPATIENT)
Dept: SPORTS MEDICINE | Facility: CLINIC | Age: 63
End: 2018-08-21

## 2018-08-21 NOTE — TELEPHONE ENCOUNTER
----- Message from Conor Mcgarry sent at 8/21/2018 10:42 AM CDT -----  Contact: self   Pt is requesting a call back regarding appt for procedure on tomorrow. Pt states he is getting on an airplane and would like a  Voicemessage left. Pt can be reached at [phone].

## 2018-08-22 ENCOUNTER — HOSPITAL ENCOUNTER (OUTPATIENT)
Facility: OTHER | Age: 63
Discharge: HOME | End: 2018-08-22
Attending: ORTHOPAEDIC SURGERY | Admitting: ORTHOPAEDIC SURGERY
Payer: COMMERCIAL

## 2018-08-22 ENCOUNTER — ANESTHESIA (OUTPATIENT)
Dept: SURGERY | Facility: OTHER | Age: 63
End: 2018-08-22
Payer: COMMERCIAL

## 2018-08-22 VITALS
RESPIRATION RATE: 18 BRPM | WEIGHT: 273 LBS | DIASTOLIC BLOOD PRESSURE: 83 MMHG | SYSTOLIC BLOOD PRESSURE: 146 MMHG | OXYGEN SATURATION: 96 % | HEIGHT: 73 IN | HEART RATE: 56 BPM | BODY MASS INDEX: 36.18 KG/M2 | TEMPERATURE: 98 F

## 2018-08-22 DIAGNOSIS — M23.204 OLD TEAR OF MEDIAL MENISCUS OF LEFT KNEE, UNSPECIFIED TEAR TYPE: ICD-10-CM

## 2018-08-22 PROCEDURE — 63600175 PHARM REV CODE 636 W HCPCS: Performed by: NURSE ANESTHETIST, CERTIFIED REGISTERED

## 2018-08-22 PROCEDURE — 63600175 PHARM REV CODE 636 W HCPCS: Performed by: PHYSICIAN ASSISTANT

## 2018-08-22 PROCEDURE — 71000033 HC RECOVERY, INTIAL HOUR: Performed by: ORTHOPAEDIC SURGERY

## 2018-08-22 PROCEDURE — 71000016 HC POSTOP RECOV ADDL HR: Performed by: ORTHOPAEDIC SURGERY

## 2018-08-22 PROCEDURE — 37000009 HC ANESTHESIA EA ADD 15 MINS: Performed by: ORTHOPAEDIC SURGERY

## 2018-08-22 PROCEDURE — 25000003 PHARM REV CODE 250: Performed by: NURSE ANESTHETIST, CERTIFIED REGISTERED

## 2018-08-22 PROCEDURE — 25000003 PHARM REV CODE 250: Performed by: PHYSICIAN ASSISTANT

## 2018-08-22 PROCEDURE — 63600175 PHARM REV CODE 636 W HCPCS: Performed by: ORTHOPAEDIC SURGERY

## 2018-08-22 PROCEDURE — 36000711: Performed by: ORTHOPAEDIC SURGERY

## 2018-08-22 PROCEDURE — 71000015 HC POSTOP RECOV 1ST HR: Performed by: ORTHOPAEDIC SURGERY

## 2018-08-22 PROCEDURE — 63600175 PHARM REV CODE 636 W HCPCS: Performed by: ANESTHESIOLOGY

## 2018-08-22 PROCEDURE — 27201423 OPTIME MED/SURG SUP & DEVICES STERILE SUPPLY: Performed by: ORTHOPAEDIC SURGERY

## 2018-08-22 PROCEDURE — 37000008 HC ANESTHESIA 1ST 15 MINUTES: Performed by: ORTHOPAEDIC SURGERY

## 2018-08-22 PROCEDURE — 36000710: Performed by: ORTHOPAEDIC SURGERY

## 2018-08-22 PROCEDURE — 25000003 PHARM REV CODE 250: Performed by: ORTHOPAEDIC SURGERY

## 2018-08-22 PROCEDURE — 25000003 PHARM REV CODE 250: Performed by: ANESTHESIOLOGY

## 2018-08-22 PROCEDURE — 29881 ARTHRS KNE SRG MNISECTMY M/L: CPT | Mod: LT,,, | Performed by: ORTHOPAEDIC SURGERY

## 2018-08-22 RX ORDER — PROPOFOL 10 MG/ML
VIAL (ML) INTRAVENOUS
Status: DISCONTINUED | OUTPATIENT
Start: 2018-08-22 | End: 2018-08-22

## 2018-08-22 RX ORDER — HYDROMORPHONE HYDROCHLORIDE 2 MG/ML
0.4 INJECTION, SOLUTION INTRAMUSCULAR; INTRAVENOUS; SUBCUTANEOUS EVERY 5 MIN PRN
Status: DISCONTINUED | OUTPATIENT
Start: 2018-08-22 | End: 2018-08-22 | Stop reason: HOSPADM

## 2018-08-22 RX ORDER — MUPIROCIN 20 MG/G
OINTMENT TOPICAL
Status: DISCONTINUED | OUTPATIENT
Start: 2018-08-22 | End: 2018-08-22 | Stop reason: HOSPADM

## 2018-08-22 RX ORDER — SODIUM CHLORIDE, SODIUM LACTATE, POTASSIUM CHLORIDE, CALCIUM CHLORIDE 600; 310; 30; 20 MG/100ML; MG/100ML; MG/100ML; MG/100ML
INJECTION, SOLUTION INTRAVENOUS CONTINUOUS
Status: DISCONTINUED | OUTPATIENT
Start: 2018-08-22 | End: 2018-08-22 | Stop reason: HOSPADM

## 2018-08-22 RX ORDER — SODIUM CHLORIDE 0.9 % (FLUSH) 0.9 %
3 SYRINGE (ML) INJECTION
Status: DISCONTINUED | OUTPATIENT
Start: 2018-08-22 | End: 2018-08-22 | Stop reason: HOSPADM

## 2018-08-22 RX ORDER — OXYCODONE HYDROCHLORIDE 5 MG/1
5 TABLET ORAL
Status: DISCONTINUED | OUTPATIENT
Start: 2018-08-22 | End: 2018-08-22 | Stop reason: HOSPADM

## 2018-08-22 RX ORDER — MEPERIDINE HYDROCHLORIDE 50 MG/ML
12.5 INJECTION INTRAMUSCULAR; INTRAVENOUS; SUBCUTANEOUS ONCE AS NEEDED
Status: DISCONTINUED | OUTPATIENT
Start: 2018-08-22 | End: 2018-08-22 | Stop reason: HOSPADM

## 2018-08-22 RX ORDER — ONDANSETRON 2 MG/ML
INJECTION INTRAMUSCULAR; INTRAVENOUS
Status: DISCONTINUED | OUTPATIENT
Start: 2018-08-22 | End: 2018-08-22

## 2018-08-22 RX ORDER — MIDAZOLAM HYDROCHLORIDE 1 MG/ML
2 INJECTION INTRAMUSCULAR; INTRAVENOUS
Status: COMPLETED | OUTPATIENT
Start: 2018-08-22 | End: 2018-08-22

## 2018-08-22 RX ORDER — LIDOCAINE HYDROCHLORIDE 10 MG/ML
0.5 INJECTION, SOLUTION EPIDURAL; INFILTRATION; INTRACAUDAL; PERINEURAL ONCE
Status: DISCONTINUED | OUTPATIENT
Start: 2018-08-22 | End: 2018-08-22 | Stop reason: HOSPADM

## 2018-08-22 RX ORDER — ONDANSETRON 2 MG/ML
4 INJECTION INTRAMUSCULAR; INTRAVENOUS DAILY PRN
Status: DISCONTINUED | OUTPATIENT
Start: 2018-08-22 | End: 2018-08-22 | Stop reason: HOSPADM

## 2018-08-22 RX ORDER — CEFAZOLIN SODIUM 2 G/50ML
2 SOLUTION INTRAVENOUS
Status: DISCONTINUED | OUTPATIENT
Start: 2018-08-22 | End: 2018-08-22 | Stop reason: HOSPADM

## 2018-08-22 RX ORDER — FENTANYL CITRATE 50 UG/ML
25 INJECTION, SOLUTION INTRAMUSCULAR; INTRAVENOUS EVERY 5 MIN PRN
Status: DISCONTINUED | OUTPATIENT
Start: 2018-08-22 | End: 2018-08-22 | Stop reason: HOSPADM

## 2018-08-22 RX ORDER — GLYCOPYRROLATE 0.2 MG/ML
INJECTION INTRAMUSCULAR; INTRAVENOUS
Status: DISCONTINUED | OUTPATIENT
Start: 2018-08-22 | End: 2018-08-22

## 2018-08-22 RX ORDER — EPINEPHRINE 1 MG/ML
INJECTION, SOLUTION INTRACARDIAC; INTRAMUSCULAR; INTRAVENOUS; SUBCUTANEOUS
Status: DISCONTINUED | OUTPATIENT
Start: 2018-08-22 | End: 2018-08-22 | Stop reason: HOSPADM

## 2018-08-22 RX ORDER — BUPIVACAINE HYDROCHLORIDE 2.5 MG/ML
INJECTION, SOLUTION EPIDURAL; INFILTRATION; INTRACAUDAL
Status: DISCONTINUED | OUTPATIENT
Start: 2018-08-22 | End: 2018-08-22 | Stop reason: HOSPADM

## 2018-08-22 RX ORDER — ROCURONIUM BROMIDE 10 MG/ML
INJECTION, SOLUTION INTRAVENOUS
Status: DISCONTINUED | OUTPATIENT
Start: 2018-08-22 | End: 2018-08-22

## 2018-08-22 RX ORDER — LIDOCAINE HCL/PF 100 MG/5ML
SYRINGE (ML) INTRAVENOUS
Status: DISCONTINUED | OUTPATIENT
Start: 2018-08-22 | End: 2018-08-22

## 2018-08-22 RX ORDER — FENTANYL CITRATE 50 UG/ML
INJECTION, SOLUTION INTRAMUSCULAR; INTRAVENOUS
Status: DISCONTINUED | OUTPATIENT
Start: 2018-08-22 | End: 2018-08-22

## 2018-08-22 RX ORDER — NEOSTIGMINE METHYLSULFATE 1 MG/ML
INJECTION, SOLUTION INTRAVENOUS
Status: DISCONTINUED | OUTPATIENT
Start: 2018-08-22 | End: 2018-08-22

## 2018-08-22 RX ADMIN — SODIUM CHLORIDE, SODIUM LACTATE, POTASSIUM CHLORIDE, AND CALCIUM CHLORIDE: 600; 310; 30; 20 INJECTION, SOLUTION INTRAVENOUS at 07:08

## 2018-08-22 RX ADMIN — LIDOCAINE HYDROCHLORIDE 100 MG: 20 INJECTION, SOLUTION INTRAVENOUS at 07:08

## 2018-08-22 RX ADMIN — GLYCOPYRROLATE 0.4 MG: 0.2 INJECTION, SOLUTION INTRAMUSCULAR; INTRAVENOUS at 08:08

## 2018-08-22 RX ADMIN — ONDANSETRON 4 MG: 2 INJECTION INTRAMUSCULAR; INTRAVENOUS at 09:08

## 2018-08-22 RX ADMIN — MUPIROCIN: 20 OINTMENT TOPICAL at 05:08

## 2018-08-22 RX ADMIN — CEFAZOLIN SODIUM 2 G: 2 SOLUTION INTRAVENOUS at 08:08

## 2018-08-22 RX ADMIN — MIDAZOLAM HYDROCHLORIDE 2 MG: 1 INJECTION, SOLUTION INTRAMUSCULAR; INTRAVENOUS at 05:08

## 2018-08-22 RX ADMIN — GLYCOPYRROLATE 0.8 MG: 0.2 INJECTION, SOLUTION INTRAMUSCULAR; INTRAVENOUS at 09:08

## 2018-08-22 RX ADMIN — FENTANYL CITRATE 50 MCG: 50 INJECTION, SOLUTION INTRAMUSCULAR; INTRAVENOUS at 07:08

## 2018-08-22 RX ADMIN — PROPOFOL 200 MG: 10 INJECTION, EMULSION INTRAVENOUS at 07:08

## 2018-08-22 RX ADMIN — FENTANYL CITRATE 50 MCG: 50 INJECTION, SOLUTION INTRAMUSCULAR; INTRAVENOUS at 09:08

## 2018-08-22 RX ADMIN — NEOSTIGMINE METHYLSULFATE 5 MG: 1 INJECTION INTRAVENOUS at 09:08

## 2018-08-22 RX ADMIN — ROCURONIUM BROMIDE 40 MG: 10 INJECTION, SOLUTION INTRAVENOUS at 07:08

## 2018-08-22 NOTE — TRANSFER OF CARE
"Anesthesia Transfer of Care Note    Patient: Nilesh Sanchez    Procedure(s) Performed: Procedure(s) (LRB):  ARTHROSCOPY, KNEE, WITH MENISCECTOMY PARTIAL MEDIAL (Left)  ARTHROSCOPY, KNEE, WITH CHONDROPLASTY (Left)    Anesthesia PACU Handoff    Last vitals:   Visit Vitals  BP (!) 148/83 (BP Location: Right arm, Patient Position: Lying)   Pulse 74   Temp 36.4 °C (97.6 °F) (Oral)   Resp 18   Ht 6' 1" (1.854 m)   Wt 123.8 kg (273 lb)   SpO2 95%   BMI 36.02 kg/m²     "

## 2018-08-22 NOTE — BRIEF OP NOTE
[facility]  Brief Operative Note     SUMMARY     Surgery Date: 8/22/2018     Surgeon(s) and Role:     * Buster Bansal MD - Primary    Assisting Surgeon: Maxime Carmona MD - Fellow    Pre-op Diagnosis:  Old tear of medial meniscus of left knee, unspecified tear type [M23.204]    Post-op Diagnosis:  Post-Op Diagnosis Codes:     * Old tear of medial meniscus of left knee, unspecified tear type [M23.204]    Procedure(s) (LRB):  ARTHROSCOPY, KNEE, WITH MENISCECTOMY (Left)    Anesthesia: General    Description of the findings of the procedure: complex tear of posterior horn of medial meniscus    Findings/Key Components: MMT tear identified and trimmed back to stable base w/ combination of biter and shaver. Medial compartment chondroplasty performed.     Estimated Blood Loss: <5mL         Specimens:   Specimen (12h ago, onward)    None          Discharge Note    SUMMARY     Admit Date: 8/22/2018    Discharge Date and Time:  08/22/2018 8:56 AM    Hospital Course (synopsis of major diagnoses, care, treatment, and services provided during the course of the hospital stay): to OR for L knee scope. No complications. D/C from PACU postop once criteria met.     Final Diagnosis: Post-Op Diagnosis Codes:     * Old tear of medial meniscus of left knee, unspecified tear type [M23.204]    Disposition: Home or Self Care    Follow Up/Patient Instructions:     Medications:  Reconciled Home Medications:      Medication List      CONTINUE taking these medications    aspirin 325 MG EC tablet  Commonly known as:  ECOTRIN  Take 1 tablet (325 mg total) by mouth 2 (two) times daily. Take an antacid with medication for 42 doses     atorvastatin 40 MG tablet  Commonly known as:  LIPITOR  Take 1 tablet (40 mg total) by mouth nightly.     fexofenadine 180 MG tablet  Commonly known as:  ALLEGRA  Take 1 tablet (180 mg total) by mouth once daily.     HYDROcodone-acetaminophen  mg per tablet  Commonly known as:   NORCO  Take 1 tablet by mouth every 6 (six) hours as needed.     lisinopril-hydrochlorothiazide 20-25 mg Tab  Commonly known as:  PRINZIDE,ZESTORETIC  Take 1 tablet by mouth once daily.     metoprolol succinate 50 MG 24 hr tablet  Commonly known as:  TOPROL-XL  Take 1 tablet (50 mg total) by mouth every evening.     pantoprazole 40 MG tablet  Commonly known as:  PROTONIX  Take 1 tablet (40 mg total) by mouth once daily.     promethazine 25 MG tablet  Commonly known as:  PHENERGAN  Take 1 tablet (25 mg total) by mouth every 6 (six) hours as needed for Nausea.     sertraline 100 MG tablet  Commonly known as:  ZOLOFT  Take 1 tablet (100 mg total) by mouth once daily.     testosterone 20.25 mg/1.25 gram (1.62 %) Glpm  Commonly known as:  ANDROGEL  Apply 2 pumps to shoulders daily     traMADol 50 mg tablet  Commonly known as:  ULTRAM  Take 1 tablet (50 mg total) by mouth every 6 (six) hours as needed for Pain.          No discharge procedures on file.

## 2018-08-22 NOTE — TRANSFER OF CARE
"Anesthesia Transfer of Care Note    Patient: Nilesh Sanchez    Procedure(s) Performed: Procedure(s) (LRB):  ARTHROSCOPY, KNEE, WITH MENISCECTOMY PARTIAL MEDIAL (Left)  ARTHROSCOPY, KNEE, WITH CHONDROPLASTY (Left)    Patient location: PACU    Anesthesia Type: general    Transport from OR: Transported from OR on 6-10 L/min O2 by face mask with adequate spontaneous ventilation    Post pain: adequate analgesia    Post assessment: no apparent anesthetic complications    Post vital signs: stable    Level of consciousness: awake, alert and oriented    Nausea/Vomiting: no nausea/vomiting    Complications: none    Transfer of care protocol was followed      Last vitals:   Visit Vitals  BP (!) 148/83 (BP Location: Right arm, Patient Position: Lying)   Pulse 74   Temp 36.4 °C (97.6 °F) (Oral)   Resp 18   Ht 6' 1" (1.854 m)   Wt 123.8 kg (273 lb)   SpO2 95%   BMI 36.02 kg/m²     "

## 2018-08-22 NOTE — INTERVAL H&P NOTE
The patient has been examined and the H&P has been reviewed:    I concur with the findings and no changes have occurred since H&P was written.    Anesthesia/Surgery risks, benefits and alternative options discussed and understood by patient/family.          Active Hospital Problems    Diagnosis  POA    Old tear of medial meniscus of left knee [M23.204]  Yes      Resolved Hospital Problems   No resolved problems to display.

## 2018-08-22 NOTE — PLAN OF CARE
Nilesh Sanchez has met all discharge criteria from Phase II. Vital Signs are stable, ambulating  without difficulty. Discharge instructions given, patient verbalized understanding. Discharged from facility via wheelchair in stable condition.

## 2018-08-22 NOTE — OP NOTE
DATE OF PROCEDURE: 8/22/2018    PREOPERATIVE DIAGNOSES:   1. Left knee medial meniscus tear.   2. Left knee chondromalacia    POSTOPERATIVE DIAGNOSES:   1. Left knee medial meniscus tear.   2. Left knee chondromalacia    PROCEDURES:   1. Left knee arthroscopic partial medial meniscectomy  2. Left knee arthroscopic chondroplasty    SURGEON: Buster Bansal M.D.     ASSISTANTS:   1. Maxime Carmona MD   2. Brittanie Hess    COMPLICATIONS: None.     POSITION: Supine with arthroscopic leg mccann.     ANESTHESIA: General endotracheal plus local.     COMPLICATIONS: None.     TOURNIQUET TIME:  None    EBL:  Minimal    EXAMINATION UNDER ANESTHESIA:   Knee: full range of motion, no evidence of instability.     ARTHROSCOPIC FINDINGS:   1. Complex tear posterior horn / body medial meniscus.   2. Intact lateral meniscus  3. Chondromalacia, patella grade 2/3    INDICATIONS: The patient is a 63 y.o.-year-old male with a history of left knee pain. MRI confirms a tear in his medial meniscus.  After the risks and benefits of surgery were discussed with the patient, including bleeding, infection, scarring, persistent pain, risk of blood clots (DVT), pulmonary embolism, compartment syndrome, damage to cartilage, damage to neurovascular structures, plus the risks of anesthesia, including, death, stroke, and heart attack, the patient wished to proceed with operative intervention.      DESCRIPTION OF PROCEDURE:     The patient and correct limb were identified and marked in the pre-op holding area.     The patient was brought in the room. After undergoing general endotracheal anesthesia,  he was placed on a well-padded operating table. his left lower extremity was prepped and draped in usual sterile fashion. A nonsterile tourniquet was placed high up around the thigh. A well padded arthroscopic leg mccann was used during the case. The contralateral leg was placed in a well padded Tuan stirrup with bony prominences all being well padded.       After infiltrating the joint and portal sites with a total of 30 cc of 0.25% marcaine, the standard inferolateral and inferomedial portals were created. Diagnostic arthroscopy performed.     CHONDROPLASTY:  The patellofemoral joint was entered. There was no significant chondromalacia on the trochlea and there was grade 2/3 changes on the undersurface of the patella.  Using a biter and combination of bre, the unstable cartilage from the undersurface of the patella was trimmed down to a stable rim.    There was a mild synovitis noted within the anterior interval. An VAPR device was used to remove areas of irritating synovium from the overlying trochlea in the anterior interval to allow for visualization to minimize abrasion.    Attention turned to the medial compartment. Medial femoral condyle was intact. There was a complex tear in the body and posterior horn of the medial meniscus, Using combination of straight and curved biters and arthroscopic bre, the unstable portion of the medial meniscus was trimmed down to a stable rim. Approximately 30% of the body of the medial meniscus was resected down to get this to a stable position.     Attention was turned to the intercondylar notch. The ACL and PCL were intact.     Attention was turned to the lateral compartment. The lateral meniscus was intact and the lateral femoral condyle and lateral tibial plateau were intact.      Portal sites were closed with 4-0 Monocryl, covered with Mastisol, Steri-Strips, Xeroform, 4 x 4 fluffs, ABD pads and thigh-high CHEVY hose.    The patient was extubated in the room, transferred to Recovery Room in stable condition accompanied by his physician.  There were no complications in the case.     I was present, scrubbed and did perform all critical portions of the case.      ASHLEIGH MACIEL MD

## 2018-08-22 NOTE — DISCHARGE INSTRUCTIONS
[address], [place]                                                                                          [phone]                   Postoperative Instructions for Knee Surgery                 Your Surgery Included:   Open               Arthroscopic   [] Ligament Repair       [] Diagnostic           [] ACL     [] PCL     [] MCL     [] PLLC      [] Synovectomy / Plica Removal [] Meniscal Cartilage Repair / Transplantation      [] Lysis of Adhesions / Manipulation [] Articular Cartilage Repair      [] Interval Release           [] Microfracture       [] OATS   [] ACI      [x] Meniscectomy           [] Osteochondral Allograft      [] Meniscal Cartilage Repair  [] Patellar Realignment       [x] Debridement / Chondroplasty         [] Lateral Release   [] Ligament Repair      [] Articular Cartilage Repair          [] Extensor Mechanism             []   Microfracture  []  OATS         []  Cartilage Biopsy [] Tendon Repair          [] Ligament Reconstruction          [] Patella                  [] Quadriceps             []   ACL    []   PCL  [] High Tibial Osteotomy       [] PRP Arthrocentesis  [] Joint Replacement         [] Amniox Arthrocentesis           [] Unicompartmental   [] Patellofemoral                    [] Total Knee                  Call our office ([phone]) immediately if you experience any of the following:       Excessive bleeding or pus like drainage at the incision site       Uncontrollable pain not relieved by pain medication       Excessive swelling or redness at the incision site       Fever above 101.5 degrees not controlled with Tylenol or Motrin       Shortness of Breath       Any foul odor or blistering from the surgery site    FOR EMERGENCIES: If any unusual problems or difficulties occur, call our office at [phone], or page the  at [phone] who will direct your call appropriately    1.   Pain Management: A cold therapy cuff,  pain medications, local injections, and in some cases, regional anesthesia injections are used to manage your post-operative pain. The decision to use each of these options is based on their risks and benefits.     Medications: You were given one or more of the following medication prescriptions before leaving the hospital. Have the prescriptions filled at a pharmacy on your way home and follow the instructions on the bottles. If you need a refill, please call your pharmacy.      Narcotic Medication (usually Vicodin ES, Lortab, Percocet or Nucynta): Begin taking the medication before your knee starts to hurt. Some patients do not like to take any medication, but if you wait until your pain is severe before taking, you will be very uncomfortable for several hours waiting for the narcotic to work. Always take with food.     Nausea / Vomiting: For this issue, we prescribe Phenergan, use this medication as directed.     Cold Therapy: You may have been sent home with a [facility] cold therapy unit and wrap for your knee. Fill with ice and water to the indicated fill line and use throughout the day for the first two days and then as needed to help relieve pain and control swelling.      Regional Anesthesia Injections (Blocks): You may have been given a regional nerve block either before or after surgery. This may make your entire leg numb for 24-36 hours.                            * Proceed with caution when bearing weight on your leg.     2.   Diet: Eat a bland diet for the first day after surgery. Progress your diet as tolerated. Constipation may occur with Narcotic usage, contact our office if you are experiencing constipation.    3.   Activity: Limit your activity during the first 48 hours, keep your leg elevated with pillows under your heel. After the first 48 hours at home, increase your activity level based on your symptoms.    4.   Dressing Change: Remove the dressing on the 3rd day. It is normal for some blood  "to be seen on the dressings. It is also normal for you to see apparent bruising on the skin around your knee when you remove the dressing. If present, leave the steri-strip tape across the incisions. If you are concerned by the drainage or the appearance of your knee, please call one of the numbers listed below.    5.   Showering: You may shower on the 3rd day after surgery with waterproof bandages over small incisions. If you have an incision with Prineo (clear mesh), it does not need to be covered. Do not submerge in any water until after your postoperative appointment in clinic.    6.   Your procedure did not require a post-operative brace.    7.   Your procedure did not require a Continuous Passive Motion (CPM) device.    8.   Weight Bearing: You may have been sent home with crutches. If instructed (see below), use these crutches at all times unless at complete rest.      [] Non-weight bearing for     {NUMBER:66221} weeks (you may touch your toes to the floor)      [] Partial weight bearing for  {NUMBER:17314} weeks    [] 25% Body Weight   [] 50% Body Weight      [x] Full weight bearing            [x]  NOW    []  after {NUMBER:92825} weeks     9.  Knee Exercises: Begin these exercises the first day after surgery in order to help you regain your motion and strength. You may do the following marked exercises:     [x] Quad Sets - Begin activating your quadriceps muscle by driving your          knee downward into full knee extension while seated on a table or bed   with a towel rolled and propped under your heel     [x] Straight Leg Raise (SLR) - While yolanda your quadriceps muscle, lift     your fully extended leg to the level of your non-operative knee (as shown)     [x] Heel Slides - With the knee straight, slide your heel slowly toward your   buttocks, hold at the endpoint for 10-15 seconds, then slowly straighten     [x] Ankle pumps - With your knee straight, move your ankle in a "pumping"    fashion to " activate your calf and leg muscles      10.  Physical Therapy: Physical therapy is an essential component to your recovery from surgery. Your physical therapy will start in 1 days.    FIRST POSTOPERATIVE VISIT: As scheduled.       Discharge Instructions: After Your Surgery  Youve just had surgery. During surgery, you were given medicine called anesthesia to keep you relaxed and free of pain. After surgery, you may have some pain or nausea. This is common. Here are some tips for feeling better and getting well after surgery.     Stay on schedule with your medicine.     Going home  Your healthcare provider will show you how to take care of yourself when you go home. He or she will also answer your questions. Have an adult family member or friend drive you home. For the first 24 hours after your surgery:    · Do not drive or use heavy equipment.  · Do not make important decisions or sign legal papers.  · Do not drink alcohol.  · Have someone stay with you, if needed. He or she can watch for problems and help keep you safe.    Be sure to go to all follow-up visits with your healthcare provider. And rest after your surgery for as long as your healthcare provider tells you to.    Coping with pain  If you have pain after surgery, pain medicine will help you feel better. Take it as told, before pain becomes severe. Also, ask your healthcare provider or pharmacist about other ways to control pain. This might be with heat, ice, or relaxation. And follow any other instructions your surgeon or nurse gives you.    Tips for taking pain medicine  To get the best relief possible, remember these points:    · Pain medicines can upset your stomach. Taking them with a little food may help.  · Most pain relievers taken by mouth need at least 20 to 30 minutes to start to work.  · Taking medicine on a schedule can help you remember to take it. Try to time your medicine so that you can take it before starting an activity. This might be  before you get dressed, go for a walk, or sit down for dinner.  · Constipation is a common side effect of pain medicines. Call your healthcare provider before taking any medicines such as laxatives or stool softeners to help ease constipation. Also ask if you should skip any foods. Drinking lots of fluids and eating foods such as fruits and vegetables that are high in fiber can also help. Remember, do not take laxatives unless your surgeon has prescribed them.  · Drinking alcohol and taking pain medicine can cause dizziness and slow your breathing. It can even be deadly. Do not drink alcohol while taking pain medicine.  · Pain medicine can make you react more slowly to things. Do not drive or run machinery while taking pain medicine.    Your healthcare provider may tell you to take acetaminophen to help ease your pain. Ask him or her how much you are supposed to take each day. Acetaminophen or other pain relievers may interact with your prescription medicines or other over-the-counter (OTC) medicines. Some prescription medicines have acetaminophen and other ingredients. Using both prescription and OTC acetaminophen for pain can cause you to overdose. Read the labels on your OTC medicines with care. This will help you to clearly know the list of ingredients, how much to take, and any warnings. It may also help you not take too much acetaminophen. If you have questions or do not understand the information, ask your pharmacist or healthcare provider to explain it to you before you take the OTC medicine.    Managing nausea  Some people have an upset stomach after surgery. This is often because of anesthesia, pain, or pain medicine, or the stress of surgery. These tips will help you handle nausea and eat healthy foods as you get better. If you were on a special food plan before surgery, ask your healthcare provider if you should follow it while you get better. These tips may help:    · Do not push yourself to eat. Your  body will tell you when to eat and how much.  · Start off with clear liquids and soup. They are easier to digest.  · Next try semi-solid foods, such as mashed potatoes, applesauce, and gelatin, as you feel ready.  · Slowly move to solid foods. Dont eat fatty, rich, or spicy foods at first.  · Do not force yourself to have 3 large meals a day. Instead eat smaller amounts more often.  · Take pain medicines with a small amount of solid food, such as crackers or toast, to avoid nausea.     Call your surgeon if  · You still have pain an hour after taking medicine. The medicine may not be strong enough.  · You feel too sleepy, dizzy, or groggy. The medicine may be too strong.  · You have side effects like nausea, vomiting, or skin changes, such as rash, itching, or hives.       If you have obstructive sleep apnea  You were given anesthesia medicine during surgery to keep you comfortable and free of pain. After surgery, you may have more apnea spells because of this medicine and other medicines you were given. The spells may last longer than usual.   At home:    · Keep using the continuous positive airway pressure (CPAP) device when you sleep. Unless your healthcare provider tells you not to, use it when you sleep, day or night. CPAP is a common device used to treat obstructive sleep apnea.  · Talk with your provider before taking any pain medicine, muscle relaxants, or sedatives. Your provider will tell you about the possible dangers of taking these medicines.    © 0084-0712 [employer]. [address] [place] [address], [place] [address]. All rights reserved. This information is not intended as a substitute for professional medical care. Always follow your healthcare professional's instructions.        Select on Hyperlink below to print updated instructions from [employer]  [employer]      FOLLOW ANY OTHER INSTRUCTIONS GIVEN PER DR MACIEL!!!!!!!      Crutch Walking  Crutch adjustment    Make sure the  crutches you use are adjusted to fit you. When you stand, there should be room to fit 2 to 3 fingers between the top of the crutch and your armpit. Your elbow should be slightly bent when holding the hand . When your arms hang down, the crutch handle should be at the top of your hip.   Crutch walking  Place the crutches forward about 1 foot in front of you. The crutches should be a little farther apart than your body. Lean your weight forward as you push down on the hand . Make sure your weight is on your hands and your strong leg, not your armpits. Let your body swing forward, landing on the strong leg. Move the crutches forward again. The crutch and your injured leg should move together.  Going up steps with no handrails  (Up with the good leg)  · With both crutches (under each armpit) on the same step as your feet, push down on the hand .  · Balancing with very light pressure on the weak leg, let your hands support your weight. Raise your strong leg onto the next higher step.  · Transfer all your weight to your strong leg (still bent). Move the crutches up to the next step, next to your strong leg.  · Keep your weight evenly balanced on the two crutches and your strong leg. Straighten your strong knee as you raise your weak leg up to the next step.  Going down steps with no handrails  (Down with the bad leg)  · With both crutches (under each armpit) on the same step as your feet, push down on the hand .  · Keep your weight evenly balanced on the two crutches and your strong leg. Bend your strong knee as you lower your weak leg down to the next step.  · Let your strong leg support you (still bent) as you move the crutches down next to the weak leg.  · Transfer your weight to your hands. Balance with very light pressure on your weak leg as you lower your strong leg next to your weak leg  Going up steps with handrails  (Up with the good leg)  · Face the stairs, holding the handrail with one hand.  Place both crutches under your armpit on the opposite side. Push down on the hand .  · Balancing with very light pressure on the weak leg, let your hands support your weight. Raise your strong leg onto the next higher step.  · Transfer all your weight to your strong leg (still bent) as you move the crutches up (while holding on to the handrail) to the next step next to the strong leg.  · Keep your weight evenly balanced on the handrail, the crutches (still under the same armpit opposite the handrail), and your strong leg. Straighten your strong knee as you raise the weak leg up to the next step.  Going down steps with handrails  (Down with the bad leg)  · Face the stairs, holding the handrail with one hand. Place both crutches under your armpit on the opposite side. Push down on the hand .  · Balance your weight evenly on the crutches, handrail, and your strong leg. Then bend your strong knee as you lower the weak leg down to the next step.  · Let the handrail and your strong leg support you (still bent) as you move the crutches down alongside the weak leg.  · While holding on to the handrail and crutches (under the same armpit on the other side), transfer your weight to your hands, balancing with very light pressure on the weak leg as you lower your strong leg alongside your weak leg  Tip: If you are worried about falling or you feel unsteady, try sitting when going up or down stairs instead. Sit on the bottom step and keep your injured leg out in front of you. Hold your crutches flat against the stairs. Then slide up to the next step on your bottom. Use your free hand and good leg for support. Face the same way when going down stairs.  Date Last Reviewed: 10/6/2015  © 1157-6777 [employer]. [address] [place] [address], [place] [address]. All rights reserved. This information is not intended as a substitute for professional medical care. Always follow your healthcare professional's  instructions.        Anesthesia: After Your Surgery  Youve just had surgery. During surgery, you received medication called anesthesia to keep you comfortable and pain-free. After surgery, you may experience some pain or nausea. This is common. Here are some tips for feeling better and recovering after surgery.    Going home  Your doctor or nurse will show you how to take care of yourself when you go home. He or she will also answer your questions. Have an adult family member or friend drive you home. For the first 24 hours after your surgery:  · Do not drive or use heavy equipment.  · Do not make important decisions or sign legal documents.  · Avoid alcohol.  · Have someone stay with you, if needed. He or she can watch for problems and help keep you safe.  Be sure to keep all follow-up appointments with your doctor. And rest after your procedure for as long as your doctor tells you to.    Coping with pain  If you have pain after surgery, pain medication will help you feel better. Take it as directed, before pain becomes severe. Also, ask your doctor or pharmacist about other ways to control pain, such as with heat, ice, and relaxation. And follow any other instructions your surgeon or nurse gives you.    Tips for taking pain medication  To get the best relief possible, remember these points:  · Pain medications can upset your stomach. Taking them with a little food may help.  · Most pain relievers taken by mouth need at least 20 to 30 minutes to take effect.  · Taking medication on a schedule can help you remember to take it. Try to time your medication so that you can take it before beginning an activity, such as dressing, walking, or sitting down for dinner.  · Constipation is a common side effect of pain medications. Contact your doctor before taking any medications like laxatives or stool softeners to help relieve constipation. Also ask about any dietary restrictions, because drinking lots of fluids and eating  foods like fruits and vegetables that are high in fiber can also help. Remember, dont take laxatives unless your surgeon has prescribed them.  · Mixing alcohol and pain medication can cause dizziness and slow your breathing. It can even be fatal. Dont drink alcohol while taking pain medication.  · Pain medication can slow your reflexes. Dont drive or operate machinery while taking pain medication.  If your health care provider tells you to take acetaminophen to help relieve your pain, ask him or her how much you are supposed to take each day. (Acetaminophen is the generic name for Tylenol and other brand-name pain relievers.) Acetaminophen or other pain relievers may interact with your prescription medicines or other over-the-counter (OTC) drugs. Some prescription medications contain acetaminophen along with other active ingredients. Using both prescription and OTC acetaminophen for pain can cause you to overdose. The FDA recommends that you read the labels on your OTC medications carefully. This will help you to clearly understand the list of active ingredients, dosing instructions, and any warnings. It may also help you avoid taking too much acetaminophen. If you have questions or don't understand the information, ask your pharmacist or health care provider to explain it to you before you take the OTC medication.    Managing nausea  Some people have an upset stomach after surgery. This is often due to anesthesia, pain, pain medications, or the stress of surgery. The following tips will help you manage nausea and get good nutrition as you recover. If you were on a special diet before surgery, ask your doctor if you should follow it during recovery. These tips may help:  · Dont push yourself to eat. Your body will tell you when to eat and how much.  · Start off with clear liquids and soup. They are easier to digest.  · Progress to semi-solid foods (mashed potatoes, applesauce, and gelatin) as you feel  ready.  · Slowly move to solid foods. Dont eat fatty, rich, or spicy foods at first.  · Dont force yourself to have three large meals a day. Instead, eat smaller amounts more often.  · Take pain medications with a small amount of solid food, such as crackers or toast to avoid nausea.      Call your surgeon if  · You still have pain an hour after taking medication (it may not be strong enough).  · You feel too sleepy, dizzy, or groggy (medication may be too strong).  · You have side effects like nausea, vomiting, or skin changes (rash, itching, or hives).   © 7217-6130 [employer]. [address] [place] [address], [place] [address]. All rights reserved. This information is not intended as a substitute for professional medical care. Always follow your healthcare professional's instructions.

## 2018-08-22 NOTE — ANESTHESIA POSTPROCEDURE EVALUATION
"Anesthesia Post Evaluation    Patient: Nilesh Sanchez    Procedure(s) Performed: Procedure(s) (LRB):  ARTHROSCOPY, KNEE, WITH MENISCECTOMY PARTIAL MEDIAL (Left)  ARTHROSCOPY, KNEE, WITH CHONDROPLASTY (Left)    Final Anesthesia Type: general  Patient location during evaluation: PACU  Patient participation: Yes- Able to Participate  Level of consciousness: awake and alert and oriented  Post-procedure vital signs: reviewed and stable  Pain management: adequate  Airway patency: patent  PONV status at discharge: No PONV  Anesthetic complications: no      Cardiovascular status: blood pressure returned to baseline and hemodynamically stable  Respiratory status: unassisted, spontaneous ventilation and room air  Hydration status: euvolemic  Follow-up not needed.        Visit Vitals  BP (!) 148/85 (BP Location: Left arm, Patient Position: Lying)   Pulse 62   Temp 36.8 °C (98.2 °F) (Oral)   Resp 18   Ht 6' 1" (1.854 m)   Wt 123.8 kg (273 lb)   SpO2 96%   BMI 36.02 kg/m²       Pain/Justina Score: Pain Assessment Performed: Yes (8/22/2018 10:25 AM)  Presence of Pain: denies (8/22/2018 10:25 AM)  Justina Score: 10 (8/22/2018 10:25 AM)        "

## 2018-08-23 ENCOUNTER — CLINICAL SUPPORT (OUTPATIENT)
Dept: REHABILITATION | Facility: HOSPITAL | Age: 63
End: 2018-08-23
Attending: PHYSICIAN ASSISTANT
Payer: COMMERCIAL

## 2018-08-23 DIAGNOSIS — R26.89 BALANCE PROBLEM: ICD-10-CM

## 2018-08-23 DIAGNOSIS — R29.898 DECREASED STRENGTH OF LOWER EXTREMITY: ICD-10-CM

## 2018-08-23 DIAGNOSIS — M25.662 DECREASED RANGE OF MOTION OF LEFT KNEE: ICD-10-CM

## 2018-08-23 PROCEDURE — 97116 GAIT TRAINING THERAPY: CPT | Mod: PN

## 2018-08-23 PROCEDURE — 97110 THERAPEUTIC EXERCISES: CPT | Mod: PN

## 2018-08-23 PROCEDURE — 97161 PT EVAL LOW COMPLEX 20 MIN: CPT | Mod: PN
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
## 2018-08-23 NOTE — PATIENT INSTRUCTIONS
Quad Sets        Slowly tighten thigh muscles of straight, left leg while counting out loud to __5__. Relax.  Repeat __30__ times. Do __3__ sessions per day.     https://[email or web address].us/292     Copyright © I. All rights reserved.   Hamstring Stretch (Standing)        Standing, place one heel on chair or bench. Use one or both hands on thigh for support. Keeping torso straight, lean forward slowly until a stretch is felt in back of same thigh.  Hold __30__ seconds. Repeat 3 times. Do 3 times per day.     Copyright © [employer]. All rights reserved.   Gastroc / Heel Cord Stretch - Seated With Towel        Sit on floor, towel around ball of foot. Gently pull foot in toward body, stretching heel cord and calf. Hold for _30__ seconds. Repeat on involved leg.  Repeat __3_ times. Do __3_ times per day.    Copyright © [employer]. All rights reserved.

## 2018-08-28 ENCOUNTER — CLINICAL SUPPORT (OUTPATIENT)
Dept: REHABILITATION | Facility: HOSPITAL | Age: 63
End: 2018-08-28
Attending: PHYSICIAN ASSISTANT
Payer: COMMERCIAL

## 2018-08-28 DIAGNOSIS — R29.898 DECREASED STRENGTH OF LOWER EXTREMITY: ICD-10-CM

## 2018-08-28 DIAGNOSIS — R26.89 BALANCE PROBLEM: ICD-10-CM

## 2018-08-28 PROBLEM — M25.662 DECREASED RANGE OF MOTION OF LEFT KNEE: Status: ACTIVE | Noted: 2018-08-28

## 2018-08-28 PROCEDURE — 97110 THERAPEUTIC EXERCISES: CPT | Mod: PN

## 2018-08-28 NOTE — PROGRESS NOTES
[facility] Physical Therapy Daily Note    Name: Nilesh Sanchez  Clinic Number: [id number]  Date of Treatment: 8/28/2018   Diagnosis:   Encounter Diagnoses   Name Primary?    Decreased strength of lower extremity     Balance problem        Visit number: 2  Start date: 8/23/18  POC End date: 10/18/18    Time in: 6:55  Time Out: 7:55  Total Treatment Time:     Precautions: s/p L meniscectomy performed by Dr. Bansal on 8/22/18    Subjective:    Nilesh reports  improvement of symptoms to  L knee. Patient reports their pain to be 2-4/10 on a 0-10 scale with 0 being no pain and 10 being the worst pain imaginable. He reports increased pain if he twists on his L LE.     Objective  L knee AROM flexion 134  L knee AROM ext +2    Nilesh received therapeutic exercises to develop strength, endurance, ROM and flexibility for 40 minutes including:   Quad set towel roll x 30 with 3 second hold  Quad set flat x 30 with 3 second hold  SLR 2 x 10  SLR hip abd 2 x 10  SLR hip add 2 x 10  Seated EOT L knee flex  Gait over flat cones for proper sequencing  Upright bike x 5 minutes level 1 seat #8  Gastroc stretch 3 x 30 seconds  HS stretch 3 x 30 seconds    CP to L knee x 10 minutes    Nilesh received the following manual therapy techniques: Joint mobilizations were applied to the: L knee into sup/inf and med/lat glides for 5 minutes.     Written Home Exercises Provided: none today. Cont with current HEP  Pt demo good understanding of the education provided. Nilesh demonstrated good return demonstration of activities.     HEP next visit: SL hip abd and add    Assessment:   Pt presents with improving L knee AROM into flex and ext. He was progressed with exercises without reports of increased pain to L knee.     Pt will continue to benefit from skilled PT intervention. Medical Necessity is demonstrated by:  Pain limits function of effected part for some activities, Unable to participate fully in daily  activities, Requires skilled supervision to complete and progress HEP, Weakness and decreased ROM.    Patient is making good progress towards established goals.    New/Revised goals: none   Short Term Goals:  4 weeks  1. Pt will present with increased L knee AROM flexion by 10 degrees for increased ease with ADLs  2. Pt will present with increased L knee ext to 0 degrees for improved heel strike with ambulation  3. Pt will participate in L knee MMT  4. Pt will present with increased hip MMT to 4+/5 B to decrease stress to L knee.   5. Pt will present with normalized gait pattern without AD.      Long Term Goals: 6-8 weeks  1. Pt will present with increased L knee AROM flexion to equal R.  2. Pt will present with L knee ext equal to R.   3. Pt will present with L knee MMT to 5/5   4. Pt will present with increased hip MMT to 5/5 B to decrease stress to L knee.   5. Pt will be independent with HEP and self management of symptoms.     Plan:  Continue with established Plan of Care towards PT goals.

## 2018-09-06 ENCOUNTER — OFFICE VISIT (OUTPATIENT)
Dept: SPORTS MEDICINE | Facility: CLINIC | Age: 63
End: 2018-09-06
Payer: COMMERCIAL

## 2018-09-06 VITALS
DIASTOLIC BLOOD PRESSURE: 90 MMHG | WEIGHT: 274 LBS | SYSTOLIC BLOOD PRESSURE: 139 MMHG | HEIGHT: 73 IN | BODY MASS INDEX: 36.31 KG/M2 | HEART RATE: 64 BPM

## 2018-09-06 DIAGNOSIS — Z98.890 S/P LEFT KNEE ARTHROSCOPY: Primary | ICD-10-CM

## 2018-09-06 PROCEDURE — 99999 PR PBB SHADOW E&M-EST. PATIENT-LVL III: CPT | Mod: PBBFAC,,, | Performed by: PHYSICIAN ASSISTANT

## 2018-09-06 PROCEDURE — 99499 UNLISTED E&M SERVICE: CPT | Mod: S$GLB,,, | Performed by: PHYSICIAN ASSISTANT

## 2018-09-06 NOTE — PROGRESS NOTES
HISTORY OF PRESENT ILLNESS:   Pt is here today for first post-operative followup of his left knee arthroscopy.  he is doing well.  We have reviewed his findings and discussed plan of care and future treatment options. He is going to PT twice weekly. He developed some pain in his right hip/back which he attributes to limping prior to and a little bit after knee surgery. He went to the ED for this pain 2 days ago. He is no longer requiring pain control.    DATE OF PROCEDURE: 8/22/2018     PREOPERATIVE DIAGNOSES:   1. Left knee medial meniscus tear.   2. Left knee chondromalacia     POSTOPERATIVE DIAGNOSES:   1. Left knee medial meniscus tear.   2. Left knee chondromalacia     PROCEDURES:   1. Left knee arthroscopic partial medial meniscectomy  2. Left knee arthroscopic chondroplasty     SURGEON: Buster Bansal M.D.                                                                                PHYSICAL EXAMINATION:     Incision sites healed well  No evidence of any erythema, infection or induration  Range of motion 0-120 degrees  Minimal effusion  2+ DP pulse  No swelling, no calf tenderness  - Jhony's sign  Negative medial joint line tendernes  Moderate quad atrophy                                                                                 ASSESSMENT:                                                                                                                                               1. Status post above, doing well.                                                                                                                               PLAN:                                                                                                                                                     1. Continue with PT- add in PT for right hip/back  2. Emphasized quad function.  3. I have discussed return to activity in detail.  4. he will see us back in 4 weeks.                                      5. All  questions were answered and he should contact us if he has any questions or concerns in the interim.

## 2018-09-07 ENCOUNTER — CLINICAL SUPPORT (OUTPATIENT)
Dept: REHABILITATION | Facility: HOSPITAL | Age: 63
End: 2018-09-07
Attending: PHYSICIAN ASSISTANT
Payer: COMMERCIAL

## 2018-09-07 DIAGNOSIS — R29.898 DECREASED STRENGTH OF LOWER EXTREMITY: ICD-10-CM

## 2018-09-07 DIAGNOSIS — R26.89 BALANCE PROBLEM: ICD-10-CM

## 2018-09-07 PROCEDURE — 97110 THERAPEUTIC EXERCISES: CPT | Mod: PN

## 2018-09-07 PROCEDURE — 97140 MANUAL THERAPY 1/> REGIONS: CPT | Mod: PN

## 2018-09-07 PROCEDURE — 97035 APP MDLTY 1+ULTRASOUND EA 15: CPT | Mod: PN

## 2018-09-07 NOTE — PROGRESS NOTES
[facility] Physical Therapy Daily Note    Name: Nilesh Sanchez  Clinic Number: [id number]  Date of Treatment: 9/7/2018   Diagnosis:   Encounter Diagnoses   Name Primary?    Decreased strength of lower extremity     Balance problem      Visit number: 3  Start date: 8/23/18  POC End date: 10/18/18    Time in: 6:58  Time Out: 8:00  Total Treatment Time: 58    Precautions: s/p L meniscectomy performed by Dr. Bansal on 8/22/18    Subjective:    Nilesh reports his L knee is feeling pretty good, no issues. His R hip has been bothering him, enough so for him to go to the ER on tuesday. Patient reports their pain to be 4/10 to R hip/lumbar pain and 0/10 to L knee. Pain measured on a 0-10 scale with 0 being no pain and 10 being the worst pain imaginable.      Objective    Nilesh received therapeutic exercises to develop strength, endurance, ROM and flexibility for 40 minutes including:     Push pull for R anterior rotation x 3 with 3 second hold  R piriformis stretch knee to opposite shoulder 3 x 30 seconds  Prone quad stretch with strap on R 3 x 30 seconds  Upright bike x 5 minutes level 1 seat #8  Quad set towel roll x 30 with 3 second hold  Quad set flat x 30 with 3 second hold  SLR 2 x 10  Gastroc stretch 3 x 30 seconds  HS stretch 3 x 30 seconds    SLR hip abd 2 x 10 - Hold  SLR hip add 2 x 10 - Hold    CP to L knee x 10 minutes    Nilesh received the following manual therapy techniques: Joint mobilizations were applied to the: glut med strain counter strain, L knee into sup/inf glides grade III-IV and scar mobilization to R knee healed incisions for 10 minutes.     Patient receives ultrasound  for pain control and decreased inflammation @ 100% duty cycle, 1.0 Mhz, applied to R glut med and piriformis, intensity = 1.2 w/cm2 for 8 minutes.    Written Home Exercises Provided: Add single knee to opposite shoulder on R and prone quad stretch on R for R sided hip/LB pain  Pt demo good  understanding of the education provided. Nilesh demonstrated good return demonstration of activities.     HEP next visit: SL hip abd and add    Assessment:   Pt presents with increased tightness to R lateral hip with noted tenderness and tightness over proximal glut med and piriformis; tightness decreased with strain counter strain and ultrasound, however, pt reports he twinge to R hip with getting off of mat table. He denies pain to L knee during treatment and demonstrates improving quad contraction. He presents with improving scar mobility to L knee.      Pt will continue to benefit from skilled PT intervention. Medical Necessity is demonstrated by:  Pain limits function of effected part for some activities, Unable to participate fully in daily activities, Requires skilled supervision to complete and progress HEP, Weakness and decreased ROM.    Patient is making good progress towards established goals.    New/Revised goals: none   Short Term Goals:  4 weeks  1. Pt will present with increased L knee AROM flexion by 10 degrees for increased ease with ADLs  2. Pt will present with increased L knee ext to 0 degrees for improved heel strike with ambulation  3. Pt will participate in L knee MMT  4. Pt will present with increased hip MMT to 4+/5 B to decrease stress to L knee.   5. Pt will present with normalized gait pattern without AD.      Long Term Goals: 6-8 weeks  1. Pt will present with increased L knee AROM flexion to equal R.  2. Pt will present with L knee ext equal to R.   3. Pt will present with L knee MMT to 5/5   4. Pt will present with increased hip MMT to 5/5 B to decrease stress to L knee.   5. Pt will be independent with HEP and self management of symptoms.     Plan:  Continue with established Plan of Care towards PT goals.

## 2018-09-07 NOTE — PATIENT INSTRUCTIONS
Extensors / Rotators, Supine        Lie supine, one leg straight, other leg bent, knee held by opposite hand. Gently pull knee toward opposite shoulder. Feel stretch in buttocks and outside of hip. Hold _30__ seconds.  Repeat _3__ times per session. Do __2-3_ sessions per day.    Copyright © I. All rights reserved.   Prone Heel-to-Buttocks Stretch        Lying on stomach, with a strap (doc line, dog leash, sheet, etc) bring heel of one foot toward buttocks while exhaling. Perform on R side  Hold 30 seconds. Repeat __3__ times. Do __2-3__ sessions per day.     [email or web address]     Copyright © [facility]. All rights reserved.

## 2018-09-11 ENCOUNTER — CLINICAL SUPPORT (OUTPATIENT)
Dept: REHABILITATION | Facility: HOSPITAL | Age: 63
End: 2018-09-11
Attending: PHYSICIAN ASSISTANT
Payer: COMMERCIAL

## 2018-09-11 DIAGNOSIS — R29.898 DECREASED STRENGTH OF LOWER EXTREMITY: ICD-10-CM

## 2018-09-11 DIAGNOSIS — R26.89 BALANCE PROBLEM: ICD-10-CM

## 2018-09-11 PROCEDURE — 97140 MANUAL THERAPY 1/> REGIONS: CPT | Mod: PN

## 2018-09-11 PROCEDURE — 97110 THERAPEUTIC EXERCISES: CPT | Mod: PN

## 2018-09-11 PROCEDURE — 97035 APP MDLTY 1+ULTRASOUND EA 15: CPT | Mod: PN

## 2018-09-11 RX ORDER — TESTOSTERONE 50 MG/5G
GEL TRANSDERMAL
Qty: 150 G | Refills: 0 | OUTPATIENT
Start: 2018-09-11

## 2018-09-11 NOTE — PROGRESS NOTES
[facility] Physical Therapy Daily Note    Name: Nilesh Sanchez  Clinic Number: [id number]  Date of Treatment: 9/11/2018   Diagnosis:   Encounter Diagnoses   Name Primary?    Decreased strength of lower extremity     Balance problem      Visit number: 4  Start date: 8/23/18  POC End date: 10/18/18    Time in: 4:00 pm  Time Out: 5:00 pm  Total Treatment Time: 60    Precautions: s/p L meniscectomy performed by Dr. Bansal on 8/22/18    Subjective:    Nilesh reports a little stiffness to L knee after riding in car and sitting for a while which improved with walking around. No other issues. He continues with R LB pain which has traveled up to his back a little more than hip since last visit.     Patient reports their pain to be 2-4/10 currently and can to R hip/lumbar pain and 0/10 to L knee. Pain measured on a 0-10 scale with 0 being no pain and 10 being the worst pain imaginable.      Objective    R knee AROM flex 142    Nilesh received therapeutic exercises to develop strength, endurance, ROM and flexibility for 40 minutes including:   Seated trunk side bend to R 10 x 10 second hold  LTR x 10 with 10 second hold  Active HS stretch x 10   Bridge x 10  Upright bike x 5 minutes level 2 seat #8  Quad set towel roll x 30 with 3 second hold  Quad set flat x 30 with 3 second hold  SLR 2 x 10    SLR hip abd 2 x 10 - Hold  SLR hip add 2 x 10 - Hold    CP to L knee x 10 minutes    Nilesh received the following manual therapy techniques: Joint mobilizations were applied to the: QL strain counter strain, L knee into sup/inf glides grade III-IV and scar mobilization to R knee healed incisions for 10 minutes total .     Patient receives ultrasound  for pain control and decreased inflammation @ 100% duty cycle, 1.0 Mhz, applied to R QL, intensity = 1.2 w/cm2 for 8 minutes.    Written Home Exercises Provided: Add single knee to opposite shoulder on R and prone quad stretch on R for R sided hip/LB  pain  Pt demo good understanding of the education provided. Nilesh demonstrated good return demonstration of activities.     Assessment:   Pt presents with improved L knee AROM and continued improvement in L knee scar tissue mobility. Tightness noted to R QL which improved with strain counterstrain and ultrasound. Possible dry needling next visit to lumbar spine/R hip.   Pt will continue to benefit from skilled PT intervention. Medical Necessity is demonstrated by:  Pain limits function of effected part for some activities, Unable to participate fully in daily activities, Requires skilled supervision to complete and progress HEP, Weakness and decreased ROM.    Patient is making good progress towards established goals.    New/Revised goals: none   Short Term Goals:  4 weeks  1. Pt will present with increased L knee AROM flexion by 10 degrees for increased ease with ADLs  2. Pt will present with increased L knee ext to 0 degrees for improved heel strike with ambulation  3. Pt will participate in L knee MMT  4. Pt will present with increased hip MMT to 4+/5 B to decrease stress to L knee.   5. Pt will present with normalized gait pattern without AD.      Long Term Goals: 6-8 weeks  1. Pt will present with increased L knee AROM flexion to equal R.  2. Pt will present with L knee ext equal to R.   3. Pt will present with L knee MMT to 5/5   4. Pt will present with increased hip MMT to 5/5 B to decrease stress to L knee.   5. Pt will be independent with HEP and self management of symptoms.     Plan:  Continue with established Plan of Care towards PT goals. Possible Dry needling next visit

## 2018-09-11 NOTE — PATIENT INSTRUCTIONS
Back Exercises: Side Stretch      To start, sit in a chair with your feet flat on the floor. Shift your weight slightly forward to avoid rounding your back. Relax. Keep your ears, shoulders, and hips aligned:  · Stretch your right arm overhead.  · Slowly bend to the left. Dont twist your torso. Stay within your pain limits.  · Hold for 20 seconds. Return to starting position.  · Repeat 2 to 5 times. Then, switch to the other side.  Date Last Reviewed: 10/13/2015  © 0638-3841 [employer]. [address] [place] [address]. All rights reserved. This information is not intended as a substitute for professional medical care. Always follow your healthcare professional's instructions.

## 2018-09-14 ENCOUNTER — CLINICAL SUPPORT (OUTPATIENT)
Dept: REHABILITATION | Facility: HOSPITAL | Age: 63
End: 2018-09-14
Attending: PHYSICIAN ASSISTANT
Payer: COMMERCIAL

## 2018-09-14 DIAGNOSIS — R26.89 BALANCE PROBLEM: ICD-10-CM

## 2018-09-14 DIAGNOSIS — R29.898 DECREASED STRENGTH OF LOWER EXTREMITY: ICD-10-CM

## 2018-09-14 PROCEDURE — 97140 MANUAL THERAPY 1/> REGIONS: CPT | Mod: PN | Performed by: PHYSICAL THERAPIST

## 2018-09-14 PROCEDURE — 97110 THERAPEUTIC EXERCISES: CPT | Mod: PN

## 2018-09-14 NOTE — PROGRESS NOTES
[facility] Outpatient Physical Therapy Daily Note    Name: Nilesh Sanchez  Clinic Number: [id number]  Date of Treatment: 9/14/2018   Diagnosis:   Encounter Diagnoses   Name Primary?    Decreased strength of lower extremity     Balance problem      Visit number: 4  Start date: 8/23/18  POC End date: 10/18/18    Time in: 7:00 am  Time Out: 7:50 am  Total Treatment Time: 20    Precautions: s/p L meniscectomy performed by Dr. Bansal on 8/22/18    Subjective:    Phil reports his L knee is feeling pretty good, still a little stiffness/soreness after sitting longer periods of time.     Patient reports their pain to be 4/10 currently and decreased to 2-3/10 to R hip/lumbar pain and 0/10 to L knee. Pain measured on a 0-10 scale with 0 being no pain and 10 being the worst pain imaginable.      Objective    Dry needling performed by Maye Reich PT. See her note for documentation.    Nilesh received therapeutic exercises to develop strength, endurance, ROM and flexibility for 40 minutes including:   Quad set towel roll x 30 with 3 second hold  Quad set flat x 30 with 3 second hold  SLR 2 x 10  TA contraction x 15 with 3 second hold - cramping to lumbar paraspinals  Hook lying glut sets 2 x 10  Active HS stretch x 10   SAQ - 3 x 10 with 3 second hold  Push push - modified for anterior ilial rotation    Bridge x 10 - Hold increased pain  Hold - Upright bike x 5 minutes level 2 seat #8  SLR hip abd 2 x 10 - Hold  SLR hip add 2 x 10 - Hold    Written Home Exercises Provided: modified push push and glut sets  Pt demo good understanding of the education provided. Nilesh demonstrated good return demonstration of activities.     Assessment:   Pt presents with continued decrease in quad control demonstrated through extensor lag with SLR, improved since last visit demonstrated through ability to maintain extension into flexion and decreased control with return to neutral.  Pt will continue to benefit from  skilled PT intervention. Medical Necessity is demonstrated by:  Pain limits function of effected part for some activities, Unable to participate fully in daily activities, Requires skilled supervision to complete and progress HEP, Weakness and decreased ROM.    Patient is making good progress towards established goals.    New/Revised goals: none   Short Term Goals:  4 weeks  1. Pt will present with increased L knee AROM flexion by 10 degrees for increased ease with ADLs  2. Pt will present with increased L knee ext to 0 degrees for improved heel strike with ambulation  3. Pt will participate in L knee MMT  4. Pt will present with increased hip MMT to 4+/5 B to decrease stress to L knee.   5. Pt will present with normalized gait pattern without AD.      Long Term Goals: 6-8 weeks  1. Pt will present with increased L knee AROM flexion to equal R.  2. Pt will present with L knee ext equal to R.   3. Pt will present with L knee MMT to 5/5   4. Pt will present with increased hip MMT to 5/5 B to decrease stress to L knee.   5. Pt will be independent with HEP and self management of symptoms.     Plan:  Continue with established Plan of Care towards PT goals. Possible Dry needling next visit

## 2018-09-14 NOTE — PROGRESS NOTES
Name: Nilesh Sanchez  Clinic Number: [id number]  Diagnosis:   Encounter Diagnoses   Name Primary?    Decreased strength of lower extremity     Balance problem      Physician: Heather Purdy PA-C  Treatment Orders: PT Eval and Treat  Past Medical History:   Diagnosis Date    Allergic rhinosinusitis     Anxiety     Back pain     with spasms    Diverticulitis     s/p partial colectomy    GERD (gastroesophageal reflux disease)     Hyperlipidemia     Hypertension        Precautions: s/p lumbar fusion December 2017, s/p knee scope L August 2018      Subjective     Pt reports: pain in R LB and hip    Pain Scale: before treatment: 4 currently; after treatment: 2-3    Objective     Time in: 7:10  Time out: 7:30    AR R pelvis    TREATMENT  Worked with pt on modified push/pull and realigned and able to note+ change in symptoms     Manual therapy: Nilesh  received the following manual therapy techniques x 15 min. were applied to the: R gluteal/piriformis and homeostatic point R LE  to include: Dry needling with trigger point/manual therapy techniques was performed as per dry needle flow sheetR gluteal/piriformis and homeostatic point R LE .   Dry needling consent form was reviewed with the patient addressing all questions and concerns and signed by patient.  Patient also gave verbal consent to undergo dry needling.  Copy of the consent form was not provided to patient as requested by patient.  All needles were removed and changes in signs and symptoms were noted in dry needle flow sheet decreased pain to 2-3.  Dry needling was performed to decrease inflammation, increase circulation, decrease pain and restore homeostasis.       Pt demo good understanding of the education provided.   Pt. education:  · No spiritual or educational barriers to learning provided  · Pt has no cultural, educational or language barriers to learning provided.    Assessment     Patient appears to understand increased awareness of symptoms and  to perform push/pull at onset of increased symptoms.   Patient responded well to dry needling as per dry needle treatment form with decreased pain to 2-3.  One needle hypersensitive in calf at sural and removed immediately.    Pt will continue to benefit from skilled outpatient physical therapy to address the remaining functional deficits, provide pt/family education, and to maximize pt's level of independence in the home and community environment. .       Anticipated barriers to physical therapy: none  Pt's spiritual, cultural and educational needs considered and pt agreeable to plan of care and goals        Plan   Continue with established Plan of Care towards PT goals with continuation of dry needling as indicated and recommended by treating PT.    Laura Reich, MS, PT

## 2018-09-14 NOTE — PATIENT INSTRUCTIONS
Gluteal Sets        Keep knees bent, then squeeze buttocks and hold for __2_ seconds. Relax for _2__ seconds. Repeat __10_ times. 2 sets. Do after performing push push.    Copyright © I. All rights reserved.     With push push, do not lift buttocks.

## 2018-09-18 ENCOUNTER — CLINICAL SUPPORT (OUTPATIENT)
Dept: REHABILITATION | Facility: HOSPITAL | Age: 63
End: 2018-09-18
Attending: PHYSICIAN ASSISTANT
Payer: COMMERCIAL

## 2018-09-18 DIAGNOSIS — R26.89 BALANCE PROBLEM: ICD-10-CM

## 2018-09-18 DIAGNOSIS — R29.898 DECREASED STRENGTH OF LOWER EXTREMITY: ICD-10-CM

## 2018-09-18 PROCEDURE — 97140 MANUAL THERAPY 1/> REGIONS: CPT | Mod: PN | Performed by: PHYSICAL THERAPIST

## 2018-09-18 PROCEDURE — 97110 THERAPEUTIC EXERCISES: CPT | Mod: PN

## 2018-09-18 NOTE — PROGRESS NOTES
[facility] Outpatient Physical Therapy Daily Note    Name: Nilesh Sanchez  Clinic Number: [id number]  Date of Treatment: 9/18/2018   Diagnosis:   Encounter Diagnoses   Name Primary?    Decreased strength of lower extremity     Balance problem      Visit number: 6  Start date: 8/23/18  POC End date: 10/18/18    Time in: 4:00 pm  Time Out: 4:50 pm  Total Treatment Time: 50    Precautions: s/p L meniscectomy performed by Dr. Bansal on 8/22/18    Subjective:    Phil reports his LB is like night and day, feels much better. He was able to bend over and tie his shoe. He reports he is feeling his L knee a little bit more now, just deep inside and feels it more when he twists on his L knee. Patient reports their pain to be 0/10 currently to L knee but increased with step ups to quick sharp 4/10. He reports back pain 1-2/10. Pain measured on a 0-10 scale with 0 being no pain and 10 being the worst pain imaginable.     Objective    Nilesh received therapeutic exercises to develop strength, endurance, ROM and flexibility for 45 minutes including:   Quad set towel roll x 30 with 3 second hold  Quad set flat x 30 with 3 second hold  SAQ over 6 inch bolster x 30   Bridge with iso hip abd with Y loop 2 x 10  Clamshell 2 x 10 Yloop  Upright bike x 7 minutes level 2 seat #7  Shuttle double leg press 3.5 bands 2 x 10   Shuttle 2-->1 3.5 bands 2 x 10 B  Front lunge x 3 - stopped increased pain to L anterior knee  Forward step up - stopped increased pain to L anterior knee  Lateral step up - stopped increased pain to L anterior knee  Push push - modified for anterior ilial rotation - performed as pain increased to lumbar spine    Pt received manual therapy to L knee for joint mobilization into superior and inferior glides.     Dry needling performed by Maye Reich PT. See her note for documentation.    Written Home Exercises Provided: continued with push pull   Pt demo good understanding of the education  servando Galloway demonstrated good return demonstration of activities.     Assessment:   Pt progressed with exercises in clinic with minimal increase of pain to L knee and none lasting. He was unable to perform step up or lunge due to increased anterior knee pain along incision sites.   Pt will continue to benefit from skilled PT intervention. Medical Necessity is demonstrated by:  Pain limits function of effected part for some activities, Unable to participate fully in daily activities, Requires skilled supervision to complete and progress HEP, Weakness and decreased ROM.    Patient is making good progress towards established goals.    New/Revised goals: none   Short Term Goals:  4 weeks  1. Pt will present with increased L knee AROM flexion by 10 degrees for increased ease with ADLs  2. Pt will present with increased L knee ext to 0 degrees for improved heel strike with ambulation  3. Pt will participate in L knee MMT  4. Pt will present with increased hip MMT to 4+/5 B to decrease stress to L knee.   5. Pt will present with normalized gait pattern without AD.      Long Term Goals: 6-8 weeks  1. Pt will present with increased L knee AROM flexion to equal R.  2. Pt will present with L knee ext equal to R.   3. Pt will present with L knee MMT to 5/5   4. Pt will present with increased hip MMT to 5/5 B to decrease stress to L knee.   5. Pt will be independent with HEP and self management of symptoms.     Plan:  Continue with established Plan of Care towards PT goals.

## 2018-09-18 NOTE — PROGRESS NOTES
Name: Nilesh Sanchez  Clinic Number: [id number]  Diagnosis:   Encounter Diagnoses   Name Primary?    Decreased strength of lower extremity     Balance problem      Physician: Heather Purdy PA-C  Treatment Orders: PT Eval and Treat  Past Medical History:   Diagnosis Date    Allergic rhinosinusitis     Anxiety     Back pain     with spasms    Diverticulitis     s/p partial colectomy    GERD (gastroesophageal reflux disease)     Hyperlipidemia     Hypertension        Precautions: s/p lumbar fusion December 2017, s/p knee scope L August 2018      Subjective     Pt reports: feeling better,     Pain Scale: before treatment: 2 currently; after treatment: 1    Objective     Time in: 4:50  Time out: 5:00    AR R pelvis    TREATMENT  Worked with pt on modified push/pull and realigned and able to note+ change in symptoms     Manual therapy: Nilesh  received the following manual therapy techniques x 10 min. were applied to the: R gluteal/piriformis and homeostatic point R LE  to include: Dry needling with trigger point/manual therapy techniques was performed as per dry needle flow sheetR gluteal/piriformis and homeostatic point R LE .   Patient gave verbal consent to undergo dry needling.  All needles were removed and changes in signs and symptoms were noted in dry needle flow sheet decreased pain to 1.  Dry needling was performed to decrease inflammation, increase circulation, decrease pain and restore homeostasis.       Pt demo good understanding of the education provided.   Pt. education:  · No spiritual or educational barriers to learning provided  · Pt has no cultural, educational or language barriers to learning provided.    Assessment     Patient responded well to dry needling as per dry needle treatment form with decreased pain to 1, sural point was not tender today.    Pt will continue to benefit from skilled outpatient physical therapy to address the remaining functional deficits, provide pt/family  education, and to maximize pt's level of independence in the home and community environment. .       Anticipated barriers to physical therapy: none  Pt's spiritual, cultural and educational needs considered and pt agreeable to plan of care and goals        Plan   Continue with established Plan of Care towards PT goals with continuation of dry needling as indicated and recommended by treating PT.    Laura Reich, MS, PT

## 2018-09-21 ENCOUNTER — CLINICAL SUPPORT (OUTPATIENT)
Dept: REHABILITATION | Facility: HOSPITAL | Age: 63
End: 2018-09-21
Attending: PHYSICIAN ASSISTANT
Payer: COMMERCIAL

## 2018-09-21 DIAGNOSIS — R26.89 BALANCE PROBLEM: ICD-10-CM

## 2018-09-21 DIAGNOSIS — R29.898 DECREASED STRENGTH OF LOWER EXTREMITY: ICD-10-CM

## 2018-09-21 PROCEDURE — 97110 THERAPEUTIC EXERCISES: CPT | Mod: PN

## 2018-09-21 NOTE — PATIENT INSTRUCTIONS
FUNCTIONAL MOBILITY: Step Up / Step Down        Step up, leading with left leg. Step down, leading with left leg.  __10_ reps per set, __3_ sets per day, __4_ days per week Repeat leading with other leg. Hold onto a support.    Copyright © I. All rights reserved.      Lateral Step Up        Stand to side of step. Step up leading with left leg. Slowly step down leading with opposite leg.  Perform __10_ reps. 3 sets.    Copyright © I. All rights reserved.       Bridging - raising buttocks up toward ceiling    Clamshells- lying on side with knees bent    Quad set with and without towel - pressing knee down to tighten quad

## 2018-09-21 NOTE — PROGRESS NOTES
[facility] Outpatient Physical Therapy Daily Note    Name: Nilesh Sanchez  Clinic Number: [id number]  Date of Treatment: 9/21/2018   Diagnosis:   Encounter Diagnoses   Name Primary?    Decreased strength of lower extremity     Balance problem      Visit number: 7  Start date: 8/23/18  POC End date: 10/18/18    Time in: 7:00 am  Time Out: 8:00 am  Total Treatment Time: 50    Precautions: s/p L meniscectomy performed by Dr. Bansal on 8/22/18    Subjective:    Phil reports he went on a work trip where he drove 5 hours and stood for 8 hours (with approx 2 sitting breaks) and he did not have any increase in symptoms. Patient reports their pain to be 0/10 currently to L knee. Pain measured on a 0-10 scale with 0 being no pain and 10 being the worst pain imaginable. He returns to Dr. Bansal in two weeks on 10/2. He is ready for DC at this time.     Objective      Knee Left Right   Flexion 137 140   Extention -4 +2   *Tested with patient supine    Knee Strength Left Right   Flexion 5/5 5/5   Extension 5/5 5/5     Hip MMT  Abd 5/5 B    Joint Mobility: normalized joint mobility B to patellofemoral  Palpation: TTP along incisions with moderate to deep pressure  Sensation: intact to light touch    Edema:  Left: very minimal  Right: absent    Gait: Laura ambulated 140 feet with auxillary crutches.  Level of Assistance: independent  Patient displays antalgic gait.   Balance: 8 seconds R LE; 3 second L LE - previous drop foot on L LE.     Nilesh received therapeutic exercises to develop strength, endurance, ROM and flexibility for 45 minutes including:   Upright bike x 7 minutes level 2 seat #7  Gastroc stretch 3 x 30 seconds  Forward step up - x 20  Lateral step up - x 20   Shuttle double leg press 3.5 bands 2 x 10   Quad set flat x 30 with 3 second hold  Bridge with iso hip abd with Y loop 2 x 10  Clamshell 2 x 10 Yloop    Written Home Exercises Provided: fwd and lateral step up. Continue with quad  sets, clamshells, and bridge  Pt demo good understanding of the education provided. Nilesh demonstrated good return demonstration of activities.     CMS Impairment/Limitation/Restriction for FOTO Knee Survey  Status Limitation G-Code CMS Severity Modifier  Intake 33% 67%  Predicted 54% 46% Goal Status+ CK - At least 40 percent but less than 60 percent  9/21/2018 64% 36% Current Status CJ - At least 20 percent but less than 40 percent    Assessment:   Pt has met all goals at this time. His patellar mobility is normalized and his quad contraction is excellent. He demonstrates altered gait pattern due to drop foot on L previously noted prior to surgery.     Patient has made good progress towards established goals.    Short Term Goals:  4 weeks  1. Pt will present with increased L knee AROM flexion by 10 degrees for increased ease with ADLs - met  2. Pt will present with increased L knee ext to 0 degrees for improved heel strike with ambulation - met  3. Pt will participate in L knee MMT - met  4. Pt will present with increased hip MMT to 4+/5 B to decrease stress to L knee. - met   5. Pt will present with normalized gait pattern without AD. - comparable to gait prior to surgery, pt has drop foot on L.      Long Term Goals: 6-8 weeks  1. Pt will present with increased L knee AROM flexion to equal R. - 3 degrees less  2. Pt will present with L knee ext equal to R. - met  3. Pt will present with L knee MMT to 5/5 - met  4. Pt will present with increased hip MMT to 5/5 B to decrease stress to L knee. - met   5. Pt will be independent with HEP and self management of symptoms. - met    Plan:  Pt is DC to HEP today.

## 2018-09-26 RX ORDER — TESTOSTERONE 50 MG/5G
GEL TRANSDERMAL
Qty: 150 G | Refills: 0 | OUTPATIENT
Start: 2018-09-26

## 2018-10-02 ENCOUNTER — TELEPHONE (OUTPATIENT)
Dept: UROLOGY | Facility: CLINIC | Age: 63
End: 2018-10-02

## 2018-10-02 ENCOUNTER — PATIENT MESSAGE (OUTPATIENT)
Dept: UROLOGY | Facility: CLINIC | Age: 63
End: 2018-10-02

## 2018-10-02 ENCOUNTER — OFFICE VISIT (OUTPATIENT)
Dept: SPORTS MEDICINE | Facility: CLINIC | Age: 63
End: 2018-10-02
Payer: COMMERCIAL

## 2018-10-02 VITALS
SYSTOLIC BLOOD PRESSURE: 126 MMHG | BODY MASS INDEX: 36.31 KG/M2 | WEIGHT: 274 LBS | HEART RATE: 76 BPM | HEIGHT: 73 IN | DIASTOLIC BLOOD PRESSURE: 80 MMHG

## 2018-10-02 DIAGNOSIS — Z98.890 POST-OPERATIVE STATE: Primary | ICD-10-CM

## 2018-10-02 DIAGNOSIS — Z98.890 S/P MEDIAL MENISCECTOMY OF LEFT KNEE: ICD-10-CM

## 2018-10-02 PROCEDURE — 99999 PR PBB SHADOW E&M-EST. PATIENT-LVL III: CPT | Mod: PBBFAC,,, | Performed by: ORTHOPAEDIC SURGERY

## 2018-10-02 PROCEDURE — 99024 POSTOP FOLLOW-UP VISIT: CPT | Mod: S$GLB,,, | Performed by: ORTHOPAEDIC SURGERY

## 2018-10-02 NOTE — PROGRESS NOTES
"CC: Left knee scope post op 6 weeks.  No issues.  Has been discharged from physical therapy.    DATE OF PROCEDURE: 8/22/2018     PREOPERATIVE DIAGNOSES:   1. Left knee medial meniscus tear.   2. Left knee chondromalacia     POSTOPERATIVE DIAGNOSES:   1. Left knee medial meniscus tear.   2. Left knee chondromalacia     PROCEDURES:   1. Left knee arthroscopic partial medial meniscectomy  2. Left knee arthroscopic chondroplasty     SURGEON: Buster Bansal M.D.     PE:    /80   Pulse 76   Ht 6' 1" (1.854 m)   Wt 124.3 kg (274 lb)   BMI 36.15 kg/m²      Left knee:    Incision clean/dry/intact  No sign of infection  Mild swelling  Compartments soft  Neurovascular status intact in extremity    FROM  Good quad strength  No effusion  No tenderness over medial or lateral joint lines    ROM: 0-135    Assessment:  6 weeks s/p left knee scope    Plan:    1.  Transition from physical therapy to home exercise program for quad, VMO, hip abductor strengthening.    2.  Off of pain medication.    3.  Return to clinic as needed for knee.            "

## 2018-10-02 NOTE — TELEPHONE ENCOUNTER
Message left for pt on voicemail. Needs appt prior to refill of medication, needs labs done before appt. Replied to pts rx request also.

## 2018-10-09 ENCOUNTER — PATIENT MESSAGE (OUTPATIENT)
Dept: UROLOGY | Facility: CLINIC | Age: 63
End: 2018-10-09

## 2018-10-16 ENCOUNTER — LAB VISIT (OUTPATIENT)
Dept: LAB | Facility: HOSPITAL | Age: 63
End: 2018-10-16
Attending: UROLOGY
Payer: COMMERCIAL

## 2018-10-16 DIAGNOSIS — E29.1 HYPOGONADISM IN MALE: ICD-10-CM

## 2018-10-16 LAB
ALBUMIN SERPL BCP-MCNC: 3.8 G/DL
ALP SERPL-CCNC: 110 U/L
ALT SERPL W/O P-5'-P-CCNC: 29 U/L
AST SERPL-CCNC: 24 U/L
BASOPHILS # BLD AUTO: 0.06 K/UL
BASOPHILS NFR BLD: 0.8 %
BILIRUB DIRECT SERPL-MCNC: 0.2 MG/DL
BILIRUB SERPL-MCNC: 0.4 MG/DL
CHOLEST SERPL-MCNC: 244 MG/DL
CHOLEST/HDLC SERPL: 5.2 {RATIO}
COMPLEXED PSA SERPL-MCNC: 1.3 NG/ML
DIFFERENTIAL METHOD: ABNORMAL
EOSINOPHIL # BLD AUTO: 0.1 K/UL
EOSINOPHIL NFR BLD: 1.7 %
ERYTHROCYTE [DISTWIDTH] IN BLOOD BY AUTOMATED COUNT: 13.2 %
HCT VFR BLD AUTO: 43.8 %
HDLC SERPL-MCNC: 47 MG/DL
HDLC SERPL: 19.3 %
HGB BLD-MCNC: 13.4 G/DL
IMM GRANULOCYTES # BLD AUTO: 0.01 K/UL
IMM GRANULOCYTES NFR BLD AUTO: 0.1 %
LDLC SERPL CALC-MCNC: 132.6 MG/DL
LYMPHOCYTES # BLD AUTO: 2.2 K/UL
LYMPHOCYTES NFR BLD: 29.9 %
MCH RBC QN AUTO: 30.2 PG
MCHC RBC AUTO-ENTMCNC: 30.6 G/DL
MCV RBC AUTO: 99 FL
MONOCYTES # BLD AUTO: 0.5 K/UL
MONOCYTES NFR BLD: 7.5 %
NEUTROPHILS # BLD AUTO: 4.3 K/UL
NEUTROPHILS NFR BLD: 60 %
NONHDLC SERPL-MCNC: 197 MG/DL
NRBC BLD-RTO: 0 /100 WBC
PLATELET # BLD AUTO: 263 K/UL
PMV BLD AUTO: 10.7 FL
PROT SERPL-MCNC: 7.1 G/DL
RBC # BLD AUTO: 4.44 M/UL
TESTOST SERPL-MCNC: 68 NG/DL
TRIGL SERPL-MCNC: 322 MG/DL
WBC # BLD AUTO: 7.18 K/UL

## 2018-10-16 PROCEDURE — 36415 COLL VENOUS BLD VENIPUNCTURE: CPT | Mod: PN

## 2018-10-16 PROCEDURE — 80061 LIPID PANEL: CPT

## 2018-10-16 PROCEDURE — 84403 ASSAY OF TOTAL TESTOSTERONE: CPT

## 2018-10-16 PROCEDURE — 80076 HEPATIC FUNCTION PANEL: CPT

## 2018-10-16 PROCEDURE — 85025 COMPLETE CBC W/AUTO DIFF WBC: CPT

## 2018-10-16 PROCEDURE — 84153 ASSAY OF PSA TOTAL: CPT

## 2018-10-17 ENCOUNTER — TELEPHONE (OUTPATIENT)
Dept: UROLOGY | Facility: CLINIC | Age: 63
End: 2018-10-17

## 2018-10-17 NOTE — TELEPHONE ENCOUNTER
Has the below been addressed?  Please document and close.    Please notify his lipids are high.  He should see his PCP regarding this.

## 2018-10-18 ENCOUNTER — OFFICE VISIT (OUTPATIENT)
Dept: UROLOGY | Facility: CLINIC | Age: 63
End: 2018-10-18
Payer: COMMERCIAL

## 2018-10-18 VITALS — BODY MASS INDEX: 36.46 KG/M2 | WEIGHT: 275.13 LBS | HEIGHT: 73 IN

## 2018-10-18 DIAGNOSIS — R79.89 LOW TESTOSTERONE LEVEL IN MALE: Primary | ICD-10-CM

## 2018-10-18 PROCEDURE — 3008F BODY MASS INDEX DOCD: CPT | Mod: CPTII,S$GLB,, | Performed by: UROLOGY

## 2018-10-18 PROCEDURE — 1101F PT FALLS ASSESS-DOCD LE1/YR: CPT | Mod: CPTII,S$GLB,, | Performed by: UROLOGY

## 2018-10-18 PROCEDURE — 99999 PR PBB SHADOW E&M-EST. PATIENT-LVL III: CPT | Mod: PBBFAC,,, | Performed by: UROLOGY

## 2018-10-18 PROCEDURE — 99213 OFFICE O/P EST LOW 20 MIN: CPT | Mod: S$GLB,,, | Performed by: UROLOGY

## 2018-10-18 RX ORDER — TESTOSTERONE 50 MG/5G
GEL TRANSDERMAL
Refills: 5 | COMMUNITY
Start: 2018-08-14 | End: 2019-04-10

## 2018-10-18 NOTE — PROGRESS NOTES
Subjective:       Patient ID: Nilesh Sanchez is a 63 y.o. male.    Chief Complaint: Medication Refill    HPI     63 year old with a history of low testosterone.  He has been on IM testosterone replacement for almost 10 years. (Dr. Andrews)  Initially he had fatigue and the testosterone seemed to improve his energy.  Now using Testim for the last 6 month.  Testosterone level on therapy is 308.   Off therapy was 68.  He is unsure if his symptoms are any different while using the cream.  We discussed options.  We will hold all testosterone therapy for 6 months.He has no bothersome LUTS.    Component PSA    Latest Ref Rng & Units 0.00 - 4.00 ng/mL   10/16/2018 1.3   2/15/2018 0.64   8/12/2016 0.9       Past Medical History:   Diagnosis Date    Allergic rhinosinusitis     Anxiety     Back pain     with spasms    Diverticulitis     s/p partial colectomy    GERD (gastroesophageal reflux disease)     Hyperlipidemia     Hypertension      Past Surgical History:   Procedure Laterality Date    ARTHROSCOPIC CHONDROPLASTY OF KNEE JOINT Left 8/22/2018    Procedure: ARTHROSCOPY, KNEE, WITH CHONDROPLASTY;  Surgeon: Buster Bansal MD;  Location: [facility];  Service: Orthopedics;  Laterality: Left;    ARTHROSCOPY, KNEE, WITH CHONDROPLASTY Left 8/22/2018    [facility] OR    ARTHROSCOPY, KNEE, WITH MENISCECTOMY PARTIAL MEDIAL Left 8/22/2018    Per[facility] by [facility]al [facility] at [facility] OR    COLECTOMY      18in of sigmoid colon removed    COLONOSCOPY      DISKECTOMY-LAMINECTOMY-LUMBAR L4/5 Bilateral 12/7/2017    [facility] 2ND FLR    ESOPHAGOGASTRODUODENOSCOPY      KNEE ARTHROSCOPY W/ MENISCECTOMY Left 8/22/2018    Procedure: ARTHROSCOPY, KNEE, WITH MENISCECTOMY PARTIAL MEDIAL;  Surgeon: Buster Bansal MD;  Location: [facility] OR;  Service: Orthopedics;  Laterality: Left;  femoral     LUMBAR DISC SURGERY  12/07/2017    L4/5    UPPER  GASTROINTESTINAL ENDOSCOPY         Current Outpatient Medications:     atorvastatin (LIPITOR) 40 MG tablet, Take 1 tablet (40 mg total) by mouth nightly., Disp: 90 tablet, Rfl: 2    fexofenadine (ALLEGRA) 180 MG tablet, Take 1 tablet (180 mg total) by mouth once daily., Disp: 90 tablet, Rfl: 1    lisinopril-hydrochlorothiazide (PRINZIDE,ZESTORETIC) 20-25 mg Tab, Take 1 tablet by mouth once daily., Disp: 90 tablet, Rfl: 2    metoprolol succinate (TOPROL-XL) 50 MG 24 hr tablet, Take 1 tablet (50 mg total) by mouth every evening., Disp: 90 tablet, Rfl: 2    pantoprazole (PROTONIX) 40 MG tablet, Take 1 tablet (40 mg total) by mouth once daily., Disp: 90 tablet, Rfl: 2    sertraline (ZOLOFT) 100 MG tablet, Take 1 tablet (100 mg total) by mouth once daily., Disp: 90 tablet, Rfl: 2    TESTIM 50 mg/5 gram (1 %) Gel, APPLY 5 GRAMS TO SHOULDERS DAILY, Disp: , Rfl: 5      Review of Systems   Constitutional: Negative for fever.   Genitourinary: Negative for dysuria and hematuria.       Objective:      Physical Exam   Constitutional: He is oriented to person, place, and time. He appears well-developed and well-nourished.   Pulmonary/Chest: Effort normal.   Neurological: He is alert and oriented to person, place, and time.   Skin: No rash noted.   Psychiatric: He has a normal mood and affect.   Vitals reviewed.      Assessment:       1. Low testosterone level in male        Plan:       Low testosterone level in male  -     Testosterone; Future; Expected date: 04/20/2019

## 2018-10-19 DIAGNOSIS — F41.9 CHRONIC ANXIETY: ICD-10-CM

## 2018-10-19 DIAGNOSIS — E78.2 MIXED HYPERLIPIDEMIA: ICD-10-CM

## 2018-10-19 DIAGNOSIS — K21.9 GASTROESOPHAGEAL REFLUX DISEASE, ESOPHAGITIS PRESENCE NOT SPECIFIED: ICD-10-CM

## 2018-10-19 DIAGNOSIS — I10 ESSENTIAL HYPERTENSION: ICD-10-CM

## 2018-10-22 RX ORDER — PANTOPRAZOLE SODIUM 40 MG/1
TABLET, DELAYED RELEASE ORAL
Qty: 90 TABLET | Refills: 2 | Status: SHIPPED | OUTPATIENT
Start: 2018-10-22 | End: 2019-02-26 | Stop reason: SDUPTHER

## 2018-10-22 RX ORDER — LISINOPRIL AND HYDROCHLOROTHIAZIDE 20; 25 MG/1; MG/1
1 TABLET ORAL DAILY
Qty: 90 TABLET | Refills: 2 | Status: SHIPPED | OUTPATIENT
Start: 2018-10-22 | End: 2019-02-26 | Stop reason: SDUPTHER

## 2018-10-22 RX ORDER — METOPROLOL SUCCINATE 50 MG/1
TABLET, EXTENDED RELEASE ORAL
Qty: 90 TABLET | Refills: 2 | Status: SHIPPED | OUTPATIENT
Start: 2018-10-22 | End: 2019-02-26 | Stop reason: SDUPTHER

## 2018-10-22 RX ORDER — SERTRALINE HYDROCHLORIDE 100 MG/1
TABLET, FILM COATED ORAL
Qty: 90 TABLET | Refills: 2 | Status: SHIPPED | OUTPATIENT
Start: 2018-10-22 | End: 2019-02-26 | Stop reason: SDUPTHER

## 2018-10-22 RX ORDER — ATORVASTATIN CALCIUM 40 MG/1
TABLET, FILM COATED ORAL
Qty: 90 TABLET | Refills: 2 | Status: SHIPPED | OUTPATIENT
Start: 2018-10-22 | End: 2019-02-26 | Stop reason: SDUPTHER

## 2018-12-04 ENCOUNTER — PATIENT MESSAGE (OUTPATIENT)
Dept: SPORTS MEDICINE | Facility: CLINIC | Age: 63
End: 2018-12-04

## 2019-02-26 ENCOUNTER — PATIENT MESSAGE (OUTPATIENT)
Dept: FAMILY MEDICINE | Facility: CLINIC | Age: 64
End: 2019-02-26

## 2019-02-26 DIAGNOSIS — E78.2 MIXED HYPERLIPIDEMIA: ICD-10-CM

## 2019-02-26 DIAGNOSIS — K21.9 GASTROESOPHAGEAL REFLUX DISEASE, ESOPHAGITIS PRESENCE NOT SPECIFIED: ICD-10-CM

## 2019-02-26 DIAGNOSIS — I10 ESSENTIAL HYPERTENSION: ICD-10-CM

## 2019-02-26 DIAGNOSIS — F41.9 CHRONIC ANXIETY: ICD-10-CM

## 2019-02-26 RX ORDER — METOPROLOL SUCCINATE 50 MG/1
50 TABLET, EXTENDED RELEASE ORAL NIGHTLY
Qty: 90 TABLET | Refills: 0 | Status: SHIPPED | OUTPATIENT
Start: 2019-02-26 | End: 2019-04-10 | Stop reason: SDUPTHER

## 2019-02-26 RX ORDER — ATORVASTATIN CALCIUM 40 MG/1
40 TABLET, FILM COATED ORAL NIGHTLY
Qty: 90 TABLET | Refills: 0 | Status: SHIPPED | OUTPATIENT
Start: 2019-02-26 | End: 2019-04-10 | Stop reason: SDUPTHER

## 2019-02-26 RX ORDER — PANTOPRAZOLE SODIUM 40 MG/1
40 TABLET, DELAYED RELEASE ORAL DAILY
Qty: 90 TABLET | Refills: 0 | Status: SHIPPED | OUTPATIENT
Start: 2019-02-26 | End: 2019-04-10 | Stop reason: SDUPTHER

## 2019-02-26 RX ORDER — SERTRALINE HYDROCHLORIDE 100 MG/1
100 TABLET, FILM COATED ORAL DAILY
Qty: 90 TABLET | Refills: 0 | Status: SHIPPED | OUTPATIENT
Start: 2019-02-26 | End: 2019-04-10 | Stop reason: SDUPTHER

## 2019-02-26 RX ORDER — LISINOPRIL AND HYDROCHLOROTHIAZIDE 20; 25 MG/1; MG/1
1 TABLET ORAL DAILY
Qty: 90 TABLET | Refills: 0 | Status: SHIPPED | OUTPATIENT
Start: 2019-02-26 | End: 2019-04-10 | Stop reason: SDUPTHER

## 2019-04-10 ENCOUNTER — OFFICE VISIT (OUTPATIENT)
Dept: FAMILY MEDICINE | Facility: CLINIC | Age: 64
End: 2019-04-10
Payer: COMMERCIAL

## 2019-04-10 ENCOUNTER — LAB VISIT (OUTPATIENT)
Dept: LAB | Facility: HOSPITAL | Age: 64
End: 2019-04-10
Attending: INTERNAL MEDICINE
Payer: COMMERCIAL

## 2019-04-10 VITALS
HEART RATE: 64 BPM | DIASTOLIC BLOOD PRESSURE: 84 MMHG | BODY MASS INDEX: 36.2 KG/M2 | WEIGHT: 273.13 LBS | TEMPERATURE: 98 F | SYSTOLIC BLOOD PRESSURE: 132 MMHG | HEIGHT: 73 IN

## 2019-04-10 DIAGNOSIS — E29.1 HYPOGONADISM IN MALE: ICD-10-CM

## 2019-04-10 DIAGNOSIS — I10 ESSENTIAL HYPERTENSION: ICD-10-CM

## 2019-04-10 DIAGNOSIS — R05.9 COUGH: ICD-10-CM

## 2019-04-10 DIAGNOSIS — R73.03 PREDIABETES: ICD-10-CM

## 2019-04-10 DIAGNOSIS — E78.2 MIXED HYPERLIPIDEMIA: ICD-10-CM

## 2019-04-10 DIAGNOSIS — K21.9 GASTROESOPHAGEAL REFLUX DISEASE, ESOPHAGITIS PRESENCE NOT SPECIFIED: ICD-10-CM

## 2019-04-10 DIAGNOSIS — F41.9 CHRONIC ANXIETY: ICD-10-CM

## 2019-04-10 DIAGNOSIS — H10.10 ALLERGIC RHINOCONJUNCTIVITIS: Primary | ICD-10-CM

## 2019-04-10 DIAGNOSIS — J30.9 ALLERGIC RHINOCONJUNCTIVITIS: Primary | ICD-10-CM

## 2019-04-10 LAB
ALBUMIN SERPL BCP-MCNC: 3.7 G/DL (ref 3.5–5.2)
ALP SERPL-CCNC: 105 U/L (ref 55–135)
ALT SERPL W/O P-5'-P-CCNC: 36 U/L (ref 10–44)
ANION GAP SERPL CALC-SCNC: 8 MMOL/L (ref 8–16)
AST SERPL-CCNC: 31 U/L (ref 10–40)
BILIRUB SERPL-MCNC: 0.5 MG/DL (ref 0.1–1)
BUN SERPL-MCNC: 21 MG/DL (ref 8–23)
CALCIUM SERPL-MCNC: 9.6 MG/DL (ref 8.7–10.5)
CHLORIDE SERPL-SCNC: 100 MMOL/L (ref 95–110)
CHOLEST SERPL-MCNC: 208 MG/DL (ref 120–199)
CHOLEST/HDLC SERPL: 4.3 {RATIO} (ref 2–5)
CO2 SERPL-SCNC: 32 MMOL/L (ref 23–29)
CREAT SERPL-MCNC: 0.9 MG/DL (ref 0.5–1.4)
EST. GFR  (AFRICAN AMERICAN): >60 ML/MIN/1.73 M^2
EST. GFR  (NON AFRICAN AMERICAN): >60 ML/MIN/1.73 M^2
ESTIMATED AVG GLUCOSE: 126 MG/DL (ref 68–131)
GLUCOSE SERPL-MCNC: 108 MG/DL (ref 70–110)
HBA1C MFR BLD HPLC: 6 % (ref 4–5.6)
HDLC SERPL-MCNC: 48 MG/DL (ref 40–75)
HDLC SERPL: 23.1 % (ref 20–50)
LDLC SERPL CALC-MCNC: 117.8 MG/DL (ref 63–159)
NONHDLC SERPL-MCNC: 160 MG/DL
POTASSIUM SERPL-SCNC: 4.4 MMOL/L (ref 3.5–5.1)
PROT SERPL-MCNC: 7.1 G/DL (ref 6–8.4)
SODIUM SERPL-SCNC: 140 MMOL/L (ref 136–145)
TESTOST SERPL-MCNC: 122 NG/DL (ref 304–1227)
TRIGL SERPL-MCNC: 211 MG/DL (ref 30–150)

## 2019-04-10 PROCEDURE — 80061 LIPID PANEL: CPT

## 2019-04-10 PROCEDURE — 3075F SYST BP GE 130 - 139MM HG: CPT | Mod: CPTII,S$GLB,, | Performed by: INTERNAL MEDICINE

## 2019-04-10 PROCEDURE — 99999 PR PBB SHADOW E&M-EST. PATIENT-LVL III: ICD-10-PCS | Mod: PBBFAC,,, | Performed by: INTERNAL MEDICINE

## 2019-04-10 PROCEDURE — 84403 ASSAY OF TOTAL TESTOSTERONE: CPT

## 2019-04-10 PROCEDURE — 3075F PR MOST RECENT SYSTOLIC BLOOD PRESS GE 130-139MM HG: ICD-10-PCS | Mod: CPTII,S$GLB,, | Performed by: INTERNAL MEDICINE

## 2019-04-10 PROCEDURE — 83036 HEMOGLOBIN GLYCOSYLATED A1C: CPT

## 2019-04-10 PROCEDURE — 3079F PR MOST RECENT DIASTOLIC BLOOD PRESSURE 80-89 MM HG: ICD-10-PCS | Mod: CPTII,S$GLB,, | Performed by: INTERNAL MEDICINE

## 2019-04-10 PROCEDURE — 99999 PR PBB SHADOW E&M-EST. PATIENT-LVL III: CPT | Mod: PBBFAC,,, | Performed by: INTERNAL MEDICINE

## 2019-04-10 PROCEDURE — 99214 PR OFFICE/OUTPT VISIT, EST, LEVL IV, 30-39 MIN: ICD-10-PCS | Mod: S$GLB,,, | Performed by: INTERNAL MEDICINE

## 2019-04-10 PROCEDURE — 3008F BODY MASS INDEX DOCD: CPT | Mod: CPTII,S$GLB,, | Performed by: INTERNAL MEDICINE

## 2019-04-10 PROCEDURE — 99214 OFFICE O/P EST MOD 30 MIN: CPT | Mod: S$GLB,,, | Performed by: INTERNAL MEDICINE

## 2019-04-10 PROCEDURE — 3008F PR BODY MASS INDEX (BMI) DOCUMENTED: ICD-10-PCS | Mod: CPTII,S$GLB,, | Performed by: INTERNAL MEDICINE

## 2019-04-10 PROCEDURE — 1101F PR PT FALLS ASSESS DOC 0-1 FALLS W/OUT INJ PAST YR: ICD-10-PCS | Mod: CPTII,S$GLB,, | Performed by: INTERNAL MEDICINE

## 2019-04-10 PROCEDURE — 80053 COMPREHEN METABOLIC PANEL: CPT

## 2019-04-10 PROCEDURE — 36415 COLL VENOUS BLD VENIPUNCTURE: CPT | Mod: PN

## 2019-04-10 PROCEDURE — 1101F PT FALLS ASSESS-DOCD LE1/YR: CPT | Mod: CPTII,S$GLB,, | Performed by: INTERNAL MEDICINE

## 2019-04-10 PROCEDURE — 3079F DIAST BP 80-89 MM HG: CPT | Mod: CPTII,S$GLB,, | Performed by: INTERNAL MEDICINE

## 2019-04-10 RX ORDER — PANTOPRAZOLE SODIUM 40 MG/1
40 TABLET, DELAYED RELEASE ORAL DAILY
Qty: 90 TABLET | Refills: 0 | Status: SHIPPED | OUTPATIENT
Start: 2019-04-10 | End: 2019-07-04 | Stop reason: SDUPTHER

## 2019-04-10 RX ORDER — BENZONATATE 100 MG/1
100 CAPSULE ORAL 3 TIMES DAILY PRN
Qty: 30 CAPSULE | Refills: 0 | Status: SHIPPED | OUTPATIENT
Start: 2019-04-10 | End: 2019-04-20

## 2019-04-10 RX ORDER — METOPROLOL SUCCINATE 50 MG/1
50 TABLET, EXTENDED RELEASE ORAL NIGHTLY
Qty: 90 TABLET | Refills: 0 | Status: SHIPPED | OUTPATIENT
Start: 2019-04-10 | End: 2019-07-04 | Stop reason: SDUPTHER

## 2019-04-10 RX ORDER — ATORVASTATIN CALCIUM 40 MG/1
40 TABLET, FILM COATED ORAL NIGHTLY
Qty: 90 TABLET | Refills: 0 | Status: SHIPPED | OUTPATIENT
Start: 2019-04-10 | End: 2019-07-04 | Stop reason: SDUPTHER

## 2019-04-10 RX ORDER — LISINOPRIL AND HYDROCHLOROTHIAZIDE 20; 25 MG/1; MG/1
1 TABLET ORAL DAILY
Qty: 90 TABLET | Refills: 0 | Status: SHIPPED | OUTPATIENT
Start: 2019-04-10 | End: 2019-07-04 | Stop reason: SDUPTHER

## 2019-04-10 RX ORDER — AZELASTINE 1 MG/ML
1 SPRAY, METERED NASAL 2 TIMES DAILY
Qty: 30 ML | Refills: 1 | Status: SHIPPED | OUTPATIENT
Start: 2019-04-10 | End: 2019-10-10 | Stop reason: SDUPTHER

## 2019-04-10 RX ORDER — SERTRALINE HYDROCHLORIDE 100 MG/1
100 TABLET, FILM COATED ORAL DAILY
Qty: 90 TABLET | Refills: 0 | Status: SHIPPED | OUTPATIENT
Start: 2019-04-10 | End: 2019-07-04 | Stop reason: SDUPTHER

## 2019-04-10 NOTE — PROGRESS NOTES
Assessment and Plan:    1. Allergic rhinoconjunctivitis  Reviewed management of allergies with OTC medications, saline, and astelin.   - azelastine (ASTELIN) 137 mcg (0.1 %) nasal spray; 1 spray (137 mcg total) by Nasal route 2 (two) times daily.  Dispense: 30 mL; Refill: 1    2. Cough  - benzonatate (TESSALON) 100 MG capsule; Take 1 capsule (100 mg total) by mouth 3 (three) times daily as needed.  Dispense: 30 capsule; Refill: 0    3. Mixed hyperlipidemia  On statin, will continue. TG had been increasing, discussed diet.   - Lipid panel; Future  - atorvastatin (LIPITOR) 40 MG tablet; Take 1 tablet (40 mg total) by mouth nightly.  Dispense: 90 tablet; Refill: 0    4. Essential hypertension  Controlled on current medications, update labs and continue current medications if labs WNL.   - Comprehensive metabolic panel; Future  - lisinopril-hydrochlorothiazide (PRINZIDE,ZESTORETIC) 20-25 mg Tab; Take 1 tablet by mouth once daily.  Dispense: 90 tablet; Refill: 0  - metoprolol succinate (TOPROL-XL) 50 MG 24 hr tablet; Take 1 tablet (50 mg total) by mouth every evening.  Dispense: 90 tablet; Refill: 0    5. Chronic anxiety  Doing well, continue sertraline.   - sertraline (ZOLOFT) 100 MG tablet; Take 1 tablet (100 mg total) by mouth once daily.  Dispense: 90 tablet; Refill: 0    6. Hypogonadism in male  - Testosterone; Future    7. Prediabetes  - Hemoglobin A1c; Future    8. Gastroesophageal reflux disease, esophagitis presence not specified  - pantoprazole (PROTONIX) 40 MG tablet; Take 1 tablet (40 mg total) by mouth once daily.  Dispense: 90 tablet; Refill: 0    ______________________________________________________________________  Subjective:    Chief Complaint:  Nasal congestion, cough, watery eyes, chronic medical conditions    HPI:  Nilesh is a 63 y.o. year old man here to discuss symptoms including nasal congestion, cough, and watery eyes.     Today he reports that he has bad allergies at baseline. Over the weekend  "he had been outside clearing some brush and reports that he developed significant nasal congestion, cough, and significant watery eyes. He has felt some warmth but has not measured any fever. He has been using PO allegra, but he has not been using any nasal sprays. He has been using ketotifen eye drops. Has been coughing a lot which has been bothersome. Does feel like in the last day his symptoms have improved.     HTN- Takes lisinopril, HCTZ, and metoprolol for BP. Has historically been at goal on this. No problems with the medications, BP has been controlled.     HLD- Takes atorvastatin, no problems with this.    PreDM- A1c 5.8 in August, hypertriglyceridemia worsening. Notes that his diet has been "terrible" lately.     Chronic anxiety- Takes sertraline and doing well with this.     GERD- Taking PPI and not having symptoms at this time.     Hypogonadism- Has been off of testosterone for several months. Wanted to see if he has started producing testosterone on his own off of the medications.     Medications:  Current Outpatient Medications on File Prior to Visit   Medication Sig Dispense Refill    atorvastatin (LIPITOR) 40 MG tablet Take 1 tablet (40 mg total) by mouth nightly. 90 tablet 0    fexofenadine (ALLEGRA) 180 MG tablet Take 1 tablet (180 mg total) by mouth once daily. 90 tablet 1    lisinopril-hydrochlorothiazide (PRINZIDE,ZESTORETIC) 20-25 mg Tab Take 1 tablet by mouth once daily. 90 tablet 0    metoprolol succinate (TOPROL-XL) 50 MG 24 hr tablet Take 1 tablet (50 mg total) by mouth every evening. 90 tablet 0    pantoprazole (PROTONIX) 40 MG tablet Take 1 tablet (40 mg total) by mouth once daily. 90 tablet 0    sertraline (ZOLOFT) 100 MG tablet Take 1 tablet (100 mg total) by mouth once daily. 90 tablet 0    [DISCONTINUED] TESTIM 50 mg/5 gram (1 %) Gel APPLY 5 GRAMS TO SHOULDERS DAILY  5     No current facility-administered medications on file prior to visit.        Review of Systems:  Review of " "Systems   Constitutional: Positive for fever. Negative for chills and fatigue.   HENT: Positive for congestion, postnasal drip, rhinorrhea and sore throat. Negative for sinus pressure and trouble swallowing.    Respiratory: Positive for cough. Negative for shortness of breath and wheezing.    Cardiovascular: Positive for chest pain (only with coughing, no chest pressure or angina). Negative for palpitations and leg swelling.   Allergic/Immunologic: Positive for environmental allergies.   Neurological: Positive for headaches. Negative for dizziness and light-headedness.     Entered by patient and reviewed and updated during visit      Past Medical History:  Past Medical History:   Diagnosis Date    Allergic rhinosinusitis     Anxiety     Back pain     with spasms    Diverticulitis     s/p partial colectomy    GERD (gastroesophageal reflux disease)     Hyperlipidemia     Hypertension        Objective:    Vitals:  Vitals:    04/10/19 0732   BP: 132/84   Pulse: 64   Temp: 98.4 °F (36.9 °C)   Weight: 123.9 kg (273 lb 2.4 oz)   Height: 6' 1" (1.854 m)   PainSc: 0-No pain       Physical Exam   Constitutional: He is oriented to person, place, and time. He appears well-developed and well-nourished. No distress.   HENT:   Mouth/Throat: Oropharynx is clear and moist.   Eyes: Conjunctivae are normal.   Cardiovascular: Normal rate and regular rhythm.   Pulmonary/Chest: Effort normal. No respiratory distress.   Neurological: He is alert and oriented to person, place, and time.   Skin: Skin is warm and dry.   Psychiatric: He has a normal mood and affect. His behavior is normal.   Vitals reviewed.      Data:  Previous labs reviewed and pertinent for 5.8.      Flavia Lange MD  Internal Medicine  "

## 2019-07-04 DIAGNOSIS — E78.2 MIXED HYPERLIPIDEMIA: ICD-10-CM

## 2019-07-04 DIAGNOSIS — F41.9 CHRONIC ANXIETY: ICD-10-CM

## 2019-07-04 DIAGNOSIS — I10 ESSENTIAL HYPERTENSION: ICD-10-CM

## 2019-07-04 DIAGNOSIS — K21.9 GASTROESOPHAGEAL REFLUX DISEASE, ESOPHAGITIS PRESENCE NOT SPECIFIED: ICD-10-CM

## 2019-07-05 RX ORDER — LISINOPRIL AND HYDROCHLOROTHIAZIDE 20; 25 MG/1; MG/1
TABLET ORAL
Qty: 90 TABLET | Refills: 0 | Status: SHIPPED | OUTPATIENT
Start: 2019-07-05 | End: 2019-09-17 | Stop reason: SDUPTHER

## 2019-07-05 RX ORDER — ATORVASTATIN CALCIUM 40 MG/1
TABLET, FILM COATED ORAL
Qty: 90 TABLET | Refills: 0 | Status: SHIPPED | OUTPATIENT
Start: 2019-07-05 | End: 2019-09-17 | Stop reason: SDUPTHER

## 2019-07-05 RX ORDER — SERTRALINE HYDROCHLORIDE 100 MG/1
TABLET, FILM COATED ORAL
Qty: 90 TABLET | Refills: 0 | Status: SHIPPED | OUTPATIENT
Start: 2019-07-05 | End: 2019-09-17 | Stop reason: SDUPTHER

## 2019-07-05 RX ORDER — METOPROLOL SUCCINATE 50 MG/1
50 TABLET, EXTENDED RELEASE ORAL NIGHTLY
Qty: 90 TABLET | Refills: 0 | Status: SHIPPED | OUTPATIENT
Start: 2019-07-05 | End: 2019-09-17 | Stop reason: SDUPTHER

## 2019-07-05 RX ORDER — PANTOPRAZOLE SODIUM 40 MG/1
TABLET, DELAYED RELEASE ORAL
Qty: 90 TABLET | Refills: 0 | Status: SHIPPED | OUTPATIENT
Start: 2019-07-05 | End: 2019-09-17 | Stop reason: SDUPTHER

## 2019-09-17 DIAGNOSIS — R73.09 ELEVATED HEMOGLOBIN A1C: Primary | ICD-10-CM

## 2019-09-17 DIAGNOSIS — I10 ESSENTIAL HYPERTENSION: ICD-10-CM

## 2019-09-17 DIAGNOSIS — N13.8 BPH WITH URINARY OBSTRUCTION: ICD-10-CM

## 2019-09-17 DIAGNOSIS — N40.1 BPH WITH URINARY OBSTRUCTION: ICD-10-CM

## 2019-09-17 DIAGNOSIS — Z12.5 ENCOUNTER FOR SCREENING FOR MALIGNANT NEOPLASM OF PROSTATE: ICD-10-CM

## 2019-09-17 DIAGNOSIS — E78.2 MIXED HYPERLIPIDEMIA: ICD-10-CM

## 2019-09-17 DIAGNOSIS — K21.9 GASTROESOPHAGEAL REFLUX DISEASE, ESOPHAGITIS PRESENCE NOT SPECIFIED: ICD-10-CM

## 2019-09-17 DIAGNOSIS — F41.9 CHRONIC ANXIETY: ICD-10-CM

## 2019-09-17 DIAGNOSIS — E29.1 HYPOGONADISM IN MALE: ICD-10-CM

## 2019-09-17 RX ORDER — SERTRALINE HYDROCHLORIDE 100 MG/1
TABLET, FILM COATED ORAL
Qty: 90 TABLET | Refills: 0 | Status: SHIPPED | OUTPATIENT
Start: 2019-09-17 | End: 2019-10-10 | Stop reason: SDUPTHER

## 2019-09-17 RX ORDER — ATORVASTATIN CALCIUM 40 MG/1
TABLET, FILM COATED ORAL
Qty: 90 TABLET | Refills: 0 | Status: SHIPPED | OUTPATIENT
Start: 2019-09-17 | End: 2019-10-10 | Stop reason: SDUPTHER

## 2019-09-17 RX ORDER — LISINOPRIL AND HYDROCHLOROTHIAZIDE 20; 25 MG/1; MG/1
TABLET ORAL
Qty: 90 TABLET | Refills: 0 | Status: SHIPPED | OUTPATIENT
Start: 2019-09-17 | End: 2019-10-10 | Stop reason: SDUPTHER

## 2019-09-17 RX ORDER — METOPROLOL SUCCINATE 50 MG/1
50 TABLET, EXTENDED RELEASE ORAL NIGHTLY
Qty: 90 TABLET | Refills: 0 | Status: SHIPPED | OUTPATIENT
Start: 2019-09-17 | End: 2019-10-10 | Stop reason: SDUPTHER

## 2019-09-17 RX ORDER — PANTOPRAZOLE SODIUM 40 MG/1
TABLET, DELAYED RELEASE ORAL
Qty: 90 TABLET | Refills: 0 | Status: SHIPPED | OUTPATIENT
Start: 2019-09-17 | End: 2019-10-10 | Stop reason: SDUPTHER

## 2019-09-17 NOTE — TELEPHONE ENCOUNTER
Spoke with pt, advised on rx sent to the pharmacy, scheduled pt for a follow up, pt asking for a PSA lab   Please advise any labs needed

## 2019-09-26 ENCOUNTER — PATIENT OUTREACH (OUTPATIENT)
Dept: ADMINISTRATIVE | Facility: HOSPITAL | Age: 64
End: 2019-09-26

## 2019-10-03 ENCOUNTER — LAB VISIT (OUTPATIENT)
Dept: LAB | Facility: HOSPITAL | Age: 64
End: 2019-10-03
Attending: INTERNAL MEDICINE
Payer: COMMERCIAL

## 2019-10-03 DIAGNOSIS — E29.1 HYPOGONADISM IN MALE: ICD-10-CM

## 2019-10-03 DIAGNOSIS — N40.1 BPH WITH URINARY OBSTRUCTION: ICD-10-CM

## 2019-10-03 DIAGNOSIS — I10 ESSENTIAL HYPERTENSION: ICD-10-CM

## 2019-10-03 DIAGNOSIS — R73.09 ELEVATED HEMOGLOBIN A1C: ICD-10-CM

## 2019-10-03 DIAGNOSIS — Z12.5 ENCOUNTER FOR SCREENING FOR MALIGNANT NEOPLASM OF PROSTATE: ICD-10-CM

## 2019-10-03 DIAGNOSIS — N13.8 BPH WITH URINARY OBSTRUCTION: ICD-10-CM

## 2019-10-03 LAB
ALBUMIN SERPL BCP-MCNC: 3.8 G/DL (ref 3.5–5.2)
ALP SERPL-CCNC: 106 U/L (ref 55–135)
ALT SERPL W/O P-5'-P-CCNC: 32 U/L (ref 10–44)
ANION GAP SERPL CALC-SCNC: 10 MMOL/L (ref 8–16)
AST SERPL-CCNC: 17 U/L (ref 10–40)
BILIRUB SERPL-MCNC: 0.4 MG/DL (ref 0.1–1)
BUN SERPL-MCNC: 19 MG/DL (ref 8–23)
CALCIUM SERPL-MCNC: 9.3 MG/DL (ref 8.7–10.5)
CHLORIDE SERPL-SCNC: 104 MMOL/L (ref 95–110)
CO2 SERPL-SCNC: 29 MMOL/L (ref 23–29)
CREAT SERPL-MCNC: 1 MG/DL (ref 0.5–1.4)
EST. GFR  (AFRICAN AMERICAN): >60 ML/MIN/1.73 M^2
EST. GFR  (NON AFRICAN AMERICAN): >60 ML/MIN/1.73 M^2
ESTIMATED AVG GLUCOSE: 128 MG/DL (ref 68–131)
GLUCOSE SERPL-MCNC: 96 MG/DL (ref 70–110)
HBA1C MFR BLD HPLC: 6.1 % (ref 4–5.6)
POTASSIUM SERPL-SCNC: 5 MMOL/L (ref 3.5–5.1)
PROT SERPL-MCNC: 7 G/DL (ref 6–8.4)
SODIUM SERPL-SCNC: 143 MMOL/L (ref 136–145)

## 2019-10-03 PROCEDURE — 83036 HEMOGLOBIN GLYCOSYLATED A1C: CPT

## 2019-10-03 PROCEDURE — 36415 COLL VENOUS BLD VENIPUNCTURE: CPT | Mod: PN

## 2019-10-03 PROCEDURE — 80053 COMPREHEN METABOLIC PANEL: CPT

## 2019-10-03 PROCEDURE — 84153 ASSAY OF PSA TOTAL: CPT

## 2019-10-03 PROCEDURE — 84403 ASSAY OF TOTAL TESTOSTERONE: CPT

## 2019-10-04 LAB
COMPLEXED PSA SERPL-MCNC: 0.61 NG/ML (ref 0–4)
TESTOST SERPL-MCNC: 157 NG/DL (ref 304–1227)

## 2019-10-10 ENCOUNTER — OFFICE VISIT (OUTPATIENT)
Dept: FAMILY MEDICINE | Facility: CLINIC | Age: 64
End: 2019-10-10
Payer: MEDICARE

## 2019-10-10 ENCOUNTER — OFFICE VISIT (OUTPATIENT)
Dept: UROLOGY | Facility: CLINIC | Age: 64
End: 2019-10-10
Payer: COMMERCIAL

## 2019-10-10 VITALS
DIASTOLIC BLOOD PRESSURE: 80 MMHG | OXYGEN SATURATION: 95 % | HEIGHT: 73 IN | WEIGHT: 275.88 LBS | SYSTOLIC BLOOD PRESSURE: 132 MMHG | HEART RATE: 76 BPM | RESPIRATION RATE: 19 BRPM | BODY MASS INDEX: 36.56 KG/M2

## 2019-10-10 DIAGNOSIS — K21.9 GASTROESOPHAGEAL REFLUX DISEASE, ESOPHAGITIS PRESENCE NOT SPECIFIED: ICD-10-CM

## 2019-10-10 DIAGNOSIS — R73.03 PREDIABETES: ICD-10-CM

## 2019-10-10 DIAGNOSIS — N52.9 IMPOTENCE: Primary | ICD-10-CM

## 2019-10-10 DIAGNOSIS — F41.9 CHRONIC ANXIETY: ICD-10-CM

## 2019-10-10 DIAGNOSIS — E29.1 HYPOGONADISM IN MALE: ICD-10-CM

## 2019-10-10 DIAGNOSIS — R79.89 LOW TESTOSTERONE LEVEL IN MALE: ICD-10-CM

## 2019-10-10 DIAGNOSIS — I10 ESSENTIAL HYPERTENSION: Primary | ICD-10-CM

## 2019-10-10 DIAGNOSIS — N40.0 BPH WITHOUT URINARY OBSTRUCTION: ICD-10-CM

## 2019-10-10 DIAGNOSIS — E78.2 MIXED HYPERLIPIDEMIA: ICD-10-CM

## 2019-10-10 DIAGNOSIS — H10.10 ALLERGIC RHINOCONJUNCTIVITIS: ICD-10-CM

## 2019-10-10 DIAGNOSIS — Z00.00 PREVENTATIVE HEALTH CARE: ICD-10-CM

## 2019-10-10 DIAGNOSIS — J30.9 ALLERGIC RHINOCONJUNCTIVITIS: ICD-10-CM

## 2019-10-10 PROCEDURE — 90471 PNEUMOCOCCAL POLYSACCHARIDE VACCINE 23-VALENT =>2YO SQ IM: ICD-10-PCS | Mod: S$GLB,,, | Performed by: INTERNAL MEDICINE

## 2019-10-10 PROCEDURE — 3075F SYST BP GE 130 - 139MM HG: CPT | Mod: CPTII,S$GLB,, | Performed by: INTERNAL MEDICINE

## 2019-10-10 PROCEDURE — 3079F PR MOST RECENT DIASTOLIC BLOOD PRESSURE 80-89 MM HG: ICD-10-PCS | Mod: CPTII,S$GLB,, | Performed by: INTERNAL MEDICINE

## 2019-10-10 PROCEDURE — 3079F DIAST BP 80-89 MM HG: CPT | Mod: CPTII,S$GLB,, | Performed by: INTERNAL MEDICINE

## 2019-10-10 PROCEDURE — 99999 PR PBB SHADOW E&M-EST. PATIENT-LVL III: CPT | Mod: PBBFAC,,, | Performed by: INTERNAL MEDICINE

## 2019-10-10 PROCEDURE — 99214 OFFICE O/P EST MOD 30 MIN: CPT | Mod: S$GLB,,, | Performed by: UROLOGY

## 2019-10-10 PROCEDURE — 99999 PR PBB SHADOW E&M-EST. PATIENT-LVL II: CPT | Mod: PBBFAC,,, | Performed by: UROLOGY

## 2019-10-10 PROCEDURE — 90732 PNEUMOCOCCAL POLYSACCHARIDE VACCINE 23-VALENT =>2YO SQ IM: ICD-10-PCS | Mod: S$GLB,,, | Performed by: INTERNAL MEDICINE

## 2019-10-10 PROCEDURE — 1101F PT FALLS ASSESS-DOCD LE1/YR: CPT | Mod: CPTII,S$GLB,, | Performed by: INTERNAL MEDICINE

## 2019-10-10 PROCEDURE — 1101F PR PT FALLS ASSESS DOC 0-1 FALLS W/OUT INJ PAST YR: ICD-10-PCS | Mod: CPTII,S$GLB,, | Performed by: INTERNAL MEDICINE

## 2019-10-10 PROCEDURE — 99999 PR PBB SHADOW E&M-EST. PATIENT-LVL III: ICD-10-PCS | Mod: PBBFAC,,, | Performed by: INTERNAL MEDICINE

## 2019-10-10 PROCEDURE — 90471 IMMUNIZATION ADMIN: CPT | Mod: S$GLB,,, | Performed by: INTERNAL MEDICINE

## 2019-10-10 PROCEDURE — 3075F PR MOST RECENT SYSTOLIC BLOOD PRESS GE 130-139MM HG: ICD-10-PCS | Mod: CPTII,S$GLB,, | Performed by: INTERNAL MEDICINE

## 2019-10-10 PROCEDURE — 90732 PPSV23 VACC 2 YRS+ SUBQ/IM: CPT | Mod: S$GLB,,, | Performed by: INTERNAL MEDICINE

## 2019-10-10 PROCEDURE — 99214 PR OFFICE/OUTPT VISIT, EST, LEVL IV, 30-39 MIN: ICD-10-PCS | Mod: 25,S$GLB,, | Performed by: INTERNAL MEDICINE

## 2019-10-10 PROCEDURE — 1101F PT FALLS ASSESS-DOCD LE1/YR: CPT | Mod: CPTII,S$GLB,, | Performed by: UROLOGY

## 2019-10-10 PROCEDURE — 1101F PR PT FALLS ASSESS DOC 0-1 FALLS W/OUT INJ PAST YR: ICD-10-PCS | Mod: CPTII,S$GLB,, | Performed by: UROLOGY

## 2019-10-10 PROCEDURE — 99214 PR OFFICE/OUTPT VISIT, EST, LEVL IV, 30-39 MIN: ICD-10-PCS | Mod: S$GLB,,, | Performed by: UROLOGY

## 2019-10-10 PROCEDURE — 99999 PR PBB SHADOW E&M-EST. PATIENT-LVL II: ICD-10-PCS | Mod: PBBFAC,,, | Performed by: UROLOGY

## 2019-10-10 PROCEDURE — 99214 OFFICE O/P EST MOD 30 MIN: CPT | Mod: 25,S$GLB,, | Performed by: INTERNAL MEDICINE

## 2019-10-10 RX ORDER — METOPROLOL SUCCINATE 50 MG/1
50 TABLET, EXTENDED RELEASE ORAL NIGHTLY
Qty: 90 TABLET | Refills: 1 | Status: SHIPPED | OUTPATIENT
Start: 2019-10-10 | End: 2020-02-19 | Stop reason: SDUPTHER

## 2019-10-10 RX ORDER — LISINOPRIL AND HYDROCHLOROTHIAZIDE 20; 25 MG/1; MG/1
1 TABLET ORAL DAILY
Qty: 90 TABLET | Refills: 1 | Status: SHIPPED | OUTPATIENT
Start: 2019-10-10 | End: 2020-05-18 | Stop reason: SDUPTHER

## 2019-10-10 RX ORDER — SILDENAFIL 100 MG/1
100 TABLET, FILM COATED ORAL DAILY PRN
Qty: 10 TABLET | Refills: 11 | Status: SHIPPED | OUTPATIENT
Start: 2019-10-10 | End: 2020-11-06 | Stop reason: SDUPTHER

## 2019-10-10 RX ORDER — SERTRALINE HYDROCHLORIDE 100 MG/1
100 TABLET, FILM COATED ORAL DAILY
Qty: 90 TABLET | Refills: 1 | Status: SHIPPED | OUTPATIENT
Start: 2019-10-10 | End: 2020-05-18 | Stop reason: SDUPTHER

## 2019-10-10 RX ORDER — PANTOPRAZOLE SODIUM 40 MG/1
40 TABLET, DELAYED RELEASE ORAL DAILY
Qty: 90 TABLET | Refills: 1 | Status: SHIPPED | OUTPATIENT
Start: 2019-10-10 | End: 2020-05-18 | Stop reason: SDUPTHER

## 2019-10-10 RX ORDER — ATORVASTATIN CALCIUM 40 MG/1
40 TABLET, FILM COATED ORAL NIGHTLY
Qty: 90 TABLET | Refills: 1 | Status: SHIPPED | OUTPATIENT
Start: 2019-10-10 | End: 2020-05-18 | Stop reason: SDUPTHER

## 2019-10-10 RX ORDER — AZELASTINE 1 MG/ML
1 SPRAY, METERED NASAL 2 TIMES DAILY
Qty: 30 ML | Refills: 1 | Status: SHIPPED | OUTPATIENT
Start: 2019-10-10 | End: 2020-03-18 | Stop reason: SDUPTHER

## 2019-10-10 NOTE — PROGRESS NOTES
Subjective:       Patient ID: Nilesh Sanchez is a 64 y.o. male.    Chief Complaint: Annual Exam    HPI     64-year-old with a history of low testosterone.  He had been on testosterone replacement in the past.  We held his testosterone cream last year.  His testosterone did rebound some in his most recent value was 157.  He overall feels good he has normal energy level and does not wish to resume testosterone replacement.  He does complain of ED.  He says he has soft erections the do not last long enough.  He has never tried any PDE 5 inhibitors.  He takes no nitrates.  He again has no voiding complaints today.  He denies hematuria and dysuria.  His most recent PSA is 0.61 and his stable.    Review of Systems   Constitutional: Negative for fever.   Genitourinary: Negative for dysuria and hematuria.       Objective:      Physical Exam   Constitutional: He is oriented to person, place, and time. He appears well-developed and well-nourished.   HENT:   Head: Normocephalic and atraumatic.   Eyes: Conjunctivae are normal.   Cardiovascular: Normal rate.   Pulmonary/Chest: Effort normal.   Genitourinary: Rectal exam shows no mass and anal tone normal. Prostate is enlarged (30g, s/s/a). Prostate is not tender.   Musculoskeletal: Normal range of motion. He exhibits no edema.   Neurological: He is alert and oriented to person, place, and time.   Skin: Skin is warm and dry. No rash noted.   Psychiatric: He has a normal mood and affect.   Vitals reviewed.      Assessment:       1. Impotence    2. Low testosterone level in male    3. BPH without urinary obstruction        Plan:       Impotence    Low testosterone level in male    BPH without urinary obstruction    Other orders  -     sildenafil (VIAGRA) 100 MG tablet; Take 1 tablet (100 mg total) by mouth daily as needed for Erectile Dysfunction.  Dispense: 10 tablet; Refill: 11      I recommended trial of Viagra.  Follow-up 1 year

## 2019-10-10 NOTE — PROGRESS NOTES
Assessment and Plan:    1. Essential hypertension  BP controlled. Discussed possible increased risk of lung cancer with ACE but also discussed problems with supply of ARBs. Patient prefers to stay with same medication for now and will readdress in 6 months.  - Comprehensive metabolic panel; Future  - Hemoglobin A1c; Future  - lisinopril-hydrochlorothiazide (PRINZIDE,ZESTORETIC) 20-25 mg Tab; Take 1 tablet by mouth once daily.  Dispense: 90 tablet; Refill: 1  - metoprolol succinate (TOPROL-XL) 50 MG 24 hr tablet; Take 1 tablet (50 mg total) by mouth every evening.  Dispense: 90 tablet; Refill: 1    2. Prediabetes  Extensively discussed diet changes. States that by next visit in 6 months he will be down to 250 lbs and A1c will be down.  - Hemoglobin A1c; Future    3. Mixed hyperlipidemia  - Lipid panel; Future  - atorvastatin (LIPITOR) 40 MG tablet; Take 1 tablet (40 mg total) by mouth nightly.  Dispense: 90 tablet; Refill: 1    4. Gastroesophageal reflux disease, esophagitis presence not specified  - CBC auto differential; Future  - pantoprazole (PROTONIX) 40 MG tablet; Take 1 tablet (40 mg total) by mouth once daily.  Dispense: 90 tablet; Refill: 1    5. Chronic anxiety  Doing well, continue current dose  - sertraline (ZOLOFT) 100 MG tablet; Take 1 tablet (100 mg total) by mouth once daily.  Dispense: 90 tablet; Refill: 1    6. Hypogonadism in male  Testosterone increasing without supplementation. Patient prefers to stay off of supplementation.    7. Preventative health care  - Pneumococcal Polysaccharide Vaccine (23 Valent) (SQ/IM)  - Comprehensive metabolic panel; Future  - Hemoglobin A1c; Future  - Lipid panel; Future  - CBC auto differential; Future    8. Allergic rhinoconjunctivitis  - azelastine (ASTELIN) 137 mcg (0.1 %) nasal spray; 1 spray (137 mcg total) by Nasal route 2 (two) times daily.  Dispense: 30 mL; Refill:  "1    ______________________________________________________________________  Subjective:    Chief Complaint:  Follow up chronic medical conditions.     HPI:  Nilesh is a 64 y.o. year old man here to follow up chronic medical conditions.     HTN- Takes lisinopril, HCTZ, and metoprolol for BP. Has historically been at goal on this. No problems with the medications, BP has been controlled.      HLD- Takes atorvastatin, no problems with this.     PreDM- A1c now creeping up to 6.1. Notes that his diet has been eating a lot of "white" foods lately and feels that he really need to decrease these foods.     Chronic anxiety- Takes sertraline and doing well with this.      GERD- Taking PPI and not having symptoms at this time.      Hypogonadism- Now seeing Urology, testosterone improving without being on supplementation.    Medications:  Current Outpatient Medications on File Prior to Visit   Medication Sig Dispense Refill    atorvastatin (LIPITOR) 40 MG tablet TAKE 1 TABLET EVERY NIGHT 90 tablet 0    azelastine (ASTELIN) 137 mcg (0.1 %) nasal spray 1 spray (137 mcg total) by Nasal route 2 (two) times daily. (Patient taking differently: 1 spray by Nasal route daily as needed. ) 30 mL 1    fexofenadine (ALLEGRA) 180 MG tablet Take 1 tablet (180 mg total) by mouth once daily. 90 tablet 1    lisinopril-hydrochlorothiazide (PRINZIDE,ZESTORETIC) 20-25 mg Tab TAKE 1 TABLET EVERY DAY 90 tablet 0    metoprolol succinate (TOPROL-XL) 50 MG 24 hr tablet TAKE 1 TABLET (50 MG TOTAL) BY MOUTH EVERY EVENING. 90 tablet 0    pantoprazole (PROTONIX) 40 MG tablet TAKE 1 TABLET EVERY DAY 90 tablet 0    sertraline (ZOLOFT) 100 MG tablet TAKE 1 TABLET EVERY DAY 90 tablet 0     No current facility-administered medications on file prior to visit.        Review of Systems:  Review of Systems   Constitutional: Negative for activity change and unexpected weight change.   HENT: Negative for hearing loss, rhinorrhea and trouble swallowing.  " "  Eyes: Negative for discharge and visual disturbance.   Respiratory: Negative for chest tightness and wheezing.    Cardiovascular: Negative for chest pain and palpitations.   Gastrointestinal: Negative for blood in stool, constipation, diarrhea and vomiting.   Endocrine: Negative for polydipsia and polyuria.   Genitourinary: Negative for difficulty urinating, hematuria and urgency.   Musculoskeletal: Positive for arthralgias and joint swelling. Negative for neck pain.   Neurological: Negative for weakness and headaches.   Psychiatric/Behavioral: Negative for confusion and dysphoric mood.     Entered by patient and reviewed and updated during visit      Past Medical History:  Past Medical History:   Diagnosis Date    Allergic rhinosinusitis     Anxiety     Back pain     with spasms    Diverticulitis     s/p partial colectomy    GERD (gastroesophageal reflux disease)     Hyperlipidemia     Hypertension        Objective:    Vitals:  Vitals:    10/10/19 1409   BP: 132/80   Pulse: 76   Resp: 19   SpO2: 95%   Weight: 125.1 kg (275 lb 14.5 oz)   Height: 6' 1" (1.854 m)   PainSc: 0-No pain       Physical Exam   Constitutional: He is oriented to person, place, and time. He appears well-developed and well-nourished. No distress.   HENT:   Mouth/Throat: Oropharynx is clear and moist.   Eyes: Conjunctivae are normal.   Cardiovascular: Normal rate and regular rhythm.   Pulmonary/Chest: Effort normal. No respiratory distress.   Neurological: He is alert and oriented to person, place, and time.   Skin: Skin is warm and dry.   Psychiatric: He has a normal mood and affect. His behavior is normal.   Vitals reviewed.      Data:  Previous labs reviewed and pertinent for CMP normal, A1c 6.1.      Flavia Lange MD  Internal Medicine  "

## 2019-10-18 ENCOUNTER — TELEPHONE (OUTPATIENT)
Dept: FAMILY MEDICINE | Facility: CLINIC | Age: 64
End: 2019-10-18

## 2019-10-18 ENCOUNTER — HOSPITAL ENCOUNTER (OUTPATIENT)
Dept: RADIOLOGY | Facility: HOSPITAL | Age: 64
Discharge: HOME | End: 2019-10-18
Attending: NURSE PRACTITIONER
Payer: COMMERCIAL

## 2019-10-18 ENCOUNTER — LAB VISIT (OUTPATIENT)
Dept: LAB | Facility: HOSPITAL | Age: 64
End: 2019-10-18
Attending: NURSE PRACTITIONER
Payer: COMMERCIAL

## 2019-10-18 ENCOUNTER — OFFICE VISIT (OUTPATIENT)
Dept: FAMILY MEDICINE | Facility: CLINIC | Age: 64
End: 2019-10-18
Payer: COMMERCIAL

## 2019-10-18 VITALS
SYSTOLIC BLOOD PRESSURE: 136 MMHG | BODY MASS INDEX: 35.87 KG/M2 | OXYGEN SATURATION: 94 % | HEIGHT: 73 IN | DIASTOLIC BLOOD PRESSURE: 80 MMHG | HEART RATE: 75 BPM | TEMPERATURE: 98 F | WEIGHT: 270.63 LBS

## 2019-10-18 DIAGNOSIS — R10.9 ABDOMINAL SPASMS: ICD-10-CM

## 2019-10-18 DIAGNOSIS — R10.31 RLQ ABDOMINAL PAIN: ICD-10-CM

## 2019-10-18 DIAGNOSIS — K56.609 INTESTINAL OBSTRUCTION, UNSPECIFIED CAUSE, UNSPECIFIED WHETHER PARTIAL OR COMPLETE: ICD-10-CM

## 2019-10-18 DIAGNOSIS — R10.31 RLQ ABDOMINAL PAIN: Primary | ICD-10-CM

## 2019-10-18 LAB
ALBUMIN SERPL BCP-MCNC: 4.6 G/DL (ref 3.5–5.2)
ALP SERPL-CCNC: 110 U/L (ref 55–135)
ALT SERPL W/O P-5'-P-CCNC: 60 U/L (ref 10–44)
ANION GAP SERPL CALC-SCNC: 12 MMOL/L (ref 8–16)
AST SERPL-CCNC: 37 U/L (ref 10–40)
BASOPHILS # BLD AUTO: 0.06 K/UL (ref 0–0.2)
BASOPHILS NFR BLD: 0.5 % (ref 0–1.9)
BILIRUB SERPL-MCNC: 0.5 MG/DL (ref 0.1–1)
BILIRUB SERPL-MCNC: NEGATIVE MG/DL
BLOOD URINE, POC: NEGATIVE
BUN SERPL-MCNC: 22 MG/DL (ref 8–23)
CALCIUM SERPL-MCNC: 10.9 MG/DL (ref 8.7–10.5)
CHLORIDE SERPL-SCNC: 95 MMOL/L (ref 95–110)
CO2 SERPL-SCNC: 33 MMOL/L (ref 23–29)
COLOR, POC UA: ABNORMAL
CREAT SERPL-MCNC: 1.4 MG/DL (ref 0.5–1.4)
DIFFERENTIAL METHOD: ABNORMAL
EOSINOPHIL # BLD AUTO: 0.1 K/UL (ref 0–0.5)
EOSINOPHIL NFR BLD: 0.4 % (ref 0–8)
ERYTHROCYTE [DISTWIDTH] IN BLOOD BY AUTOMATED COUNT: 13.2 % (ref 11.5–14.5)
EST. GFR  (AFRICAN AMERICAN): >60 ML/MIN/1.73 M^2
EST. GFR  (NON AFRICAN AMERICAN): 52 ML/MIN/1.73 M^2
GLUCOSE SERPL-MCNC: 125 MG/DL (ref 70–110)
GLUCOSE UR QL STRIP: NORMAL
HCT VFR BLD AUTO: 46.2 % (ref 40–54)
HGB BLD-MCNC: 14.9 G/DL (ref 14–18)
IMM GRANULOCYTES # BLD AUTO: 0.04 K/UL (ref 0–0.04)
IMM GRANULOCYTES NFR BLD AUTO: 0.3 % (ref 0–0.5)
KETONES UR QL STRIP: NEGATIVE
LEUKOCYTE ESTERASE URINE, POC: NEGATIVE
LYMPHOCYTES # BLD AUTO: 2.4 K/UL (ref 1–4.8)
LYMPHOCYTES NFR BLD: 19.7 % (ref 18–48)
MCH RBC QN AUTO: 30.5 PG (ref 27–31)
MCHC RBC AUTO-ENTMCNC: 32.3 G/DL (ref 32–36)
MCV RBC AUTO: 95 FL (ref 82–98)
MONOCYTES # BLD AUTO: 1 K/UL (ref 0.3–1)
MONOCYTES NFR BLD: 7.8 % (ref 4–15)
NEUTROPHILS # BLD AUTO: 8.7 K/UL (ref 1.8–7.7)
NEUTROPHILS NFR BLD: 71.3 % (ref 38–73)
NITRITE, POC UA: NEGATIVE
NRBC BLD-RTO: 0 /100 WBC
PH, POC UA: 5
PLATELET # BLD AUTO: 293 K/UL (ref 150–350)
PMV BLD AUTO: 10.6 FL (ref 9.2–12.9)
POTASSIUM SERPL-SCNC: 4.9 MMOL/L (ref 3.5–5.1)
PROT SERPL-MCNC: 8.2 G/DL (ref 6–8.4)
PROTEIN, POC: ABNORMAL
RBC # BLD AUTO: 4.88 M/UL (ref 4.6–6.2)
SODIUM SERPL-SCNC: 140 MMOL/L (ref 136–145)
SPECIFIC GRAVITY, POC UA: 1.02
UROBILINOGEN, POC UA: NORMAL
WBC # BLD AUTO: 12.17 K/UL (ref 3.9–12.7)

## 2019-10-18 PROCEDURE — 3079F PR MOST RECENT DIASTOLIC BLOOD PRESSURE 80-89 MM HG: ICD-10-PCS | Mod: CPTII,S$GLB,, | Performed by: NURSE PRACTITIONER

## 2019-10-18 PROCEDURE — 74176 CT ABDOMEN PELVIS WITHOUT CONTRAST: ICD-10-PCS | Mod: 26,,, | Performed by: RADIOLOGY

## 2019-10-18 PROCEDURE — 81002 POCT URINE DIPSTICK WITHOUT MICROSCOPE: ICD-10-PCS | Mod: S$GLB,,, | Performed by: NURSE PRACTITIONER

## 2019-10-18 PROCEDURE — 99999 PR PBB SHADOW E&M-EST. PATIENT-LVL IV: ICD-10-PCS | Mod: PBBFAC,,, | Performed by: NURSE PRACTITIONER

## 2019-10-18 PROCEDURE — 1101F PR PT FALLS ASSESS DOC 0-1 FALLS W/OUT INJ PAST YR: ICD-10-PCS | Mod: CPTII,S$GLB,, | Performed by: NURSE PRACTITIONER

## 2019-10-18 PROCEDURE — 81002 URINALYSIS NONAUTO W/O SCOPE: CPT | Mod: S$GLB,,, | Performed by: NURSE PRACTITIONER

## 2019-10-18 PROCEDURE — 1101F PT FALLS ASSESS-DOCD LE1/YR: CPT | Mod: CPTII,S$GLB,, | Performed by: NURSE PRACTITIONER

## 2019-10-18 PROCEDURE — 25500020 PHARM REV CODE 255: Mod: PO | Performed by: NURSE PRACTITIONER

## 2019-10-18 PROCEDURE — 74176 CT ABD & PELVIS W/O CONTRAST: CPT | Mod: 26,,, | Performed by: RADIOLOGY

## 2019-10-18 PROCEDURE — 99214 OFFICE O/P EST MOD 30 MIN: CPT | Mod: 25,S$GLB,, | Performed by: NURSE PRACTITIONER

## 2019-10-18 PROCEDURE — 3075F SYST BP GE 130 - 139MM HG: CPT | Mod: CPTII,S$GLB,, | Performed by: NURSE PRACTITIONER

## 2019-10-18 PROCEDURE — 80053 COMPREHEN METABOLIC PANEL: CPT | Mod: PO

## 2019-10-18 PROCEDURE — 74176 CT ABD & PELVIS W/O CONTRAST: CPT | Mod: TC,PO

## 2019-10-18 PROCEDURE — 3079F DIAST BP 80-89 MM HG: CPT | Mod: CPTII,S$GLB,, | Performed by: NURSE PRACTITIONER

## 2019-10-18 PROCEDURE — 85025 COMPLETE CBC W/AUTO DIFF WBC: CPT

## 2019-10-18 PROCEDURE — 3075F PR MOST RECENT SYSTOLIC BLOOD PRESS GE 130-139MM HG: ICD-10-PCS | Mod: CPTII,S$GLB,, | Performed by: NURSE PRACTITIONER

## 2019-10-18 PROCEDURE — 99214 PR OFFICE/OUTPT VISIT, EST, LEVL IV, 30-39 MIN: ICD-10-PCS | Mod: 25,S$GLB,, | Performed by: NURSE PRACTITIONER

## 2019-10-18 PROCEDURE — 36415 COLL VENOUS BLD VENIPUNCTURE: CPT | Mod: PO

## 2019-10-18 PROCEDURE — 99999 PR PBB SHADOW E&M-EST. PATIENT-LVL IV: CPT | Mod: PBBFAC,,, | Performed by: NURSE PRACTITIONER

## 2019-10-18 RX ORDER — HYOSCYAMINE SULFATE 0.12 MG/1
0.12 TABLET SUBLINGUAL EVERY 4 HOURS PRN
Qty: 10 TABLET | Refills: 0 | Status: SHIPPED | OUTPATIENT
Start: 2019-10-18 | End: 2020-11-06

## 2019-10-18 RX ORDER — ONDANSETRON 8 MG/1
8 TABLET, ORALLY DISINTEGRATING ORAL ONCE
Qty: 10 TABLET | Refills: 0 | Status: ON HOLD | OUTPATIENT
Start: 2019-10-18 | End: 2019-10-19 | Stop reason: HOSPADM

## 2019-10-18 RX ADMIN — IOHEXOL 30 ML: 350 INJECTION, SOLUTION INTRAVENOUS at 12:10

## 2019-10-18 NOTE — TELEPHONE ENCOUNTER
Received message from Radiology that CT showed SBO. Patient seen by NP this morning for abdominal pain and vomiting concerning for SBO. Attempted to call patient x 2, called office, and called his emergency contact (wife). Unable to get in contact with patient. Left voicemail informing him of the CT result and recommended ER presentation today. Will try to call patient again. If unable to get in touch with him in the next hour or so, will discuss recommendation to go to ER with his emergency contact.

## 2019-10-18 NOTE — TELEPHONE ENCOUNTER
Spoke with patient and he states that he took the levsin after the CT and he states that he has gone to bathroom like 8 times. Per Dr Lange and Virgil Quigley NP they recommend patient is still seen in the ER. Patient verbalized understanding.

## 2019-10-18 NOTE — PROGRESS NOTES
This dictation has been generated using Modal Fluency Dictation some phonetic errors may occur. Please contact author for clarification if needed.     Problem List Items Addressed This Visit     None      Visit Diagnoses     RLQ abdominal pain    -  Primary    Relevant Orders    CBC auto differential    Comprehensive metabolic panel    POCT urine dipstick without microscope (Completed)    Abdominal spasms        Intestinal obstruction, unspecified cause, unspecified whether partial or complete        Relevant Orders    CT Abdomen Pelvis  Without Contrast          Orders Placed This Encounter    CT Abdomen Pelvis  Without Contrast    CBC auto differential    Comprehensive metabolic panel    POCT urine dipstick without microscope    hyoscyamine (LEVSIN/SL) 0.125 mg Subl    ondansetron (ZOFRAN-ODT) 8 MG TbDL     Abdominal pain consider small-bowel obstruction.  Consider diverticulitis.  Less likely appendicitis.  CT as above stat.  Urinalysis does not reveal urinary tract infection.  Check CBC CMP.  I will review all results and address accordingly.  Spasm medicine Levsin as above and Zofran.  Increase fluids.    No follow-ups on file.    ________________________________________________________________  ________________________________________________________________      Chief Complaint   Patient presents with    Abdominal Pain     Sharp stomach pains and throbbing spasms occuring since Wedsnesday     Emesis     Ocurring since Wednesday. Not able to eat very much      History of present illness  This 64 y.o. presents today for complaint of abdominal pain and vomiting.  Patient notes onset of symptoms Wednesday after lunch.  He had some burning abdominal pain after eating cauliflower and [employer].  The food was left over but not older than 3 days.  Started having abdominal pain and nausea and vomiting later that night.  Patient describes abdominal pain as a persistent burning rated as about a 4 on a scale of  1-10.  Generally indicates epigastric region.  At times has spasms in the abdomen which are rated as an 8 on a scale of 1-10.  He did not eat any raw seafood.  He did not eat any raw vegetables prior to onset.  No recent travel.  No recent antibiotic use.  No pain med use.  No anti-inflammatory med use.  No new medicines.  Taking Protonix as directed.  Patient notes history of GERD and colectomy due to diverticulitis.  He tried some Zofran which was  in February but this did not help nausea and vomiting.  Review of systems  No fever but he has had chills and sweats  Patient denies diarrhea.  No stool changes.  No dysuria or hematuria.  Patient denies presence of rash.    Past medical and social history reviewed.  Patient is new to me.      Past Medical History:   Diagnosis Date    Allergic rhinosinusitis     Anxiety     Back pain     with spasms    Diverticulitis     s/p partial colectomy    GERD (gastroesophageal reflux disease)     Hyperlipidemia     Hypertension        Past Surgical History:   Procedure Laterality Date    ARTHROSCOPIC CHONDROPLASTY OF KNEE JOINT Left 2018    Procedure: ARTHROSCOPY, KNEE, WITH CHONDROPLASTY;  Surgeon: Buster Bansal MD;  Location: [facility];  Service: Orthopedics;  Laterality: Left;    COLECTOMY      18in of sigmoid colon removed    COLONOSCOPY      ESOPHAGOGASTRODUODENOSCOPY      KNEE ARTHROSCOPY W/ MENISCECTOMY Left 2018    Procedure: ARTHROSCOPY, KNEE, WITH MENISCECTOMY PARTIAL MEDIAL;  Surgeon: Buster Bansal MD;  Location: [facility];  Service: Orthopedics;  Laterality: Left;  femoral     LUMBAR DISC SURGERY  2017    L4/5    UPPER GASTROINTESTINAL ENDOSCOPY         Family History   Problem Relation Age of Onset    Heart disease Mother     Heart disease Father     Heart disease Sister     Atrial fibrillation Sister     Heart disease Brother        Social History     Socioeconomic History    Marital status:      Spouse  name: Not on file    Number of children: Not on file    Years of education: Not on file    Highest education level: Not on file   Occupational History    Not on file   Social Needs    Financial resource strain: Not very hard    Food insecurity:     Worry: Never true     Inability: Never true    Transportation needs:     Medical: No     Non-medical: No   Tobacco Use    Smoking status: Never Smoker    Smokeless tobacco: Never Used   Substance and Sexual Activity    Alcohol use: Yes     Alcohol/week: 16.0 - 18.0 standard drinks     Types: 2 Glasses of wine, 14 - 16 Standard drinks or equivalent per week     Frequency: 4 or more times a week     Drinks per session: 1 or 2     Binge frequency: Monthly     Comment: wine daily    Drug use: No    Sexual activity: Yes     Partners: Female   Lifestyle    Physical activity:     Days per week: 2 days     Minutes per session: 150+ min    Stress: Rather much   Relationships    Social connections:     Talks on phone: Once a week     Gets together: Once a week     Attends Gnosticism service: Not on file     Active member of club or organization: Yes     Attends meetings of clubs or organizations: 1 to 4 times per year     Relationship status:    Other Topics Concern    Not on file   Social History Narrative    Owns company making leather belts.        Current Outpatient Medications   Medication Sig Dispense Refill    atorvastatin (LIPITOR) 40 MG tablet Take 1 tablet (40 mg total) by mouth nightly. 90 tablet 1    azelastine (ASTELIN) 137 mcg (0.1 %) nasal spray 1 spray (137 mcg total) by Nasal route 2 (two) times daily. 30 mL 1    fexofenadine (ALLEGRA) 180 MG tablet Take 1 tablet (180 mg total) by mouth once daily. 90 tablet 1    lisinopril-hydrochlorothiazide (PRINZIDE,ZESTORETIC) 20-25 mg Tab Take 1 tablet by mouth once daily. 90 tablet 1    metoprolol succinate (TOPROL-XL) 50 MG 24 hr tablet Take 1 tablet (50 mg total) by mouth every evening. 90 tablet  1    pantoprazole (PROTONIX) 40 MG tablet Take 1 tablet (40 mg total) by mouth once daily. 90 tablet 1    sertraline (ZOLOFT) 100 MG tablet Take 1 tablet (100 mg total) by mouth once daily. 90 tablet 1    sildenafil (VIAGRA) 100 MG tablet Take 1 tablet (100 mg total) by mouth daily as needed for Erectile Dysfunction. 10 tablet 11    hyoscyamine (LEVSIN/SL) 0.125 mg Subl Place 1 tablet (0.125 mg total) under the tongue every 4 (four) hours as needed. 10 tablet 0    ondansetron (ZOFRAN-ODT) 8 MG TbDL Take 1 tablet (8 mg total) by mouth once. for 1 dose 10 tablet 0     No current facility-administered medications for this visit.        Review of patient's allergies indicates:  No Known Allergies    Physical examination  Vitals Reviewed  Gen. Well-dressed well-nourished   Skin warm dry and intact.  No rashes noted.  Neck is supple without adenopathy  Chest.  Respirations are even unlabored.  Lungs are clear to auscultation.  Cardiac regular rate and rhythm.  No chest wall adenopathy noted.  Abdomen is soft and not distended.  Bowel sounds are present.  ++ tenderness during palpation of the abdomen.  Tender primarily in the right lower quadrant.  Minimal guarding noted. No tenderness at McBurney's point.  Guillory sign is negative.  No Hepatosplenomegaly noted.  No hernia noted.  No CVA tenderness to percussion.    Neuro. Awake alert oriented x4.  Normal judgment and cognition noted.  Extremities no clubbing cyanosis or edema noted.     Call or return to clinic prn if these symptoms worsen or fail to improve as anticipated.

## 2019-10-18 NOTE — TELEPHONE ENCOUNTER
Spoke with spouse and she stated that she was going to contact the neighbor to go and see if they could get in touch with patient regarding results and going to ER. She is getting on a plane but will try and to continue to reach patient.

## 2019-11-05 NOTE — ASSESSMENT & PLAN NOTE
Home meds   bilateral  lower extremity Active ROM was WFL (within functional limits)/bilateral upper extremity Active ROM was WFL (within functional limits)

## 2020-02-19 DIAGNOSIS — I10 ESSENTIAL HYPERTENSION: ICD-10-CM

## 2020-02-20 RX ORDER — METOPROLOL SUCCINATE 50 MG/1
50 TABLET, EXTENDED RELEASE ORAL NIGHTLY
Qty: 90 TABLET | Refills: 0 | Status: SHIPPED | OUTPATIENT
Start: 2020-02-20 | End: 2020-02-21 | Stop reason: SDUPTHER

## 2020-02-21 DIAGNOSIS — I10 ESSENTIAL HYPERTENSION: ICD-10-CM

## 2020-02-21 RX ORDER — METOPROLOL SUCCINATE 50 MG/1
50 TABLET, EXTENDED RELEASE ORAL NIGHTLY
Qty: 90 TABLET | Refills: 0 | Status: SHIPPED | OUTPATIENT
Start: 2020-02-21 | End: 2020-05-18 | Stop reason: SDUPTHER

## 2020-03-18 ENCOUNTER — PATIENT MESSAGE (OUTPATIENT)
Dept: FAMILY MEDICINE | Facility: CLINIC | Age: 65
End: 2020-03-18

## 2020-03-18 ENCOUNTER — TELEPHONE (OUTPATIENT)
Dept: FAMILY MEDICINE | Facility: CLINIC | Age: 65
End: 2020-03-18

## 2020-03-18 ENCOUNTER — OFFICE VISIT (OUTPATIENT)
Dept: FAMILY MEDICINE | Facility: CLINIC | Age: 65
End: 2020-03-18
Payer: MEDICARE

## 2020-03-18 VITALS
SYSTOLIC BLOOD PRESSURE: 140 MMHG | TEMPERATURE: 98 F | DIASTOLIC BLOOD PRESSURE: 90 MMHG | HEART RATE: 61 BPM | RESPIRATION RATE: 16 BRPM

## 2020-03-18 DIAGNOSIS — B96.89 ACUTE BACTERIAL SINUSITIS: Primary | ICD-10-CM

## 2020-03-18 DIAGNOSIS — H10.10 ALLERGIC RHINOCONJUNCTIVITIS: ICD-10-CM

## 2020-03-18 DIAGNOSIS — J01.90 ACUTE BACTERIAL SINUSITIS: Primary | ICD-10-CM

## 2020-03-18 DIAGNOSIS — J30.9 ALLERGIC RHINOCONJUNCTIVITIS: ICD-10-CM

## 2020-03-18 PROCEDURE — 99213 OFFICE O/P EST LOW 20 MIN: CPT | Mod: 95,,, | Performed by: INTERNAL MEDICINE

## 2020-03-18 PROCEDURE — 99213 PR OFFICE/OUTPT VISIT, EST, LEVL III, 20-29 MIN: ICD-10-PCS | Mod: 95,,, | Performed by: INTERNAL MEDICINE

## 2020-03-18 RX ORDER — AMOXICILLIN AND CLAVULANATE POTASSIUM 875; 125 MG/1; MG/1
1 TABLET, FILM COATED ORAL EVERY 12 HOURS
Qty: 14 TABLET | Refills: 0 | Status: SHIPPED | OUTPATIENT
Start: 2020-03-18 | End: 2020-03-25

## 2020-03-18 RX ORDER — METHYLPREDNISOLONE 4 MG/1
TABLET ORAL
Qty: 1 PACKAGE | Refills: 0 | Status: SHIPPED | OUTPATIENT
Start: 2020-03-18 | End: 2020-04-08

## 2020-03-18 RX ORDER — AZELASTINE 1 MG/ML
1 SPRAY, METERED NASAL 2 TIMES DAILY
Qty: 30 ML | Refills: 1 | Status: SHIPPED | OUTPATIENT
Start: 2020-03-18 | End: 2020-11-23

## 2020-03-18 NOTE — PROGRESS NOTES
Assessment and Plan:    1. Acute bacterial sinusitis  Symptoms with progressive sinus pain and pressure as well as body aches concerning for development of acute bacterial sinusitis. Discussed starting with steroid and adding antibiotic if not improving in several days.   - amoxicillin-clavulanate 875-125mg (AUGMENTIN) 875-125 mg per tablet; Take 1 tablet by mouth every 12 (twelve) hours. for 7 days  Dispense: 14 tablet; Refill: 0  - methylPREDNISolone (MEDROL DOSEPACK) 4 mg tablet; use as directed  Dispense: 1 Package; Refill: 0    2. Allergic rhinoconjunctivitis  - azelastine (ASTELIN) 137 mcg (0.1 %) nasal spray; 1 spray (137 mcg total) by Nasal route 2 (two) times daily.  Dispense: 30 mL; Refill: 1    ______________________________________________________________________  Subjective:    Chief Complaint:  Sinus complaints    HPI:  Nilesh is a 64 y.o. year old male with telemedicine visit today as consultation for sinus complaints    The patient location is: home  The chief complaint leading to consultation is: sinus complaints  Visit type: Virtual visit with synchronous audio and video  Total time spent with patient: 9:25-9:33  Each patient to whom he or she provides medical services by telemedicine is:  (1) informed of the relationship between the physician and patient and the respective role of any other health care provider with respect to management of the patient; and (2) notified that he or she may decline to receive medical services by telemedicine and may withdraw from such care at any time.    With the recent pollen he has been having usual allergy symptoms for him. Allegra 180 every day and astelin every night. Prior to this week has been working. Yesterday started getting tickle in throat and a lot of frontal sinus pressure. Some body aches, muscle aches. Has not been having fever and he has been checking his temperature. Notes that this seems to happen every year and taking steroid and antibiotic  knocks this out.     Medications:  Current Outpatient Medications on File Prior to Visit   Medication Sig Dispense Refill    atorvastatin (LIPITOR) 40 MG tablet Take 1 tablet (40 mg total) by mouth nightly. 90 tablet 1    fexofenadine (ALLEGRA) 180 MG tablet Take 1 tablet (180 mg total) by mouth once daily. 90 tablet 1    hyoscyamine (LEVSIN/SL) 0.125 mg Subl Place 1 tablet (0.125 mg total) under the tongue every 4 (four) hours as needed. 10 tablet 0    lisinopril-hydrochlorothiazide (PRINZIDE,ZESTORETIC) 20-25 mg Tab Take 1 tablet by mouth once daily. 90 tablet 1    metoprolol succinate (TOPROL-XL) 50 MG 24 hr tablet Take 1 tablet (50 mg total) by mouth every evening. 90 tablet 0    pantoprazole (PROTONIX) 40 MG tablet Take 1 tablet (40 mg total) by mouth once daily. 90 tablet 1    sertraline (ZOLOFT) 100 MG tablet Take 1 tablet (100 mg total) by mouth once daily. 90 tablet 1    sildenafil (VIAGRA) 100 MG tablet Take 1 tablet (100 mg total) by mouth daily as needed for Erectile Dysfunction. 10 tablet 11    [DISCONTINUED] azelastine (ASTELIN) 137 mcg (0.1 %) nasal spray 1 spray (137 mcg total) by Nasal route 2 (two) times daily. 30 mL 1     No current facility-administered medications on file prior to visit.        Review of Systems:  Review of Systems   Constitutional: Negative for chills and fever.   HENT: Positive for congestion, sinus pressure, sinus pain and sore throat.    Respiratory: Negative for cough, shortness of breath and wheezing.    Allergic/Immunologic: Positive for environmental allergies.       Past Medical History:  Past Medical History:   Diagnosis Date    Allergic rhinosinusitis     Anxiety     Back pain     with spasms    Diverticulitis     s/p partial colectomy    GERD (gastroesophageal reflux disease)     Hyperlipidemia     Hypertension        Objective:    Vitals:  Vitals:    03/18/20 0933   BP: (!) 140/90   Pulse: 61   Resp: 16   Temp: 97.8 °F (36.6 °C)       Physical Exam    Constitutional: He is oriented to person, place, and time. He appears well-developed and well-nourished. No distress.   HENT:   Head: Normocephalic and atraumatic.   Pulmonary/Chest: Effort normal. No respiratory distress.   No coughing, sounds congested   Neurological: He is alert and oriented to person, place, and time.   Skin: He is not diaphoretic.   Psychiatric: He has a normal mood and affect. His behavior is normal. Judgment and thought content normal.       Flavia Lange MD  Internal Medicine

## 2020-04-10 ENCOUNTER — PATIENT MESSAGE (OUTPATIENT)
Dept: FAMILY MEDICINE | Facility: CLINIC | Age: 65
End: 2020-04-10

## 2020-05-06 ENCOUNTER — PATIENT MESSAGE (OUTPATIENT)
Dept: ADMINISTRATIVE | Facility: HOSPITAL | Age: 65
End: 2020-05-06

## 2020-05-07 ENCOUNTER — TELEPHONE (OUTPATIENT)
Dept: FAMILY MEDICINE | Facility: CLINIC | Age: 65
End: 2020-05-07

## 2020-05-07 NOTE — TELEPHONE ENCOUNTER
Spoke with patient and addressed all of his concerns in detail. Discussed different types of tests. He will wait for the test that was already ordered.

## 2020-05-07 NOTE — TELEPHONE ENCOUNTER
Spoke with pt. 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 Pt now concerned since he had recent exposure to her. He stated that today he went to the [facility] in [place] and got tested, but that the results will take 3-5 days. Pt does not want to wait that long for results d/t possibly exposing his family and other clients. Pt reports to be asymptomatic at this time.    Pt wondering if there is a rapid testing he could have done to receive sooner results. Please review and advise.

## 2020-05-07 NOTE — TELEPHONE ENCOUNTER
----- Message from Shavonne Perrin MA sent at 5/7/2020 11:40 AM CDT -----  Contact: patient  Type: Needs Medical Advice  Who Called:  Nilesh  Timmy Call Back Number:   Additional Information: patient has direct exposure to Covid-19 and would like to be tested.  He would like to go to Urgent Care to have testing.  Please call to advise

## 2020-05-07 NOTE — TELEPHONE ENCOUNTER
----- Message from Hao Navas MA sent at 5/7/2020  2:01 PM CDT -----  Type: Needs Medical Advice    Who Called:  Patient   Patient was adamant that he received a call from Dr. Lange's office and that someone call him back. He does not know who called or why.   Best Call Back Number: [phone]

## 2020-05-16 ENCOUNTER — PATIENT MESSAGE (OUTPATIENT)
Dept: FAMILY MEDICINE | Facility: CLINIC | Age: 65
End: 2020-05-16

## 2020-05-16 DIAGNOSIS — K21.9 GASTROESOPHAGEAL REFLUX DISEASE, ESOPHAGITIS PRESENCE NOT SPECIFIED: ICD-10-CM

## 2020-05-16 DIAGNOSIS — I10 ESSENTIAL HYPERTENSION: ICD-10-CM

## 2020-05-16 DIAGNOSIS — E78.2 MIXED HYPERLIPIDEMIA: ICD-10-CM

## 2020-05-16 DIAGNOSIS — F41.9 CHRONIC ANXIETY: ICD-10-CM

## 2020-05-18 RX ORDER — PANTOPRAZOLE SODIUM 40 MG/1
40 TABLET, DELAYED RELEASE ORAL DAILY
Qty: 90 TABLET | Refills: 1 | Status: SHIPPED | OUTPATIENT
Start: 2020-05-18 | End: 2020-10-23

## 2020-05-18 RX ORDER — SERTRALINE HYDROCHLORIDE 100 MG/1
100 TABLET, FILM COATED ORAL DAILY
Qty: 90 TABLET | Refills: 1 | Status: SHIPPED | OUTPATIENT
Start: 2020-05-18 | End: 2020-10-23

## 2020-05-18 RX ORDER — LISINOPRIL AND HYDROCHLOROTHIAZIDE 20; 25 MG/1; MG/1
1 TABLET ORAL DAILY
Qty: 90 TABLET | Refills: 1 | Status: SHIPPED | OUTPATIENT
Start: 2020-05-18 | End: 2020-10-23

## 2020-05-18 RX ORDER — ATORVASTATIN CALCIUM 40 MG/1
40 TABLET, FILM COATED ORAL NIGHTLY
Qty: 90 TABLET | Refills: 1 | Status: SHIPPED | OUTPATIENT
Start: 2020-05-18 | End: 2020-10-23

## 2020-05-18 RX ORDER — METOPROLOL SUCCINATE 50 MG/1
50 TABLET, EXTENDED RELEASE ORAL NIGHTLY
Qty: 90 TABLET | Refills: 0 | Status: SHIPPED | OUTPATIENT
Start: 2020-05-18 | End: 2020-08-17

## 2020-05-22 ENCOUNTER — LAB VISIT (OUTPATIENT)
Dept: LAB | Facility: HOSPITAL | Age: 65
End: 2020-05-22
Attending: INTERNAL MEDICINE
Payer: MEDICARE

## 2020-05-22 DIAGNOSIS — K21.9 GASTROESOPHAGEAL REFLUX DISEASE, ESOPHAGITIS PRESENCE NOT SPECIFIED: ICD-10-CM

## 2020-05-22 DIAGNOSIS — E78.2 MIXED HYPERLIPIDEMIA: ICD-10-CM

## 2020-05-22 DIAGNOSIS — I10 ESSENTIAL HYPERTENSION: ICD-10-CM

## 2020-05-22 DIAGNOSIS — Z00.00 PREVENTATIVE HEALTH CARE: ICD-10-CM

## 2020-05-22 DIAGNOSIS — R73.03 PREDIABETES: ICD-10-CM

## 2020-05-22 LAB
ALBUMIN SERPL BCP-MCNC: 3.9 G/DL (ref 3.5–5.2)
ALP SERPL-CCNC: 101 U/L (ref 55–135)
ALT SERPL W/O P-5'-P-CCNC: 37 U/L (ref 10–44)
ANION GAP SERPL CALC-SCNC: 9 MMOL/L (ref 8–16)
AST SERPL-CCNC: 27 U/L (ref 10–40)
BASOPHILS # BLD AUTO: 0.08 K/UL (ref 0–0.2)
BASOPHILS NFR BLD: 0.9 % (ref 0–1.9)
BILIRUB SERPL-MCNC: 0.4 MG/DL (ref 0.1–1)
BUN SERPL-MCNC: 26 MG/DL (ref 8–23)
CALCIUM SERPL-MCNC: 9.5 MG/DL (ref 8.7–10.5)
CHLORIDE SERPL-SCNC: 100 MMOL/L (ref 95–110)
CHOLEST SERPL-MCNC: 223 MG/DL (ref 120–199)
CHOLEST/HDLC SERPL: 4.2 {RATIO} (ref 2–5)
CO2 SERPL-SCNC: 31 MMOL/L (ref 23–29)
CREAT SERPL-MCNC: 1.1 MG/DL (ref 0.5–1.4)
DIFFERENTIAL METHOD: ABNORMAL
EOSINOPHIL # BLD AUTO: 0.1 K/UL (ref 0–0.5)
EOSINOPHIL NFR BLD: 1.7 % (ref 0–8)
ERYTHROCYTE [DISTWIDTH] IN BLOOD BY AUTOMATED COUNT: 13.9 % (ref 11.5–14.5)
EST. GFR  (AFRICAN AMERICAN): >60 ML/MIN/1.73 M^2
EST. GFR  (NON AFRICAN AMERICAN): >60 ML/MIN/1.73 M^2
ESTIMATED AVG GLUCOSE: 134 MG/DL (ref 68–131)
GLUCOSE SERPL-MCNC: 93 MG/DL (ref 70–110)
HBA1C MFR BLD HPLC: 6.3 % (ref 4–5.6)
HCT VFR BLD AUTO: 44.1 % (ref 40–54)
HDLC SERPL-MCNC: 53 MG/DL (ref 40–75)
HDLC SERPL: 23.8 % (ref 20–50)
HGB BLD-MCNC: 13.4 G/DL (ref 14–18)
IMM GRANULOCYTES # BLD AUTO: 0.03 K/UL (ref 0–0.04)
IMM GRANULOCYTES NFR BLD AUTO: 0.4 % (ref 0–0.5)
LDLC SERPL CALC-MCNC: 123.8 MG/DL (ref 63–159)
LYMPHOCYTES # BLD AUTO: 2.7 K/UL (ref 1–4.8)
LYMPHOCYTES NFR BLD: 31.4 % (ref 18–48)
MCH RBC QN AUTO: 30.5 PG (ref 27–31)
MCHC RBC AUTO-ENTMCNC: 30.4 G/DL (ref 32–36)
MCV RBC AUTO: 101 FL (ref 82–98)
MONOCYTES # BLD AUTO: 0.6 K/UL (ref 0.3–1)
MONOCYTES NFR BLD: 7.4 % (ref 4–15)
NEUTROPHILS # BLD AUTO: 4.9 K/UL (ref 1.8–7.7)
NEUTROPHILS NFR BLD: 58.2 % (ref 38–73)
NONHDLC SERPL-MCNC: 170 MG/DL
NRBC BLD-RTO: 0 /100 WBC
PLATELET # BLD AUTO: 240 K/UL (ref 150–350)
PMV BLD AUTO: 10.5 FL (ref 9.2–12.9)
POTASSIUM SERPL-SCNC: 4.7 MMOL/L (ref 3.5–5.1)
PROT SERPL-MCNC: 7.2 G/DL (ref 6–8.4)
RBC # BLD AUTO: 4.39 M/UL (ref 4.6–6.2)
SODIUM SERPL-SCNC: 140 MMOL/L (ref 136–145)
TRIGL SERPL-MCNC: 231 MG/DL (ref 30–150)
WBC # BLD AUTO: 8.47 K/UL (ref 3.9–12.7)

## 2020-05-22 PROCEDURE — 80061 LIPID PANEL: CPT

## 2020-05-22 PROCEDURE — 85025 COMPLETE CBC W/AUTO DIFF WBC: CPT

## 2020-05-22 PROCEDURE — 36415 COLL VENOUS BLD VENIPUNCTURE: CPT | Mod: PN

## 2020-05-22 PROCEDURE — 83036 HEMOGLOBIN GLYCOSYLATED A1C: CPT

## 2020-05-22 PROCEDURE — 80053 COMPREHEN METABOLIC PANEL: CPT

## 2020-10-01 ENCOUNTER — PATIENT MESSAGE (OUTPATIENT)
Dept: OTHER | Facility: OTHER | Age: 65
End: 2020-10-01

## 2020-10-23 DIAGNOSIS — K21.9 GASTROESOPHAGEAL REFLUX DISEASE: ICD-10-CM

## 2020-10-23 DIAGNOSIS — E78.2 MIXED HYPERLIPIDEMIA: ICD-10-CM

## 2020-10-23 DIAGNOSIS — F41.9 CHRONIC ANXIETY: ICD-10-CM

## 2020-10-23 DIAGNOSIS — I10 ESSENTIAL HYPERTENSION: ICD-10-CM

## 2020-10-23 DIAGNOSIS — R73.03 PREDIABETES: Primary | ICD-10-CM

## 2020-10-23 RX ORDER — PANTOPRAZOLE SODIUM 40 MG/1
TABLET, DELAYED RELEASE ORAL
Qty: 90 TABLET | Refills: 0 | Status: SHIPPED | OUTPATIENT
Start: 2020-10-23 | End: 2021-02-23 | Stop reason: SDUPTHER

## 2020-10-23 RX ORDER — ATORVASTATIN CALCIUM 40 MG/1
TABLET, FILM COATED ORAL
Qty: 90 TABLET | Refills: 0 | Status: SHIPPED | OUTPATIENT
Start: 2020-10-23 | End: 2021-02-23 | Stop reason: SDUPTHER

## 2020-10-23 RX ORDER — SERTRALINE HYDROCHLORIDE 100 MG/1
TABLET, FILM COATED ORAL
Qty: 90 TABLET | Refills: 0 | Status: SHIPPED | OUTPATIENT
Start: 2020-10-23 | End: 2021-02-23 | Stop reason: SDUPTHER

## 2020-10-23 RX ORDER — LISINOPRIL AND HYDROCHLOROTHIAZIDE 20; 25 MG/1; MG/1
TABLET ORAL
Qty: 90 TABLET | Refills: 0 | Status: SHIPPED | OUTPATIENT
Start: 2020-10-23 | End: 2021-02-23 | Stop reason: SDUPTHER

## 2020-10-23 RX ORDER — METOPROLOL SUCCINATE 50 MG/1
TABLET, EXTENDED RELEASE ORAL
Qty: 90 TABLET | Refills: 0 | Status: SHIPPED | OUTPATIENT
Start: 2020-10-23 | End: 2021-02-23 | Stop reason: SDUPTHER

## 2020-10-27 ENCOUNTER — PATIENT MESSAGE (OUTPATIENT)
Dept: FAMILY MEDICINE | Facility: CLINIC | Age: 65
End: 2020-10-27

## 2020-10-30 ENCOUNTER — LAB VISIT (OUTPATIENT)
Dept: LAB | Facility: HOSPITAL | Age: 65
End: 2020-10-30
Attending: INTERNAL MEDICINE
Payer: MEDICARE

## 2020-10-30 DIAGNOSIS — R73.03 PREDIABETES: ICD-10-CM

## 2020-10-30 DIAGNOSIS — I10 ESSENTIAL HYPERTENSION: ICD-10-CM

## 2020-10-30 DIAGNOSIS — E78.2 MIXED HYPERLIPIDEMIA: ICD-10-CM

## 2020-10-30 LAB
ALBUMIN SERPL BCP-MCNC: 4 G/DL (ref 3.5–5.2)
ALP SERPL-CCNC: 109 U/L (ref 55–135)
ALT SERPL W/O P-5'-P-CCNC: 44 U/L (ref 10–44)
ANION GAP SERPL CALC-SCNC: 10 MMOL/L (ref 8–16)
AST SERPL-CCNC: 32 U/L (ref 10–40)
BILIRUB SERPL-MCNC: 0.4 MG/DL (ref 0.1–1)
BUN SERPL-MCNC: 19 MG/DL (ref 8–23)
CALCIUM SERPL-MCNC: 9.1 MG/DL (ref 8.7–10.5)
CHLORIDE SERPL-SCNC: 100 MMOL/L (ref 95–110)
CO2 SERPL-SCNC: 32 MMOL/L (ref 23–29)
CREAT SERPL-MCNC: 1 MG/DL (ref 0.5–1.4)
EST. GFR  (AFRICAN AMERICAN): >60 ML/MIN/1.73 M^2
EST. GFR  (NON AFRICAN AMERICAN): >60 ML/MIN/1.73 M^2
ESTIMATED AVG GLUCOSE: 126 MG/DL (ref 68–131)
GLUCOSE SERPL-MCNC: 120 MG/DL (ref 70–110)
HBA1C MFR BLD HPLC: 6 % (ref 4–5.6)
POTASSIUM SERPL-SCNC: 3.9 MMOL/L (ref 3.5–5.1)
PROT SERPL-MCNC: 7.2 G/DL (ref 6–8.4)
SODIUM SERPL-SCNC: 142 MMOL/L (ref 136–145)

## 2020-10-30 PROCEDURE — 36415 COLL VENOUS BLD VENIPUNCTURE: CPT | Mod: PN

## 2020-10-30 PROCEDURE — 83036 HEMOGLOBIN GLYCOSYLATED A1C: CPT

## 2020-10-30 PROCEDURE — 80053 COMPREHEN METABOLIC PANEL: CPT

## 2020-11-06 ENCOUNTER — OFFICE VISIT (OUTPATIENT)
Dept: FAMILY MEDICINE | Facility: CLINIC | Age: 65
End: 2020-11-06
Payer: MEDICARE

## 2020-11-06 VITALS
RESPIRATION RATE: 18 BRPM | HEART RATE: 80 BPM | HEIGHT: 73 IN | DIASTOLIC BLOOD PRESSURE: 82 MMHG | BODY MASS INDEX: 36.31 KG/M2 | SYSTOLIC BLOOD PRESSURE: 138 MMHG | TEMPERATURE: 98 F | WEIGHT: 273.94 LBS | OXYGEN SATURATION: 95 %

## 2020-11-06 DIAGNOSIS — N52.9 ERECTILE DYSFUNCTION, UNSPECIFIED ERECTILE DYSFUNCTION TYPE: ICD-10-CM

## 2020-11-06 DIAGNOSIS — Z23 NEED FOR VACCINATION: ICD-10-CM

## 2020-11-06 DIAGNOSIS — I10 ESSENTIAL HYPERTENSION: Primary | ICD-10-CM

## 2020-11-06 DIAGNOSIS — E78.2 MIXED HYPERLIPIDEMIA: ICD-10-CM

## 2020-11-06 DIAGNOSIS — K21.9 GASTROESOPHAGEAL REFLUX DISEASE, UNSPECIFIED WHETHER ESOPHAGITIS PRESENT: ICD-10-CM

## 2020-11-06 DIAGNOSIS — F41.9 CHRONIC ANXIETY: ICD-10-CM

## 2020-11-06 DIAGNOSIS — R73.03 PREDIABETES: ICD-10-CM

## 2020-11-06 PROCEDURE — 99214 OFFICE O/P EST MOD 30 MIN: CPT | Mod: S$PBB,,, | Performed by: INTERNAL MEDICINE

## 2020-11-06 PROCEDURE — 99999 PR PBB SHADOW E&M-EST. PATIENT-LVL IV: CPT | Mod: PBBFAC,,, | Performed by: INTERNAL MEDICINE

## 2020-11-06 PROCEDURE — 99214 OFFICE O/P EST MOD 30 MIN: CPT | Mod: PBBFAC,PN | Performed by: INTERNAL MEDICINE

## 2020-11-06 PROCEDURE — 99214 PR OFFICE/OUTPT VISIT, EST, LEVL IV, 30-39 MIN: ICD-10-PCS | Mod: S$PBB,,, | Performed by: INTERNAL MEDICINE

## 2020-11-06 PROCEDURE — 99999 PR PBB SHADOW E&M-EST. PATIENT-LVL IV: ICD-10-PCS | Mod: PBBFAC,,, | Performed by: INTERNAL MEDICINE

## 2020-11-06 RX ORDER — SILDENAFIL 100 MG/1
100 TABLET, FILM COATED ORAL DAILY PRN
Qty: 10 TABLET | Refills: 11 | Status: SHIPPED | OUTPATIENT
Start: 2020-11-06 | End: 2021-11-22 | Stop reason: SDUPTHER

## 2020-11-06 RX ORDER — ZOSTER VACCINE RECOMBINANT, ADJUVANTED 50 MCG/0.5
0.5 KIT INTRAMUSCULAR ONCE
Qty: 1 EACH | Refills: 1 | Status: SHIPPED | OUTPATIENT
Start: 2020-11-06 | End: 2020-11-06

## 2020-11-06 NOTE — PROGRESS NOTES
Assessment and Plan:    1. Essential hypertension  Controlled, continue current medications.    2. Chronic anxiety  Controlled, continue current medications.    3. Mixed hyperlipidemia  Controlled, continue current medications.    4. Prediabetes  Improving with diet changes. Discussed possible metformin, would hold off due to chronic diarrhea.    5. Gastroesophageal reflux disease, unspecified whether esophagitis present  Controlled, continue current medications.    6. Erectile dysfunction, unspecified erectile dysfunction type  - sildenafiL (VIAGRA) 100 MG tablet; Take 1 tablet (100 mg total) by mouth daily as needed for Erectile Dysfunction.  Dispense: 10 tablet; Refill: 11    7. Need for vaccination  Declines flu vaccine  - varicella-zoster [id number], PF, (SHINGRIX, PF,) 50 mcg/0.5 mL injection; Inject 0.5 mLs into the muscle once. for 1 dose  Dispense: 1 each; Refill: 1      ______________________________________________________________________  Subjective:    Chief Complaint:  Follow up chronic medical conditions.     HPI:  Nilesh is a 65 y.o. year old male here for follow up of chronic medical conditions.     HTN- Takes lisinopril, HCTZ, and metoprolol for BP. Has historically been at goal on this. No problems with the medications, BP has been controlled.      HLD- Takes atorvastatin, no problems with this.     PreDM- A1c had gone up to 6.3, now down to 6.1. Has been working on diet. Less wine.     Chronic anxiety- Takes sertraline and doing well with this. Having some diarrhea which may be related, but states this is longstanding, would like to stay on med.     GERD- Taking PPI and not having symptoms at this time.        Medications:  Current Outpatient Medications on File Prior to Visit   Medication Sig Dispense Refill    atorvastatin (LIPITOR) 40 MG tablet TAKE 1 TABLET NIGHTLY 90 tablet 0    azelastine (ASTELIN) 137 mcg (0.1 %) nasal spray 1 spray (137 mcg total) by Nasal route 2 (two) times daily. 30 mL  1    lisinopriL-hydrochlorothiazide (PRINZIDE,ZESTORETIC) 20-25 mg Tab TAKE 1 TABLET ONCE DAILY 90 tablet 0    metoprolol succinate (TOPROL-XL) 50 MG 24 hr tablet TAKE 1 TABLET EVERY EVENING 90 tablet 0    pantoprazole (PROTONIX) 40 MG tablet TAKE 1 TABLET ONCE DAILY 90 tablet 0    sertraline (ZOLOFT) 100 MG tablet TAKE 1 TABLET ONCE DAILY 90 tablet 0    sildenafil (VIAGRA) 100 MG tablet Take 1 tablet (100 mg total) by mouth daily as needed for Erectile Dysfunction. 10 tablet 11    [DISCONTINUED] fexofenadine (ALLEGRA) 180 MG tablet Take 1 tablet (180 mg total) by mouth once daily. (Patient not taking: Reported on 11/6/2020) 90 tablet 1    [DISCONTINUED] hyoscyamine (LEVSIN/SL) 0.125 mg Subl Place 1 tablet (0.125 mg total) under the tongue every 4 (four) hours as needed. (Patient not taking: Reported on 11/6/2020) 10 tablet 0     No current facility-administered medications on file prior to visit.        Review of Systems:  Review of Systems   Constitutional: Negative for activity change and unexpected weight change.   HENT: Negative for hearing loss, rhinorrhea and trouble swallowing.    Eyes: Negative for discharge and visual disturbance.   Respiratory: Negative for chest tightness and wheezing.    Cardiovascular: Negative for chest pain and palpitations.   Gastrointestinal: Negative for blood in stool, constipation, diarrhea and vomiting.   Endocrine: Negative for polydipsia and polyuria.   Genitourinary: Negative for difficulty urinating, hematuria and urgency.   Musculoskeletal: Negative for arthralgias, joint swelling and neck pain.   Neurological: Negative for weakness and headaches.   Psychiatric/Behavioral: Negative for confusion and dysphoric mood.       Past Medical History:  Past Medical History:   Diagnosis Date    Allergic rhinosinusitis     Anxiety     Back pain     with spasms    Diverticulitis     s/p partial colectomy    GERD (gastroesophageal reflux disease)     Hyperlipidemia      "Hypertension        Objective:    Vitals:  Vitals:    11/06/20 1419   BP: 138/82   Pulse: 80   Resp: 18   Temp: 98.1 °F (36.7 °C)   TempSrc: Temporal   SpO2: 95%   Weight: 124.2 kg (273 lb 14.7 oz)   Height: 6' 1" (1.854 m)   PainSc: 0-No pain       Physical Exam  Vitals signs reviewed.   Constitutional:       General: He is not in acute distress.     Appearance: He is well-developed.   Eyes:      Conjunctiva/sclera: Conjunctivae normal.   Cardiovascular:      Rate and Rhythm: Normal rate and regular rhythm.   Pulmonary:      Effort: Pulmonary effort is normal. No respiratory distress.   Musculoskeletal:      Right lower leg: No edema.      Left lower leg: No edema.   Skin:     General: Skin is warm and dry.   Neurological:      Mental Status: He is alert and oriented to person, place, and time.   Psychiatric:         Behavior: Behavior normal.         Data:  Previous labs reviewed and pertinent for A1c 6.0.      Flavia Lange MD  Internal Medicine    "

## 2020-11-21 DIAGNOSIS — J30.9 ALLERGIC RHINOCONJUNCTIVITIS: ICD-10-CM

## 2020-11-21 DIAGNOSIS — H10.10 ALLERGIC RHINOCONJUNCTIVITIS: ICD-10-CM

## 2020-11-23 RX ORDER — AZELASTINE 1 MG/ML
SPRAY, METERED NASAL
Qty: 30 ML | Refills: 1 | Status: SHIPPED | OUTPATIENT
Start: 2020-11-23 | End: 2020-12-08

## 2020-12-07 DIAGNOSIS — H10.10 ALLERGIC RHINOCONJUNCTIVITIS: ICD-10-CM

## 2020-12-07 DIAGNOSIS — J30.9 ALLERGIC RHINOCONJUNCTIVITIS: ICD-10-CM

## 2020-12-08 RX ORDER — AZELASTINE 1 MG/ML
SPRAY, METERED NASAL
Qty: 30 ML | Refills: 1 | Status: SHIPPED | OUTPATIENT
Start: 2020-12-08 | End: 2021-05-10

## 2020-12-11 ENCOUNTER — PATIENT MESSAGE (OUTPATIENT)
Dept: OTHER | Facility: OTHER | Age: 65
End: 2020-12-11

## 2020-12-15 ENCOUNTER — PATIENT MESSAGE (OUTPATIENT)
Dept: FAMILY MEDICINE | Facility: CLINIC | Age: 65
End: 2020-12-15

## 2021-02-10 ENCOUNTER — PATIENT MESSAGE (OUTPATIENT)
Dept: FAMILY MEDICINE | Facility: CLINIC | Age: 66
End: 2021-02-10

## 2021-02-22 ENCOUNTER — PATIENT MESSAGE (OUTPATIENT)
Dept: FAMILY MEDICINE | Facility: CLINIC | Age: 66
End: 2021-02-22

## 2021-02-22 DIAGNOSIS — E78.2 MIXED HYPERLIPIDEMIA: ICD-10-CM

## 2021-02-22 DIAGNOSIS — F41.9 CHRONIC ANXIETY: ICD-10-CM

## 2021-02-22 DIAGNOSIS — K21.9 GASTROESOPHAGEAL REFLUX DISEASE: ICD-10-CM

## 2021-02-22 DIAGNOSIS — I10 ESSENTIAL HYPERTENSION: ICD-10-CM

## 2021-02-23 RX ORDER — LISINOPRIL AND HYDROCHLOROTHIAZIDE 20; 25 MG/1; MG/1
1 TABLET ORAL DAILY
Qty: 90 TABLET | Refills: 0 | Status: SHIPPED | OUTPATIENT
Start: 2021-02-23 | End: 2021-05-14

## 2021-02-23 RX ORDER — SERTRALINE HYDROCHLORIDE 100 MG/1
100 TABLET, FILM COATED ORAL DAILY
Qty: 90 TABLET | Refills: 0 | Status: SHIPPED | OUTPATIENT
Start: 2021-02-23 | End: 2021-05-13

## 2021-02-23 RX ORDER — ATORVASTATIN CALCIUM 40 MG/1
40 TABLET, FILM COATED ORAL NIGHTLY
Qty: 90 TABLET | Refills: 0 | Status: SHIPPED | OUTPATIENT
Start: 2021-02-23 | End: 2021-05-13

## 2021-02-23 RX ORDER — PANTOPRAZOLE SODIUM 40 MG/1
40 TABLET, DELAYED RELEASE ORAL DAILY
Qty: 90 TABLET | Refills: 0 | Status: SHIPPED | OUTPATIENT
Start: 2021-02-23 | End: 2021-05-14

## 2021-02-23 RX ORDER — METOPROLOL SUCCINATE 50 MG/1
50 TABLET, EXTENDED RELEASE ORAL NIGHTLY
Qty: 90 TABLET | Refills: 0 | Status: SHIPPED | OUTPATIENT
Start: 2021-02-23 | End: 2021-05-13

## 2021-05-10 DIAGNOSIS — J30.9 ALLERGIC RHINOCONJUNCTIVITIS: ICD-10-CM

## 2021-05-10 DIAGNOSIS — H10.10 ALLERGIC RHINOCONJUNCTIVITIS: ICD-10-CM

## 2021-05-10 RX ORDER — AZELASTINE 1 MG/ML
SPRAY, METERED NASAL
Qty: 30 ML | Refills: 1 | Status: SHIPPED | OUTPATIENT
Start: 2021-05-10 | End: 2021-06-14

## 2021-05-13 ENCOUNTER — PATIENT MESSAGE (OUTPATIENT)
Dept: FAMILY MEDICINE | Facility: CLINIC | Age: 66
End: 2021-05-13

## 2021-05-13 DIAGNOSIS — E78.2 MIXED HYPERLIPIDEMIA: ICD-10-CM

## 2021-05-13 DIAGNOSIS — R73.03 PREDIABETES: ICD-10-CM

## 2021-05-13 DIAGNOSIS — I10 ESSENTIAL HYPERTENSION: Primary | ICD-10-CM

## 2021-05-13 DIAGNOSIS — F41.9 CHRONIC ANXIETY: ICD-10-CM

## 2021-05-13 DIAGNOSIS — I10 ESSENTIAL HYPERTENSION: ICD-10-CM

## 2021-05-13 RX ORDER — SERTRALINE HYDROCHLORIDE 100 MG/1
TABLET, FILM COATED ORAL
Qty: 90 TABLET | Refills: 0 | Status: SHIPPED | OUTPATIENT
Start: 2021-05-13 | End: 2021-08-09

## 2021-05-13 RX ORDER — ATORVASTATIN CALCIUM 40 MG/1
TABLET, FILM COATED ORAL
Qty: 90 TABLET | Refills: 0 | Status: SHIPPED | OUTPATIENT
Start: 2021-05-13 | End: 2021-08-09

## 2021-05-13 RX ORDER — METOPROLOL SUCCINATE 50 MG/1
TABLET, EXTENDED RELEASE ORAL
Qty: 90 TABLET | Refills: 0 | Status: SHIPPED | OUTPATIENT
Start: 2021-05-13 | End: 2021-08-27

## 2021-05-14 ENCOUNTER — PATIENT MESSAGE (OUTPATIENT)
Dept: FAMILY MEDICINE | Facility: CLINIC | Age: 66
End: 2021-05-14

## 2021-05-18 ENCOUNTER — LAB VISIT (OUTPATIENT)
Dept: LAB | Facility: HOSPITAL | Age: 66
End: 2021-05-18
Attending: INTERNAL MEDICINE
Payer: MEDICARE

## 2021-05-18 DIAGNOSIS — I10 ESSENTIAL HYPERTENSION: ICD-10-CM

## 2021-05-18 DIAGNOSIS — R73.03 PREDIABETES: ICD-10-CM

## 2021-05-18 LAB
ALBUMIN SERPL BCP-MCNC: 3.7 G/DL (ref 3.5–5.2)
ALP SERPL-CCNC: 97 U/L (ref 55–135)
ALT SERPL W/O P-5'-P-CCNC: 38 U/L (ref 10–44)
ANION GAP SERPL CALC-SCNC: 8 MMOL/L (ref 8–16)
AST SERPL-CCNC: 31 U/L (ref 10–40)
BILIRUB SERPL-MCNC: 0.4 MG/DL (ref 0.1–1)
BUN SERPL-MCNC: 20 MG/DL (ref 8–23)
CALCIUM SERPL-MCNC: 9.5 MG/DL (ref 8.7–10.5)
CHLORIDE SERPL-SCNC: 101 MMOL/L (ref 95–110)
CHOLEST SERPL-MCNC: 204 MG/DL (ref 120–199)
CHOLEST/HDLC SERPL: 3.9 {RATIO} (ref 2–5)
CO2 SERPL-SCNC: 33 MMOL/L (ref 23–29)
CREAT SERPL-MCNC: 0.9 MG/DL (ref 0.5–1.4)
EST. GFR  (AFRICAN AMERICAN): >60 ML/MIN/1.73 M^2
EST. GFR  (NON AFRICAN AMERICAN): >60 ML/MIN/1.73 M^2
ESTIMATED AVG GLUCOSE: 128 MG/DL (ref 68–131)
GLUCOSE SERPL-MCNC: 105 MG/DL (ref 70–110)
HBA1C MFR BLD: 6.1 % (ref 4–5.6)
HDLC SERPL-MCNC: 52 MG/DL (ref 40–75)
HDLC SERPL: 25.5 % (ref 20–50)
LDLC SERPL CALC-MCNC: 111.6 MG/DL (ref 63–159)
NONHDLC SERPL-MCNC: 152 MG/DL
POTASSIUM SERPL-SCNC: 4.4 MMOL/L (ref 3.5–5.1)
PROT SERPL-MCNC: 6.8 G/DL (ref 6–8.4)
SODIUM SERPL-SCNC: 142 MMOL/L (ref 136–145)
TRIGL SERPL-MCNC: 202 MG/DL (ref 30–150)

## 2021-05-18 PROCEDURE — 80061 LIPID PANEL: CPT | Performed by: INTERNAL MEDICINE

## 2021-05-18 PROCEDURE — 36415 COLL VENOUS BLD VENIPUNCTURE: CPT | Mod: PN | Performed by: INTERNAL MEDICINE

## 2021-05-18 PROCEDURE — 83036 HEMOGLOBIN GLYCOSYLATED A1C: CPT | Performed by: INTERNAL MEDICINE

## 2021-05-18 PROCEDURE — 80053 COMPREHEN METABOLIC PANEL: CPT | Performed by: INTERNAL MEDICINE

## 2021-05-21 ENCOUNTER — OFFICE VISIT (OUTPATIENT)
Dept: FAMILY MEDICINE | Facility: CLINIC | Age: 66
End: 2021-05-21
Payer: MEDICARE

## 2021-05-21 VITALS
BODY MASS INDEX: 36.04 KG/M2 | HEART RATE: 72 BPM | WEIGHT: 271.94 LBS | RESPIRATION RATE: 16 BRPM | DIASTOLIC BLOOD PRESSURE: 82 MMHG | SYSTOLIC BLOOD PRESSURE: 136 MMHG | HEIGHT: 73 IN

## 2021-05-21 DIAGNOSIS — E78.2 MIXED HYPERLIPIDEMIA: ICD-10-CM

## 2021-05-21 DIAGNOSIS — I10 ESSENTIAL HYPERTENSION: Primary | ICD-10-CM

## 2021-05-21 DIAGNOSIS — F41.9 CHRONIC ANXIETY: ICD-10-CM

## 2021-05-21 DIAGNOSIS — R73.03 PREDIABETES: ICD-10-CM

## 2021-05-21 PROCEDURE — 99999 PR PBB SHADOW E&M-EST. PATIENT-LVL III: CPT | Mod: PBBFAC,,, | Performed by: INTERNAL MEDICINE

## 2021-05-21 PROCEDURE — 99213 OFFICE O/P EST LOW 20 MIN: CPT | Mod: PBBFAC,PN | Performed by: INTERNAL MEDICINE

## 2021-05-21 PROCEDURE — 99214 OFFICE O/P EST MOD 30 MIN: CPT | Mod: S$PBB,,, | Performed by: INTERNAL MEDICINE

## 2021-05-21 PROCEDURE — 99999 PR PBB SHADOW E&M-EST. PATIENT-LVL III: ICD-10-PCS | Mod: PBBFAC,,, | Performed by: INTERNAL MEDICINE

## 2021-05-21 PROCEDURE — 99214 PR OFFICE/OUTPT VISIT, EST, LEVL IV, 30-39 MIN: ICD-10-PCS | Mod: S$PBB,,, | Performed by: INTERNAL MEDICINE

## 2021-06-13 DIAGNOSIS — H10.10 ALLERGIC RHINOCONJUNCTIVITIS: ICD-10-CM

## 2021-06-13 DIAGNOSIS — J30.9 ALLERGIC RHINOCONJUNCTIVITIS: ICD-10-CM

## 2021-06-14 RX ORDER — AZELASTINE 1 MG/ML
SPRAY, METERED NASAL
Qty: 30 ML | Refills: 1 | Status: SHIPPED | OUTPATIENT
Start: 2021-06-14 | End: 2021-12-15 | Stop reason: SDUPTHER

## 2021-06-25 ENCOUNTER — PATIENT MESSAGE (OUTPATIENT)
Dept: FAMILY MEDICINE | Facility: CLINIC | Age: 66
End: 2021-06-25

## 2021-08-16 ENCOUNTER — OFFICE VISIT (OUTPATIENT)
Dept: URGENT CARE | Facility: CLINIC | Age: 66
End: 2021-08-16
Payer: MEDICARE

## 2021-08-16 VITALS
DIASTOLIC BLOOD PRESSURE: 86 MMHG | TEMPERATURE: 97 F | OXYGEN SATURATION: 96 % | SYSTOLIC BLOOD PRESSURE: 175 MMHG | BODY MASS INDEX: 35.92 KG/M2 | RESPIRATION RATE: 16 BRPM | HEIGHT: 73 IN | HEART RATE: 65 BPM | WEIGHT: 271 LBS

## 2021-08-16 DIAGNOSIS — J06.9 VIRAL URI: ICD-10-CM

## 2021-08-16 DIAGNOSIS — R09.81 SINUS CONGESTION: Primary | ICD-10-CM

## 2021-08-16 LAB
CTP QC/QA: YES
SARS-COV-2 RDRP RESP QL NAA+PROBE: NEGATIVE

## 2021-08-16 PROCEDURE — 99213 OFFICE O/P EST LOW 20 MIN: CPT | Mod: S$GLB,CS,, | Performed by: EMERGENCY MEDICINE

## 2021-08-16 PROCEDURE — 99213 PR OFFICE/OUTPT VISIT, EST, LEVL III, 20-29 MIN: ICD-10-PCS | Mod: S$GLB,CS,, | Performed by: EMERGENCY MEDICINE

## 2021-08-16 PROCEDURE — U0002 COVID-19 LAB TEST NON-CDC: HCPCS | Mod: QW,CR,S$GLB, | Performed by: EMERGENCY MEDICINE

## 2021-08-16 PROCEDURE — U0002: ICD-10-PCS | Mod: QW,CR,S$GLB, | Performed by: EMERGENCY MEDICINE

## 2021-11-19 ENCOUNTER — PATIENT OUTREACH (OUTPATIENT)
Dept: ADMINISTRATIVE | Facility: OTHER | Age: 66
End: 2021-11-19
Payer: MEDICARE

## 2021-11-22 ENCOUNTER — OFFICE VISIT (OUTPATIENT)
Dept: UROLOGY | Facility: CLINIC | Age: 66
End: 2021-11-22
Payer: MEDICARE

## 2021-11-22 ENCOUNTER — LAB VISIT (OUTPATIENT)
Dept: LAB | Facility: HOSPITAL | Age: 66
End: 2021-11-22
Attending: UROLOGY
Payer: MEDICARE

## 2021-11-22 VITALS — WEIGHT: 275.81 LBS | HEIGHT: 73 IN | BODY MASS INDEX: 36.56 KG/M2

## 2021-11-22 DIAGNOSIS — N52.9 IMPOTENCE: ICD-10-CM

## 2021-11-22 DIAGNOSIS — Z12.5 SCREENING FOR PROSTATE CANCER: Primary | ICD-10-CM

## 2021-11-22 DIAGNOSIS — Z12.5 SCREENING FOR PROSTATE CANCER: ICD-10-CM

## 2021-11-22 DIAGNOSIS — N40.0 BPH WITHOUT URINARY OBSTRUCTION: ICD-10-CM

## 2021-11-22 DIAGNOSIS — N52.9 ERECTILE DYSFUNCTION, UNSPECIFIED ERECTILE DYSFUNCTION TYPE: ICD-10-CM

## 2021-11-22 LAB — COMPLEXED PSA SERPL-MCNC: 0.31 NG/ML (ref 0–4)

## 2021-11-22 PROCEDURE — 36415 COLL VENOUS BLD VENIPUNCTURE: CPT | Mod: PO | Performed by: UROLOGY

## 2021-11-22 PROCEDURE — 99214 PR OFFICE/OUTPT VISIT, EST, LEVL IV, 30-39 MIN: ICD-10-PCS | Mod: S$PBB,,, | Performed by: UROLOGY

## 2021-11-22 PROCEDURE — 99214 OFFICE O/P EST MOD 30 MIN: CPT | Mod: S$PBB,,, | Performed by: UROLOGY

## 2021-11-22 PROCEDURE — 99999 PR PBB SHADOW E&M-EST. PATIENT-LVL III: ICD-10-PCS | Mod: PBBFAC,,, | Performed by: UROLOGY

## 2021-11-22 PROCEDURE — 99213 OFFICE O/P EST LOW 20 MIN: CPT | Mod: PBBFAC,PO | Performed by: UROLOGY

## 2021-11-22 PROCEDURE — 84153 ASSAY OF PSA TOTAL: CPT | Performed by: UROLOGY

## 2021-11-22 PROCEDURE — 99999 PR PBB SHADOW E&M-EST. PATIENT-LVL III: CPT | Mod: PBBFAC,,, | Performed by: UROLOGY

## 2021-11-22 RX ORDER — TADALAFIL 20 MG/1
20 TABLET ORAL DAILY
Qty: 10 TABLET | Refills: 11 | Status: SHIPPED | OUTPATIENT
Start: 2021-11-22 | End: 2021-11-22 | Stop reason: ALTCHOICE

## 2021-11-22 RX ORDER — SILDENAFIL 100 MG/1
100 TABLET, FILM COATED ORAL DAILY PRN
Qty: 10 TABLET | Refills: 11 | Status: SHIPPED | OUTPATIENT
Start: 2021-11-22 | End: 2023-01-12 | Stop reason: CLARIF

## 2021-11-24 ENCOUNTER — TELEPHONE (OUTPATIENT)
Dept: UROLOGY | Facility: CLINIC | Age: 66
End: 2021-11-24
Payer: MEDICARE

## 2021-12-02 DIAGNOSIS — K21.9 GASTROESOPHAGEAL REFLUX DISEASE: ICD-10-CM

## 2021-12-02 DIAGNOSIS — E78.2 MIXED HYPERLIPIDEMIA: ICD-10-CM

## 2021-12-02 DIAGNOSIS — F41.9 CHRONIC ANXIETY: ICD-10-CM

## 2021-12-02 DIAGNOSIS — I10 ESSENTIAL HYPERTENSION: ICD-10-CM

## 2021-12-03 RX ORDER — ATORVASTATIN CALCIUM 40 MG/1
TABLET, FILM COATED ORAL
Qty: 30 TABLET | Refills: 0 | Status: SHIPPED | OUTPATIENT
Start: 2021-12-03 | End: 2021-12-15 | Stop reason: SDUPTHER

## 2021-12-03 RX ORDER — SERTRALINE HYDROCHLORIDE 100 MG/1
TABLET, FILM COATED ORAL
Qty: 30 TABLET | Refills: 0 | Status: SHIPPED | OUTPATIENT
Start: 2021-12-03 | End: 2021-12-15 | Stop reason: SDUPTHER

## 2021-12-03 RX ORDER — LISINOPRIL AND HYDROCHLOROTHIAZIDE 20; 25 MG/1; MG/1
TABLET ORAL
Qty: 30 TABLET | Refills: 0 | Status: SHIPPED | OUTPATIENT
Start: 2021-12-03 | End: 2021-12-15 | Stop reason: SDUPTHER

## 2021-12-03 RX ORDER — METOPROLOL SUCCINATE 50 MG/1
TABLET, EXTENDED RELEASE ORAL
Qty: 30 TABLET | Refills: 0 | Status: SHIPPED | OUTPATIENT
Start: 2021-12-03 | End: 2021-12-15 | Stop reason: SDUPTHER

## 2021-12-03 RX ORDER — PANTOPRAZOLE SODIUM 40 MG/1
TABLET, DELAYED RELEASE ORAL
Qty: 30 TABLET | Refills: 0 | Status: SHIPPED | OUTPATIENT
Start: 2021-12-03 | End: 2021-12-15 | Stop reason: SDUPTHER

## 2021-12-07 ENCOUNTER — TELEPHONE (OUTPATIENT)
Dept: FAMILY MEDICINE | Facility: CLINIC | Age: 66
End: 2021-12-07
Payer: MEDICARE

## 2021-12-07 DIAGNOSIS — E78.2 MIXED HYPERLIPIDEMIA: Primary | ICD-10-CM

## 2021-12-07 DIAGNOSIS — R73.03 PREDIABETES: ICD-10-CM

## 2021-12-09 ENCOUNTER — LAB VISIT (OUTPATIENT)
Dept: LAB | Facility: HOSPITAL | Age: 66
End: 2021-12-09
Attending: INTERNAL MEDICINE
Payer: MEDICARE

## 2021-12-09 ENCOUNTER — PATIENT MESSAGE (OUTPATIENT)
Dept: FAMILY MEDICINE | Facility: CLINIC | Age: 66
End: 2021-12-09

## 2021-12-09 DIAGNOSIS — R73.03 PREDIABETES: ICD-10-CM

## 2021-12-09 DIAGNOSIS — E78.2 MIXED HYPERLIPIDEMIA: ICD-10-CM

## 2021-12-09 LAB
ALBUMIN SERPL BCP-MCNC: 3.7 G/DL (ref 3.5–5.2)
ALP SERPL-CCNC: 92 U/L (ref 55–135)
ALT SERPL W/O P-5'-P-CCNC: 38 U/L (ref 10–44)
ANION GAP SERPL CALC-SCNC: 14 MMOL/L (ref 8–16)
AST SERPL-CCNC: 26 U/L (ref 10–40)
BILIRUB SERPL-MCNC: 0.4 MG/DL (ref 0.1–1)
BUN SERPL-MCNC: 15 MG/DL (ref 8–23)
CALCIUM SERPL-MCNC: 9 MG/DL (ref 8.7–10.5)
CHLORIDE SERPL-SCNC: 100 MMOL/L (ref 95–110)
CHOLEST SERPL-MCNC: 202 MG/DL (ref 120–199)
CHOLEST/HDLC SERPL: 4 {RATIO} (ref 2–5)
CO2 SERPL-SCNC: 30 MMOL/L (ref 23–29)
CREAT SERPL-MCNC: 1 MG/DL (ref 0.5–1.4)
EST. GFR  (AFRICAN AMERICAN): >60 ML/MIN/1.73 M^2
EST. GFR  (NON AFRICAN AMERICAN): >60 ML/MIN/1.73 M^2
ESTIMATED AVG GLUCOSE: 126 MG/DL (ref 68–131)
GLUCOSE SERPL-MCNC: 107 MG/DL (ref 70–110)
HBA1C MFR BLD: 6 % (ref 4–5.6)
HDLC SERPL-MCNC: 50 MG/DL (ref 40–75)
HDLC SERPL: 24.8 % (ref 20–50)
LDLC SERPL CALC-MCNC: 111 MG/DL (ref 63–159)
NONHDLC SERPL-MCNC: 152 MG/DL
POTASSIUM SERPL-SCNC: 4.2 MMOL/L (ref 3.5–5.1)
PROT SERPL-MCNC: 6.8 G/DL (ref 6–8.4)
SODIUM SERPL-SCNC: 144 MMOL/L (ref 136–145)
TRIGL SERPL-MCNC: 205 MG/DL (ref 30–150)

## 2021-12-09 PROCEDURE — 80061 LIPID PANEL: CPT | Performed by: INTERNAL MEDICINE

## 2021-12-09 PROCEDURE — 36415 COLL VENOUS BLD VENIPUNCTURE: CPT | Mod: PN | Performed by: INTERNAL MEDICINE

## 2021-12-09 PROCEDURE — 80053 COMPREHEN METABOLIC PANEL: CPT | Performed by: INTERNAL MEDICINE

## 2021-12-09 PROCEDURE — 83036 HEMOGLOBIN GLYCOSYLATED A1C: CPT | Performed by: INTERNAL MEDICINE

## 2021-12-15 ENCOUNTER — OFFICE VISIT (OUTPATIENT)
Dept: FAMILY MEDICINE | Facility: CLINIC | Age: 66
End: 2021-12-15
Payer: MEDICARE

## 2021-12-15 DIAGNOSIS — J30.9 ALLERGIC RHINOCONJUNCTIVITIS: ICD-10-CM

## 2021-12-15 DIAGNOSIS — E78.2 MIXED HYPERLIPIDEMIA: ICD-10-CM

## 2021-12-15 DIAGNOSIS — I10 ESSENTIAL HYPERTENSION: Primary | ICD-10-CM

## 2021-12-15 DIAGNOSIS — K21.9 GASTROESOPHAGEAL REFLUX DISEASE: ICD-10-CM

## 2021-12-15 DIAGNOSIS — H10.10 ALLERGIC RHINOCONJUNCTIVITIS: ICD-10-CM

## 2021-12-15 DIAGNOSIS — F41.9 CHRONIC ANXIETY: ICD-10-CM

## 2021-12-15 DIAGNOSIS — R73.03 PREDIABETES: ICD-10-CM

## 2021-12-15 PROCEDURE — 99214 OFFICE O/P EST MOD 30 MIN: CPT | Mod: 95,,, | Performed by: FAMILY MEDICINE

## 2021-12-15 PROCEDURE — 99214 PR OFFICE/OUTPT VISIT, EST, LEVL IV, 30-39 MIN: ICD-10-PCS | Mod: 95,,, | Performed by: FAMILY MEDICINE

## 2021-12-15 RX ORDER — SERTRALINE HYDROCHLORIDE 100 MG/1
100 TABLET, FILM COATED ORAL DAILY
Qty: 90 TABLET | Refills: 3 | Status: SHIPPED | OUTPATIENT
Start: 2021-12-15 | End: 2023-01-04 | Stop reason: SDUPTHER

## 2021-12-15 RX ORDER — LISINOPRIL AND HYDROCHLOROTHIAZIDE 20; 25 MG/1; MG/1
1 TABLET ORAL DAILY
Qty: 90 TABLET | Refills: 3 | Status: SHIPPED | OUTPATIENT
Start: 2021-12-15 | End: 2023-01-04 | Stop reason: SDUPTHER

## 2021-12-15 RX ORDER — PANTOPRAZOLE SODIUM 40 MG/1
40 TABLET, DELAYED RELEASE ORAL DAILY
Qty: 90 TABLET | Refills: 3 | Status: SHIPPED | OUTPATIENT
Start: 2021-12-15 | End: 2023-01-04 | Stop reason: SDUPTHER

## 2021-12-15 RX ORDER — METOPROLOL SUCCINATE 50 MG/1
50 TABLET, EXTENDED RELEASE ORAL NIGHTLY
Qty: 90 TABLET | Refills: 3 | Status: SHIPPED | OUTPATIENT
Start: 2021-12-15 | End: 2023-01-04 | Stop reason: SDUPTHER

## 2021-12-15 RX ORDER — AZELASTINE 1 MG/ML
SPRAY, METERED NASAL
Qty: 90 ML | Refills: 3 | Status: SHIPPED | OUTPATIENT
Start: 2021-12-15 | End: 2023-01-04 | Stop reason: SDUPTHER

## 2021-12-15 RX ORDER — ATORVASTATIN CALCIUM 40 MG/1
40 TABLET, FILM COATED ORAL NIGHTLY
Qty: 90 TABLET | Refills: 3 | Status: SHIPPED | OUTPATIENT
Start: 2021-12-15 | End: 2023-01-04 | Stop reason: SDUPTHER

## 2022-01-01 ENCOUNTER — LAB VISIT (OUTPATIENT)
Dept: PRIMARY CARE CLINIC | Facility: OTHER | Age: 67
End: 2022-01-01
Payer: MEDICARE

## 2022-01-01 ENCOUNTER — PATIENT MESSAGE (OUTPATIENT)
Dept: ADMINISTRATIVE | Facility: OTHER | Age: 67
End: 2022-01-01
Payer: MEDICARE

## 2022-01-01 DIAGNOSIS — R05.9 COUGH: Primary | ICD-10-CM

## 2022-01-01 PROCEDURE — U0003 INFECTIOUS AGENT DETECTION BY NUCLEIC ACID (DNA OR RNA); SEVERE ACUTE RESPIRATORY SYNDROME CORONAVIRUS 2 (SARS-COV-2) (CORONAVIRUS DISEASE [COVID-19]), AMPLIFIED PROBE TECHNIQUE, MAKING USE OF HIGH THROUGHPUT TECHNOLOGIES AS DESCRIBED BY CMS-2020-01-R: HCPCS | Performed by: FAMILY MEDICINE

## 2022-01-04 ENCOUNTER — LAB VISIT (OUTPATIENT)
Dept: PRIMARY CARE CLINIC | Facility: OTHER | Age: 67
End: 2022-01-04
Payer: MEDICARE

## 2022-01-04 DIAGNOSIS — R05.9 COUGH: ICD-10-CM

## 2022-01-04 PROCEDURE — U0003 INFECTIOUS AGENT DETECTION BY NUCLEIC ACID (DNA OR RNA); SEVERE ACUTE RESPIRATORY SYNDROME CORONAVIRUS 2 (SARS-COV-2) (CORONAVIRUS DISEASE [COVID-19]), AMPLIFIED PROBE TECHNIQUE, MAKING USE OF HIGH THROUGHPUT TECHNOLOGIES AS DESCRIBED BY CMS-2020-01-R: HCPCS

## 2022-01-05 LAB
SARS-COV-2 RNA RESP QL NAA+PROBE: NOT DETECTED
SARS-COV-2- CYCLE NUMBER: NORMAL

## 2022-01-08 DIAGNOSIS — U07.1 COVID-19 VIRUS DETECTED: ICD-10-CM

## 2022-01-08 LAB
SARS-COV-2 RNA RESP QL NAA+PROBE: DETECTED
SARS-COV-2- CYCLE NUMBER: 20

## 2022-01-12 ENCOUNTER — LAB VISIT (OUTPATIENT)
Dept: PRIMARY CARE CLINIC | Facility: OTHER | Age: 67
End: 2022-01-12
Payer: MEDICARE

## 2022-01-12 DIAGNOSIS — R68.83 CHILLS: ICD-10-CM

## 2022-01-12 PROCEDURE — U0003 INFECTIOUS AGENT DETECTION BY NUCLEIC ACID (DNA OR RNA); SEVERE ACUTE RESPIRATORY SYNDROME CORONAVIRUS 2 (SARS-COV-2) (CORONAVIRUS DISEASE [COVID-19]), AMPLIFIED PROBE TECHNIQUE, MAKING USE OF HIGH THROUGHPUT TECHNOLOGIES AS DESCRIBED BY CMS-2020-01-R: HCPCS | Performed by: FAMILY MEDICINE

## 2022-01-13 LAB
SARS-COV-2 RNA RESP QL NAA+PROBE: DETECTED
SARS-COV-2- CYCLE NUMBER: 38

## 2022-01-14 ENCOUNTER — LAB VISIT (OUTPATIENT)
Dept: PRIMARY CARE CLINIC | Facility: OTHER | Age: 67
End: 2022-01-14
Payer: MEDICARE

## 2022-01-14 DIAGNOSIS — Z20.822 ENCOUNTER FOR LABORATORY TESTING FOR COVID-19 VIRUS: ICD-10-CM

## 2022-01-14 PROCEDURE — U0003 INFECTIOUS AGENT DETECTION BY NUCLEIC ACID (DNA OR RNA); SEVERE ACUTE RESPIRATORY SYNDROME CORONAVIRUS 2 (SARS-COV-2) (CORONAVIRUS DISEASE [COVID-19]), AMPLIFIED PROBE TECHNIQUE, MAKING USE OF HIGH THROUGHPUT TECHNOLOGIES AS DESCRIBED BY CMS-2020-01-R: HCPCS

## 2022-01-15 ENCOUNTER — LAB VISIT (OUTPATIENT)
Dept: PRIMARY CARE CLINIC | Facility: OTHER | Age: 67
End: 2022-01-15
Payer: MEDICARE

## 2022-01-15 DIAGNOSIS — Z20.822 ENCOUNTER FOR LABORATORY TESTING FOR COVID-19 VIRUS: ICD-10-CM

## 2022-01-15 DIAGNOSIS — R68.83 CHILLS: ICD-10-CM

## 2022-01-15 DIAGNOSIS — M79.10 MUSCLE PAIN: ICD-10-CM

## 2022-01-15 LAB
SARS-COV-2 RNA RESP QL NAA+PROBE: DETECTED
SARS-COV-2- CYCLE NUMBER: 38

## 2022-01-15 PROCEDURE — U0003 INFECTIOUS AGENT DETECTION BY NUCLEIC ACID (DNA OR RNA); SEVERE ACUTE RESPIRATORY SYNDROME CORONAVIRUS 2 (SARS-COV-2) (CORONAVIRUS DISEASE [COVID-19]), AMPLIFIED PROBE TECHNIQUE, MAKING USE OF HIGH THROUGHPUT TECHNOLOGIES AS DESCRIBED BY CMS-2020-01-R: HCPCS

## 2022-01-16 ENCOUNTER — LAB VISIT (OUTPATIENT)
Dept: PRIMARY CARE CLINIC | Facility: OTHER | Age: 67
End: 2022-01-16
Payer: MEDICARE

## 2022-01-16 DIAGNOSIS — Z20.822 ENCOUNTER FOR LABORATORY TESTING FOR COVID-19 VIRUS: ICD-10-CM

## 2022-01-16 DIAGNOSIS — M79.10 MUSCLE PAIN: ICD-10-CM

## 2022-01-16 LAB
SARS-COV-2 RNA RESP QL NAA+PROBE: DETECTED
SARS-COV-2- CYCLE NUMBER: 35

## 2022-01-16 PROCEDURE — U0003 INFECTIOUS AGENT DETECTION BY NUCLEIC ACID (DNA OR RNA); SEVERE ACUTE RESPIRATORY SYNDROME CORONAVIRUS 2 (SARS-COV-2) (CORONAVIRUS DISEASE [COVID-19]), AMPLIFIED PROBE TECHNIQUE, MAKING USE OF HIGH THROUGHPUT TECHNOLOGIES AS DESCRIBED BY CMS-2020-01-R: HCPCS | Performed by: FAMILY MEDICINE

## 2022-01-17 ENCOUNTER — PATIENT MESSAGE (OUTPATIENT)
Dept: FAMILY MEDICINE | Facility: CLINIC | Age: 67
End: 2022-01-17
Payer: MEDICARE

## 2022-01-18 LAB
SARS-COV-2 RNA RESP QL NAA+PROBE: DETECTED
SARS-COV-2- CYCLE NUMBER: 37

## 2022-02-22 ENCOUNTER — PATIENT MESSAGE (OUTPATIENT)
Dept: FAMILY MEDICINE | Facility: CLINIC | Age: 67
End: 2022-02-22
Payer: MEDICARE

## 2022-02-22 DIAGNOSIS — Z12.11 SCREENING FOR MALIGNANT NEOPLASM OF COLON: ICD-10-CM

## 2022-02-22 DIAGNOSIS — Z12.11 SCREENING FOR COLON CANCER: Primary | ICD-10-CM

## 2022-02-23 ENCOUNTER — TELEPHONE (OUTPATIENT)
Dept: GASTROENTEROLOGY | Facility: CLINIC | Age: 67
End: 2022-02-23
Payer: MEDICARE

## 2022-02-23 DIAGNOSIS — Z01.818 PRE-OP TESTING: ICD-10-CM

## 2022-03-28 ENCOUNTER — PES CALL (OUTPATIENT)
Dept: ADMINISTRATIVE | Facility: CLINIC | Age: 67
End: 2022-03-28
Payer: MEDICARE

## 2022-05-09 ENCOUNTER — PATIENT MESSAGE (OUTPATIENT)
Dept: SMOKING CESSATION | Facility: CLINIC | Age: 67
End: 2022-05-09
Payer: MEDICARE

## 2022-05-10 ENCOUNTER — LAB VISIT (OUTPATIENT)
Dept: FAMILY MEDICINE | Facility: CLINIC | Age: 67
End: 2022-05-10
Payer: MEDICARE

## 2022-05-10 DIAGNOSIS — Z01.818 PRE-OP TESTING: ICD-10-CM

## 2022-05-10 PROCEDURE — U0003 INFECTIOUS AGENT DETECTION BY NUCLEIC ACID (DNA OR RNA); SEVERE ACUTE RESPIRATORY SYNDROME CORONAVIRUS 2 (SARS-COV-2) (CORONAVIRUS DISEASE [COVID-19]), AMPLIFIED PROBE TECHNIQUE, MAKING USE OF HIGH THROUGHPUT TECHNOLOGIES AS DESCRIBED BY CMS-2020-01-R: HCPCS | Performed by: INTERNAL MEDICINE

## 2022-05-10 PROCEDURE — U0005 INFEC AGEN DETEC AMPLI PROBE: HCPCS | Performed by: INTERNAL MEDICINE

## 2022-05-11 LAB — SARS-COV-2 RNA RESP QL NAA+PROBE: NOT DETECTED

## 2022-05-12 ENCOUNTER — ANESTHESIA EVENT (OUTPATIENT)
Dept: ENDOSCOPY | Facility: HOSPITAL | Age: 67
End: 2022-05-12
Payer: MEDICARE

## 2022-05-13 ENCOUNTER — ANESTHESIA (OUTPATIENT)
Dept: ENDOSCOPY | Facility: HOSPITAL | Age: 67
End: 2022-05-13
Payer: MEDICARE

## 2022-05-13 ENCOUNTER — HOSPITAL ENCOUNTER (OUTPATIENT)
Facility: HOSPITAL | Age: 67
Discharge: HOME | End: 2022-05-13
Attending: INTERNAL MEDICINE | Admitting: INTERNAL MEDICINE
Payer: MEDICARE

## 2022-05-13 VITALS
DIASTOLIC BLOOD PRESSURE: 80 MMHG | HEIGHT: 73 IN | HEART RATE: 68 BPM | TEMPERATURE: 98 F | SYSTOLIC BLOOD PRESSURE: 136 MMHG | WEIGHT: 265 LBS | RESPIRATION RATE: 16 BRPM | OXYGEN SATURATION: 99 % | BODY MASS INDEX: 35.12 KG/M2

## 2022-05-13 DIAGNOSIS — Z86.010 HISTORY OF COLON POLYPS: Primary | ICD-10-CM

## 2022-05-13 PROCEDURE — D9220A PRA ANESTHESIA: Mod: PT,CRNA,, | Performed by: NURSE ANESTHETIST, CERTIFIED REGISTERED

## 2022-05-13 PROCEDURE — 27201089 HC SNARE, DISP (ANY): Mod: PO | Performed by: INTERNAL MEDICINE

## 2022-05-13 PROCEDURE — 45385 COLONOSCOPY W/LESION REMOVAL: CPT | Mod: PT,,, | Performed by: INTERNAL MEDICINE

## 2022-05-13 PROCEDURE — 63600175 PHARM REV CODE 636 W HCPCS: Mod: PO | Performed by: NURSE ANESTHETIST, CERTIFIED REGISTERED

## 2022-05-13 PROCEDURE — 37000008 HC ANESTHESIA 1ST 15 MINUTES: Mod: PO | Performed by: INTERNAL MEDICINE

## 2022-05-13 PROCEDURE — D9220A PRA ANESTHESIA: Mod: PT,ANES,, | Performed by: ANESTHESIOLOGY

## 2022-05-13 PROCEDURE — 25000003 PHARM REV CODE 250: Mod: PO | Performed by: NURSE ANESTHETIST, CERTIFIED REGISTERED

## 2022-05-13 PROCEDURE — 88305 TISSUE EXAM BY PATHOLOGIST: CPT | Performed by: STUDENT IN AN ORGANIZED HEALTH CARE EDUCATION/TRAINING PROGRAM

## 2022-05-13 PROCEDURE — 45385 PR COLONOSCOPY,REMV LESN,SNARE: ICD-10-PCS | Mod: PT,,, | Performed by: INTERNAL MEDICINE

## 2022-05-13 PROCEDURE — D9220A PRA ANESTHESIA: ICD-10-PCS | Mod: PT,ANES,, | Performed by: ANESTHESIOLOGY

## 2022-05-13 PROCEDURE — 88305 TISSUE EXAM BY PATHOLOGIST: CPT | Mod: 26,,, | Performed by: STUDENT IN AN ORGANIZED HEALTH CARE EDUCATION/TRAINING PROGRAM

## 2022-05-13 PROCEDURE — 37000009 HC ANESTHESIA EA ADD 15 MINS: Mod: PO | Performed by: INTERNAL MEDICINE

## 2022-05-13 PROCEDURE — 27200997: Mod: PO | Performed by: INTERNAL MEDICINE

## 2022-05-13 PROCEDURE — 45385 COLONOSCOPY W/LESION REMOVAL: CPT | Mod: PT,PO | Performed by: INTERNAL MEDICINE

## 2022-05-13 PROCEDURE — D9220A PRA ANESTHESIA: ICD-10-PCS | Mod: PT,CRNA,, | Performed by: NURSE ANESTHETIST, CERTIFIED REGISTERED

## 2022-05-13 PROCEDURE — 88305 TISSUE EXAM BY PATHOLOGIST: ICD-10-PCS | Mod: 26,,, | Performed by: STUDENT IN AN ORGANIZED HEALTH CARE EDUCATION/TRAINING PROGRAM

## 2022-05-13 PROCEDURE — 63600175 PHARM REV CODE 636 W HCPCS: Mod: PO | Performed by: INTERNAL MEDICINE

## 2022-05-13 RX ORDER — SODIUM CHLORIDE 0.9 % (FLUSH) 0.9 %
10 SYRINGE (ML) INJECTION EVERY 6 HOURS PRN
Status: DISCONTINUED | OUTPATIENT
Start: 2022-05-13 | End: 2022-05-13 | Stop reason: HOSPADM

## 2022-05-13 RX ORDER — PROPOFOL 10 MG/ML
VIAL (ML) INTRAVENOUS CONTINUOUS PRN
Status: DISCONTINUED | OUTPATIENT
Start: 2022-05-13 | End: 2022-05-13

## 2022-05-13 RX ORDER — SODIUM CHLORIDE, SODIUM LACTATE, POTASSIUM CHLORIDE, CALCIUM CHLORIDE 600; 310; 30; 20 MG/100ML; MG/100ML; MG/100ML; MG/100ML
INJECTION, SOLUTION INTRAVENOUS CONTINUOUS
Status: DISCONTINUED | OUTPATIENT
Start: 2022-05-13 | End: 2022-05-13 | Stop reason: HOSPADM

## 2022-05-13 RX ORDER — PROPOFOL 10 MG/ML
VIAL (ML) INTRAVENOUS
Status: DISCONTINUED | OUTPATIENT
Start: 2022-05-13 | End: 2022-05-13

## 2022-05-13 RX ORDER — LIDOCAINE HCL/PF 100 MG/5ML
SYRINGE (ML) INTRAVENOUS
Status: DISCONTINUED | OUTPATIENT
Start: 2022-05-13 | End: 2022-05-13

## 2022-05-13 RX ADMIN — SODIUM CHLORIDE, SODIUM LACTATE, POTASSIUM CHLORIDE, AND CALCIUM CHLORIDE: .6; .31; .03; .02 INJECTION, SOLUTION INTRAVENOUS at 08:05

## 2022-05-13 RX ADMIN — LIDOCAINE HYDROCHLORIDE 100 MG: 20 INJECTION, SOLUTION INTRAVENOUS at 09:05

## 2022-05-13 RX ADMIN — PROPOFOL 125 MCG/KG/MIN: 10 INJECTION, EMULSION INTRAVENOUS at 09:05

## 2022-05-13 RX ADMIN — PROPOFOL 150 MG: 10 INJECTION, EMULSION INTRAVENOUS at 09:05

## 2022-05-13 NOTE — ANESTHESIA PREPROCEDURE EVALUATION
05/13/2022  Nilesh Sanchez is a 66 y.o., male.    Pre-op Assessment    I have reviewed the Patient Summary Reports.    I have reviewed the Nursing Notes. I have reviewed the NPO Status.   I have reviewed the Medications.     Review of Systems  Anesthesia Hx:  No problems with previous Anesthesia Denies Hx of Anesthetic complications  History of prior surgery of interest to airway management or planning: Denies Family Hx of Anesthesia complications.   Denies Personal Hx of Anesthesia complications.   Social:  Non-Smoker    Hematology/Oncology:  Hematology Normal   Oncology Normal     EENT/Dental:EENT/Dental Normal   Cardiovascular:   Exercise tolerance: good Hypertension, well controlled Denies MI.  Denies CAD.    Denies CABG/stent.   Denies Angina. Old EKG Inf infarct,angio WNL   Pulmonary:  Pulmonary Normal  Denies COPD.  Denies Asthma.  Denies Recent URI.    Renal/:  Renal/ Normal  Denies Chronic Renal Disease.     Hepatic/GI:   GERD, well controlled Denies Liver Disease.    Musculoskeletal:  Musculoskeletal Normal    Neurological:   Denies TIA. Denies CVA. Denies Seizures.    Endocrine:  Endocrine Normal Denies Diabetes. Denies Hypothyroidism.    Dermatological:  Skin Normal    Psych:  Psychiatric Normal  Denies Psychiatric History.          Physical Exam  General:  Obesity      Airway/Jaw/Neck:  Airway Findings: Mouth Opening: Normal   Tongue: Normal   Oropharynx Findings: Normal Mallampati: II Improves to II with phonation.  TM Distance: 4 - 6 cm   Neck Findings: Normal     Dental:  Dental Findings: molar caps, In tact     Chest/Lungs:  Chest/Lungs Findings: Clear to auscultation, Normal Respiratory Rate      Heart/Vascular:  Heart Findings: Rate: Normal  Rhythm: Regular Rhythm  Sounds: Normal  Heart murmur: negative    Abdomen:  Abdomen Findings: Normal    Musculoskeletal:  Musculoskeletal  Findings: Normal   Skin:  Skin Findings: Normal    Mental Status:  Mental Status Findings:  Cooperative, Alert and Oriented         Anesthesia Plan  Type of Anesthesia, risks & benefits discussed:  Anesthesia Type:  general    Patient's Preference:   Plan Factors:          Intra-op Monitoring Plan: Standard ASA Monitors  Intra-op Monitoring Plan Comments:   Post Op Pain Control Plan: multimodal analgesia  Post Op Pain Control Plan Comments:     Induction:   IV  Beta Blocker:         Informed Consent: Informed consent signed with the Patient and all parties understand the risks and agree with anesthesia plan.  All questions answered.  Anesthesia consent signed with patient.  ASA Score: 3     Day of Surgery Review of History & Physical:        Anesthesia Plan Notes: Labs in epic        Ready For Surgery From Anesthesia Perspective.           Physical Exam  General: Obesity    Airway:  Mallampati: II / II  Mouth Opening: Normal  TM Distance: 4 - 6 cm  Tongue: Normal    Dental:  molar caps, In tact    Chest/Lungs:  Clear to auscultation, Normal Respiratory Rate    Heart:  Rate: Normal  Rhythm: Regular Rhythm  Sounds: Normal          Anesthesia Plan  Type of Anesthesia, risks & benefits discussed:    Anesthesia Type: general  Intra-op Monitoring Plan: Standard ASA Monitors  Post Op Pain Control Plan: multimodal analgesia  Induction:  IV  Informed Consent: Informed consent signed with the Patient and all parties understand the risks and agree with anesthesia plan.  All questions answered.   ASA Score: 3  Anesthesia Plan Notes: Labs in epic    Ready For Surgery From Anesthesia Perspective.       .

## 2022-05-13 NOTE — PROVATION PATIENT INSTRUCTIONS
Discharge Summary/Instructions after an Endoscopic Procedure  Patient Name: Nilesh Sanchez  Patient MRN: [id number]  Patient YOB: 1955  Friday, May 13, 2022  Polo Diop MD  Dear patient,  As a result of recent federal legislation (The Federal Cures Act), you may   receive lab or pathology results from your procedure in your [facility]   account before your physician is able to contact you. Your physician or   their representative will relay the results to you with their   recommendations at their soonest availability.  Thank you,  RESTRICTIONS:  During your procedure today, you received medications for sedation.  These   medications may affect your judgment, balance and coordination.  Therefore,   for 24 hours, you have the following restrictions:   - DO NOT drive a car, operate machinery, make legal/financial decisions,   sign important papers or drink alcohol.    ACTIVITY:  Today: no heavy lifting, straining or running due to procedural   sedation/anesthesia.  The following day: return to full activity including work.  DIET:  Eat and drink normally unless instructed otherwise.     TREATMENT FOR COMMON SIDE EFFECTS:  - Mild abdominal pain, nausea, belching, bloating or excessive gas:  rest,   eat lightly and use a heating pad.  - Sore Throat: treat with throat lozenges and/or gargle with warm salt   water.  - Because air was used during the procedure, expelling large amounts of air   from your rectum or belching is normal.  - If a bowel prep was taken, you may not have a bowel movement for 1-3 days.    This is normal.  SYMPTOMS TO WATCH FOR AND REPORT TO YOUR PHYSICIAN:  1. Abdominal pain or bloating, other than gas cramps.  2. Chest pain.  3. Back pain.  4. Signs of infection such as: chills or fever occurring within 24 hours   after the procedure.  5. Rectal bleeding, which would show as bright red, maroon, or black stools.   (A tablespoon of blood from the rectum is not serious, especially if    hemorrhoids are present.)  6. Vomiting.  7. Weakness or dizziness.  GO DIRECTLY TO THE NEAREST EMERGENCY ROOM IF YOU HAVE ANY OF THE FOLLOWING:      Difficulty breathing              Chills and/or fever over 101 F   Persistent vomiting and/or vomiting blood   Severe abdominal pain   Severe chest pain   Black, tarry stools   Bleeding- more than one tablespoon   Any other symptom or condition that you feel may need urgent attention  Your doctor recommends these additional instructions:  If any biopsies were taken, your doctors clinic will contact you in 1 to 2   weeks with any results.  Your physician has recommended a repeat colonoscopy in three years for   surveillance.   You are being discharged to home.   Advance your diet as tolerated.   Continue your present medications.   We are waiting for your pathology results.  For questions, problems or results please call your physician - Polo Diop MD at Work:  [phone].  EMERGENCY PHONE NUMBER: [phone], LAB RESULTS: [phone]  IF A COMPLICATION OR EMERGENCY SITUATION ARISES AND YOU ARE UNABLE TO REACH   YOUR PHYSICIAN - GO DIRECTLY TO THE EMERGENCY ROOM.  ___________________________________________  Nurse Signature  ___________________________________________  Patient/Designated Responsible Party Signature  Polo Diop MD  5/13/2022 9:42:11 AM  This report has been verified and signed electronically.  Dear patient,  As a result of recent federal legislation (The Federal Cures Act), you may   receive lab or pathology results from your procedure in your [facility]   account before your physician is able to contact you. Your physician or   their representative will relay the results to you with their   recommendations at their soonest availability.  Thank you.  PROVATION

## 2022-05-13 NOTE — H&P
History & Physical - Short Stay  Gastroenterology      SUBJECTIVE:     Procedure: Colonoscopy    Chief Complaint/Indication for Procedure: Previous Polyps    PTA Medications   Medication Sig    atorvastatin (LIPITOR) 40 MG tablet Take 1 tablet (40 mg total) by mouth every evening.    lisinopriL-hydrochlorothiazide (PRINZIDE,ZESTORETIC) 20-25 mg Tab Take 1 tablet by mouth once daily.    metoprolol succinate (TOPROL-XL) 50 MG 24 hr tablet Take 1 tablet (50 mg total) by mouth every evening.    pantoprazole (PROTONIX) 40 MG tablet Take 1 tablet (40 mg total) by mouth once daily.    sertraline (ZOLOFT) 100 MG tablet Take 1 tablet (100 mg total) by mouth once daily.    azelastine (ASTELIN) 137 mcg (0.1 %) nasal spray INSTILL 1 SPRAY (137 MCG TOTAL) BY NASAL ROUTE 2 (TWO) TIMES DAILY.    sildenafiL (VIAGRA) 100 MG tablet Take 1 tablet (100 mg total) by mouth daily as needed for Erectile Dysfunction.       Review of patient's allergies indicates:  No Known Allergies     Past Medical History:   Diagnosis Date    Allergic rhinosinusitis     Anxiety     Back pain     with spasms    Diverticulitis     s/p partial colectomy    GERD (gastroesophageal reflux disease)     Hx of colonic polyps     Hyperlipidemia     Hypertension      Past Surgical History:   Procedure Laterality Date    ARTHROSCOPIC CHONDROPLASTY OF KNEE JOINT Left 8/22/2018    Procedure: ARTHROSCOPY, KNEE, WITH CHONDROPLASTY;  Surgeon: Buster Bansal MD;  Location: [facility];  Service: Orthopedics;  Laterality: Left;    COLECTOMY      18in of sigmoid colon removed, about 2014, [facility]    COLONOSCOPY      ESOPHAGOGASTRODUODENOSCOPY      KNEE ARTHROSCOPY W/ MENISCECTOMY Left 8/22/2018    Procedure: ARTHROSCOPY, KNEE, WITH MENISCECTOMY PARTIAL MEDIAL;  Surgeon: Buster Bansal MD;  Location: [facility];  Service: Orthopedics;  Laterality: Left;  femoral     LUMBAR DISC SURGERY  12/07/2017    L4/5    UPPER GASTROINTESTINAL ENDOSCOPY        Family History   Problem Relation Age of Onset    Heart disease Mother     Heart disease Father     Heart disease Sister     Atrial fibrillation Sister     Heart disease Brother      Social History     Tobacco Use    Smoking status: Never Smoker    Smokeless tobacco: Never Used   Substance Use Topics    Alcohol use: Yes     Alcohol/week: 16.0 - 18.0 standard drinks     Types: 2 Glasses of wine, 14 - 16 Standard drinks or equivalent per week     Comment: wine daily    Drug use: No     OBJECTIVE:     Physical Exam:                                                       GENERAL:  Comfortable, in no acute distress.                                 HEENT EXAM:  Nonicteric.  No adenopathy.  Oropharynx is clear.               NECK:  Supple.                                                               LUNGS:  Clear.                                                               CARDIAC:  Regular rate and rhythm.  S1, S2.  No murmur.                      ABDOMEN:  Soft, positive bowel sounds, nontender.  No hepatosplenomegaly or masses.  No rebound or guarding.                                             EXTREMITIES:  No edema.     MENTAL STATUS:  Normal, alert and oriented.    ASSESSMENT/PLAN:     Assessment: Previous Polyps    Plan: Colonoscopy

## 2022-05-13 NOTE — TRANSFER OF CARE
"Anesthesia Transfer of Care Note    Patient: Nilesh Sanchez    Procedure(s) Performed: Procedure(s) (LRB):  COLONOSCOPY (N/A)    Patient location: PACU    Anesthesia Type: general    Transport from OR: Transported from OR on room air with adequate spontaneous ventilation    Post pain: adequate analgesia    Post assessment: no apparent anesthetic complications and tolerated procedure well    Post vital signs: stable    Level of consciousness: awake and sedated    Nausea/Vomiting: no nausea/vomiting    Complications: none    Transfer of care protocol was followed      Last vitals:   Visit Vitals  BP (!) 184/87 (BP Location: Right arm, Patient Position: Lying)   Pulse (!) 58   Temp 36.9 °C (98.4 °F) (Skin)   Resp 16   Ht 6' 1" (1.854 m)   Wt 120.2 kg (265 lb)   SpO2 95%   BMI 34.96 kg/m²     "

## 2022-05-13 NOTE — ANESTHESIA POSTPROCEDURE EVALUATION
Anesthesia Post Evaluation    Patient: Nilesh Sanchez    Procedure(s) Performed: Procedure(s) (LRB):  COLONOSCOPY (N/A)    Final Anesthesia Type: general      Patient location during evaluation: PACU  Patient participation: Yes- Able to Participate  Level of consciousness: awake and alert and oriented  Post-procedure vital signs: reviewed and stable  Pain management: adequate  Airway patency: patent    PONV status at discharge: No PONV  Anesthetic complications: no      Cardiovascular status: blood pressure returned to baseline  Respiratory status: unassisted, spontaneous ventilation and room air  Hydration status: euvolemic  Follow-up not needed.          Vitals Value Taken Time   /80 05/13/22 0950   Temp 36.8 °C (98.2 °F) 05/13/22 0940   Pulse 68 05/13/22 0950   Resp 16 05/13/22 0950   SpO2 99 % 05/13/22 0950         Event Time   Out of Recovery 10:05:00         Pain/Justina Score: Justina Score: 10 (5/13/2022 10:05 AM)

## 2022-05-23 ENCOUNTER — PATIENT MESSAGE (OUTPATIENT)
Dept: GASTROENTEROLOGY | Facility: CLINIC | Age: 67
End: 2022-05-23
Payer: MEDICARE

## 2022-05-23 LAB
FINAL PATHOLOGIC DIAGNOSIS: NORMAL
Lab: NORMAL

## 2022-06-29 ENCOUNTER — OFFICE VISIT (OUTPATIENT)
Dept: DERMATOLOGY | Facility: CLINIC | Age: 67
End: 2022-06-29
Payer: MEDICARE

## 2022-06-29 DIAGNOSIS — D18.01 CHERRY ANGIOMA: ICD-10-CM

## 2022-06-29 DIAGNOSIS — L82.1 SEBORRHEIC KERATOSES: Primary | ICD-10-CM

## 2022-06-29 DIAGNOSIS — D22.9 BENIGN NEVUS: ICD-10-CM

## 2022-06-29 DIAGNOSIS — L81.4 LENTIGO: ICD-10-CM

## 2022-06-29 DIAGNOSIS — L57.0 ACTINIC KERATOSIS: ICD-10-CM

## 2022-06-29 PROCEDURE — 17000 DESTRUCT PREMALG LESION: CPT | Mod: S$PBB,,, | Performed by: STUDENT IN AN ORGANIZED HEALTH CARE EDUCATION/TRAINING PROGRAM

## 2022-06-29 PROCEDURE — 99999 PR PBB SHADOW E&M-EST. PATIENT-LVL II: ICD-10-PCS | Mod: PBBFAC,,, | Performed by: STUDENT IN AN ORGANIZED HEALTH CARE EDUCATION/TRAINING PROGRAM

## 2022-06-29 PROCEDURE — 99203 OFFICE O/P NEW LOW 30 MIN: CPT | Mod: 25,S$PBB,, | Performed by: STUDENT IN AN ORGANIZED HEALTH CARE EDUCATION/TRAINING PROGRAM

## 2022-06-29 PROCEDURE — 17000 DESTRUCT PREMALG LESION: CPT | Mod: PBBFAC,PO | Performed by: STUDENT IN AN ORGANIZED HEALTH CARE EDUCATION/TRAINING PROGRAM

## 2022-06-29 PROCEDURE — 99212 OFFICE O/P EST SF 10 MIN: CPT | Mod: PBBFAC,PO | Performed by: STUDENT IN AN ORGANIZED HEALTH CARE EDUCATION/TRAINING PROGRAM

## 2022-06-29 PROCEDURE — 99203 PR OFFICE/OUTPT VISIT, NEW, LEVL III, 30-44 MIN: ICD-10-PCS | Mod: 25,S$PBB,, | Performed by: STUDENT IN AN ORGANIZED HEALTH CARE EDUCATION/TRAINING PROGRAM

## 2022-06-29 PROCEDURE — 99999 PR PBB SHADOW E&M-EST. PATIENT-LVL II: CPT | Mod: PBBFAC,,, | Performed by: STUDENT IN AN ORGANIZED HEALTH CARE EDUCATION/TRAINING PROGRAM

## 2022-06-29 PROCEDURE — 17000 PR DESTRUCTION(LASER SURGERY,CRYOSURGERY,CHEMOSURGERY),PREMALIGNANT LESIONS,FIRST LESION: ICD-10-PCS | Mod: S$PBB,,, | Performed by: STUDENT IN AN ORGANIZED HEALTH CARE EDUCATION/TRAINING PROGRAM

## 2022-06-29 NOTE — PROGRESS NOTES
Subjective:       Patient ID:  Nilesh Sanchez is a 67 y.o. male who presents for   Chief Complaint   Patient presents with    Spot     Under left eye     New patient    Patient here today for skin check UBSE  Patient states he has a spot on left lower eye x months.     Denies Phx of NMSC  Denies Fhx of MM      Review of Systems   Constitutional: Negative for fever, chills and fatigue.   Respiratory: Negative for cough and shortness of breath.    Gastrointestinal: Negative for nausea and vomiting.   Skin: Positive for activity-related sunscreen use and wears hat. Negative for daily sunscreen use.   Hematologic/Lymphatic: Does not bruise/bleed easily.        Objective:    Physical Exam   Constitutional: He appears well-developed and well-nourished. No distress.   Neurological: He is alert and oriented to person, place, and time. He is not disoriented.   Psychiatric: He has a normal mood and affect.   Skin:   Areas Examined (abnormalities noted in diagram):   Scalp / Hair Palpated and Inspected  Head / Face Inspection Performed  Neck Inspection Performed  Chest / Axilla Inspection Performed  Abdomen Inspection Performed  Back Inspection Performed  RUE Inspected  LUE Inspection Performed  Nails and Digits Inspection Performed                       Diagram Legend     Erythematous scaling macule/papule c/w actinic keratosis       Vascular papule c/w angioma      Pigmented verrucoid papule/plaque c/w seborrheic keratosis      Yellow umbilicated papule c/w sebaceous hyperplasia      Irregularly shaped tan macule c/w lentigo     1-2 mm smooth white papules consistent with Milia      Movable subcutaneous cyst with punctum c/w epidermal inclusion cyst      Subcutaneous movable cyst c/w pilar cyst      Firm pink to brown papule c/w dermatofibroma      Pedunculated fleshy papule(s) c/w skin tag(s)      Evenly pigmented macule c/w junctional nevus     Mildly variegated pigmented, slightly irregular-bordered macule c/w mildly  atypical nevus      Flesh colored to evenly pigmented papule c/w intradermal nevus       Pink pearly papule/plaque c/w basal cell carcinoma      Erythematous hyperkeratotic cursted plaque c/w SCC      Surgical scar with no sign of skin cancer recurrence      Open and closed comedones      Inflammatory papules and pustules      Verrucoid papule consistent consistent with wart     Erythematous eczematous patches and plaques     Dystrophic onycholytic nail with subungual debris c/w onychomycosis     Umbilicated papule    Erythematous-base heme-crusted tan verrucoid plaque consistent with inflamed seborrheic keratosis     Erythematous Silvery Scaling Plaque c/w Psoriasis     See annotation      Assessment / Plan:        Seborrheic keratoses  These are benign inherited growths without a malignant potential. Reassurance given to patient. No treatment is necessary.   Discussed risks and benefits of LN2 for SK on left lower eyelid    Lentigo  This is a benign hyperpigmented sun induced lesion. Daily sun protection will reduce the number of new lesions. Treatment of these benign lesions are considered cosmetic.    Benign nevus  Careful dermoscopy evaluation of nevi performed with none identified as needing biopsy today  Monitor for new mole or moles that are becoming bigger, darker, irritated, or developing irregular borders.   Upper body skin examination performed today including at least 6 points as noted in physical examination. No lesions suspicious for malignancy noted.  Patient instructed in importance in daily broad spectrum sun protection of at least spf 30. Mineral sunscreen ingredients preferred (Zinc +/- Titanium) and can be found OTC.   Patient encouraged to wear hat for all outdoor exposure.   Also discussed sun avoidance and use of protective clothing.    Ortega angioma  This is a benign vascular lesion. Reassurance given. No treatment required.     Actinic keratosis, right ear  Cryosurgery Procedure  Note    Verbal consent from the patient is obtained and the patient is aware of the precancerous quality and need for treatment of these lesions. Liquid nitrogen cryosurgery is applied to the 1 actinic keratoses, as detailed in the physical exam, to produce a freeze injury. The patient is aware that blisters may form and is instructed on wound care with gentle cleansing and use of vaseline ointment to keep moist until healed. The patient is supplied a handout on cryosurgery and is instructed to call if lesions do not completely resolve.           1 year   No follow-ups on file.

## 2022-06-29 NOTE — PATIENT INSTRUCTIONS

## 2022-08-04 ENCOUNTER — PATIENT MESSAGE (OUTPATIENT)
Dept: FAMILY MEDICINE | Facility: CLINIC | Age: 67
End: 2022-08-04
Payer: MEDICARE

## 2022-08-05 ENCOUNTER — OFFICE VISIT (OUTPATIENT)
Dept: FAMILY MEDICINE | Facility: CLINIC | Age: 67
End: 2022-08-05
Payer: MEDICARE

## 2022-08-05 VITALS
HEART RATE: 77 BPM | HEIGHT: 73 IN | DIASTOLIC BLOOD PRESSURE: 68 MMHG | OXYGEN SATURATION: 96 % | BODY MASS INDEX: 35.41 KG/M2 | SYSTOLIC BLOOD PRESSURE: 126 MMHG | WEIGHT: 267.19 LBS

## 2022-08-05 DIAGNOSIS — L02.212 ABSCESS OF BACK: Primary | ICD-10-CM

## 2022-08-05 DIAGNOSIS — E66.01 SEVERE OBESITY (BMI 35.0-39.9) WITH COMORBIDITY: ICD-10-CM

## 2022-08-05 PROCEDURE — 99999 PR PBB SHADOW E&M-EST. PATIENT-LVL IV: CPT | Mod: PBBFAC,,, | Performed by: PHYSICIAN ASSISTANT

## 2022-08-05 PROCEDURE — 99214 OFFICE O/P EST MOD 30 MIN: CPT | Mod: PBBFAC,PO | Performed by: PHYSICIAN ASSISTANT

## 2022-08-05 PROCEDURE — 99213 OFFICE O/P EST LOW 20 MIN: CPT | Mod: S$PBB,,, | Performed by: PHYSICIAN ASSISTANT

## 2022-08-05 PROCEDURE — 99999 PR PBB SHADOW E&M-EST. PATIENT-LVL IV: ICD-10-PCS | Mod: PBBFAC,,, | Performed by: PHYSICIAN ASSISTANT

## 2022-08-05 PROCEDURE — 99213 PR OFFICE/OUTPT VISIT, EST, LEVL III, 20-29 MIN: ICD-10-PCS | Mod: S$PBB,,, | Performed by: PHYSICIAN ASSISTANT

## 2022-08-05 RX ORDER — SULFAMETHOXAZOLE AND TRIMETHOPRIM 800; 160 MG/1; MG/1
1 TABLET ORAL 2 TIMES DAILY
Qty: 20 TABLET | Refills: 0 | Status: SHIPPED | OUTPATIENT
Start: 2022-08-05 | End: 2022-08-15

## 2022-08-05 NOTE — PATIENT INSTRUCTIONS
Take Bactrim twice a day with food.  Take culturelle while on on it.    Thanks for seeing me,  Katelyn Hurley PA-C

## 2022-08-05 NOTE — PROGRESS NOTES
"Subjective:      Patient ID: Nilesh Sanchez is a 67 y.o. male.    Chief Complaint: Recurrent Skin Infections (On back; flare up for 2 weeks; between shoulder blades)    Patient is new to me.    HPI   Patient has PMH of HTN, hypertriglyceridemia, prediabetes, and GERD.    Patient has had an abscess on back that is flared for 2 weeks.  It is draining green pus.  Using alcohol, peroxide, and neoporin without resolution of symptoms.  Patient denies fever.  He has had many abscesses in the past.    Review of Systems   Constitutional: Negative for appetite change, chills and fever.   Respiratory: Negative for shortness of breath.    Cardiovascular: Negative for chest pain.   Skin: Positive for wound.       Objective:   /68   Pulse 77   Ht 6' 1" (1.854 m)   Wt 121.2 kg (267 lb 3.2 oz)   SpO2 96%   BMI 35.25 kg/m²      Physical Exam  Vitals reviewed.   Constitutional:       General: He is not in acute distress.     Appearance: Normal appearance. He is well-developed.   HENT:      Head: Normocephalic and atraumatic.      Right Ear: External ear normal.      Left Ear: External ear normal.   Eyes:      Conjunctiva/sclera: Conjunctivae normal.   Cardiovascular:      Rate and Rhythm: Normal rate and regular rhythm.      Heart sounds: Normal heart sounds. No murmur heard.    No friction rub. No gallop.   Pulmonary:      Effort: Pulmonary effort is normal. No respiratory distress.      Breath sounds: Normal breath sounds. No wheezing or rales.   Musculoskeletal:         General: Normal range of motion.      Cervical back: Normal range of motion.   Skin:     General: Skin is warm and dry.      Findings: Abscess present. No rash.          Neurological:      General: No focal deficit present.      Mental Status: He is alert and oriented to person, place, and time.   Psychiatric:         Mood and Affect: Mood normal.         Behavior: Behavior normal.         Judgment: Judgment normal.        Assessment:      1. Abscess of " back       Plan:   1. Abscess of back  Discussed signs of infection to be aware of.  Take antibiotic with food.  Take probiotic while on medication.  - sulfamethoxazole-trimethoprim 800-160mg (BACTRIM DS) 800-160 mg Tab; Take 1 tablet by mouth 2 (two) times daily. for 10 days  Dispense: 20 tablet; Refill: 0  - Ambulatory referral/consult to General Surgery; Future    Follow up as needed.  Patient agreed with plan and expressed understanding.    Thank you for allowing me to serve you,

## 2022-08-16 ENCOUNTER — OFFICE VISIT (OUTPATIENT)
Dept: SURGERY | Facility: CLINIC | Age: 67
End: 2022-08-16
Payer: MEDICARE

## 2022-08-16 VITALS
WEIGHT: 266.31 LBS | HEART RATE: 56 BPM | SYSTOLIC BLOOD PRESSURE: 162 MMHG | DIASTOLIC BLOOD PRESSURE: 79 MMHG | TEMPERATURE: 98 F | HEIGHT: 73 IN | BODY MASS INDEX: 35.3 KG/M2

## 2022-08-16 DIAGNOSIS — L02.212 ABSCESS OF BACK: ICD-10-CM

## 2022-08-16 PROCEDURE — 99999 PR PBB SHADOW E&M-EST. PATIENT-LVL III: CPT | Mod: PBBFAC,,, | Performed by: STUDENT IN AN ORGANIZED HEALTH CARE EDUCATION/TRAINING PROGRAM

## 2022-08-16 PROCEDURE — 99499 NO LOS: ICD-10-PCS | Mod: S$PBB,,, | Performed by: STUDENT IN AN ORGANIZED HEALTH CARE EDUCATION/TRAINING PROGRAM

## 2022-08-16 PROCEDURE — 99499 UNLISTED E&M SERVICE: CPT | Mod: S$PBB,,, | Performed by: STUDENT IN AN ORGANIZED HEALTH CARE EDUCATION/TRAINING PROGRAM

## 2022-08-16 PROCEDURE — 10060 PR DRAIN SKIN ABSCESS SIMPLE: ICD-10-PCS | Mod: S$PBB,,, | Performed by: STUDENT IN AN ORGANIZED HEALTH CARE EDUCATION/TRAINING PROGRAM

## 2022-08-16 PROCEDURE — 99999 PR PBB SHADOW E&M-EST. PATIENT-LVL III: ICD-10-PCS | Mod: PBBFAC,,, | Performed by: STUDENT IN AN ORGANIZED HEALTH CARE EDUCATION/TRAINING PROGRAM

## 2022-08-16 PROCEDURE — 10060 I&D ABSCESS SIMPLE/SINGLE: CPT | Mod: S$PBB,,, | Performed by: STUDENT IN AN ORGANIZED HEALTH CARE EDUCATION/TRAINING PROGRAM

## 2022-08-16 PROCEDURE — 99213 OFFICE O/P EST LOW 20 MIN: CPT | Mod: PBBFAC,PO | Performed by: STUDENT IN AN ORGANIZED HEALTH CARE EDUCATION/TRAINING PROGRAM

## 2022-08-16 PROCEDURE — 10060 I&D ABSCESS SIMPLE/SINGLE: CPT | Mod: PBBFAC,PO | Performed by: STUDENT IN AN ORGANIZED HEALTH CARE EDUCATION/TRAINING PROGRAM

## 2022-08-16 NOTE — PROGRESS NOTES
Procedure note     This is a 67-year-old male who presented with an infected sebaceous cyst.  He has been on antibiotics but it has not resolved.  He did consent to I&D.  He was brought to the procedure room.  Placed prone.  The area over the back abscess was prepped and draped in the usual fashion and a time-out was completed.  Skin was anesthetized with 1% lidocaine with epinephrine.  A 3 cm cruciate incision was made over a fluctuant mass.  There was necrotic debris and pus that was expressed from the wound.  This appeared to be an infected sebaceous cyst.  The wound was then irrigated and loosely packed with a gauze 4 x 4.  Wound care instructions were provided.  Patient will follow-up with me as needed.  Patient tolerated the entire procedure without apparent complication.

## 2022-08-23 ENCOUNTER — TELEPHONE (OUTPATIENT)
Dept: VASCULAR SURGERY | Facility: CLINIC | Age: 67
End: 2022-08-23
Payer: MEDICARE

## 2022-08-23 DIAGNOSIS — I87.2 VENOUS INSUFFICIENCY: Primary | ICD-10-CM

## 2022-08-24 ENCOUNTER — OFFICE VISIT (OUTPATIENT)
Dept: VASCULAR SURGERY | Facility: CLINIC | Age: 67
End: 2022-08-24
Payer: MEDICARE

## 2022-08-24 ENCOUNTER — HOSPITAL ENCOUNTER (OUTPATIENT)
Dept: RADIOLOGY | Facility: OTHER | Age: 67
Discharge: HOME | End: 2022-08-24
Attending: SURGERY
Payer: MEDICARE

## 2022-08-24 VITALS — TEMPERATURE: 97 F | SYSTOLIC BLOOD PRESSURE: 181 MMHG | HEART RATE: 64 BPM | DIASTOLIC BLOOD PRESSURE: 87 MMHG

## 2022-08-24 DIAGNOSIS — I83.90 SPIDER VEIN OF LOWER EXTREMITY: Primary | ICD-10-CM

## 2022-08-24 DIAGNOSIS — I87.2 VENOUS INSUFFICIENCY: ICD-10-CM

## 2022-08-24 DIAGNOSIS — I78.1 SPIDER VEIN, SYMPTOMATIC: ICD-10-CM

## 2022-08-24 PROCEDURE — 93970 EXTREMITY STUDY: CPT | Mod: 26,,, | Performed by: RADIOLOGY

## 2022-08-24 PROCEDURE — 99202 PR OFFICE/OUTPT VISIT, NEW, LEVL II, 15-29 MIN: ICD-10-PCS | Mod: S$GLB,,, | Performed by: SURGERY

## 2022-08-24 PROCEDURE — 93970 EXTREMITY STUDY: CPT | Mod: TC

## 2022-08-24 PROCEDURE — 93970 US LOWER EXTREMITY VEINS BILATERAL INSUFFICIENCY: ICD-10-PCS | Mod: 26,,, | Performed by: RADIOLOGY

## 2022-08-24 PROCEDURE — 99202 OFFICE O/P NEW SF 15 MIN: CPT | Mod: S$GLB,,, | Performed by: SURGERY

## 2022-08-24 NOTE — PROGRESS NOTES
VASCULAR SURGERY CLINIC NOTE    Patient ID: Nilesh Sanchez is a 67 y.o. male.    I. HISTORY     Chief Complaint: spider veins, leg cramps    HPI: Nilesh Sanchez is a 67 y.o. male who is here today for evaluation of leg veins.  Symptoms include tenderness, pain. He also complains of severe leg/calf cramps at night. The calf cramps began couple years ago. Noticed enlarging clusters of spider veins on left thigh. Location is left worse than right. Patient is not wearing compression stockings daily. Patient has no history of DVT. History of venous interventions includes none.  No known family history of venous disease.  Symptoms do not currently limit patient's functional status and daily activities.     Migraine with aura: No  PFO/ASD/right to left shunt:  no    MI: no  Stroke: No  Seizure Disorder: No      Past Medical History:   Diagnosis Date    Allergic rhinosinusitis     Anxiety     Back pain     with spasms    Diverticulitis     s/p partial colectomy    GERD (gastroesophageal reflux disease)     Hx of colonic polyps     Hyperlipidemia     Hypertension         Past Surgical History:   Procedure Laterality Date    ARTHROSCOPIC CHONDROPLASTY OF KNEE JOINT Left 8/22/2018    Procedure: ARTHROSCOPY, KNEE, WITH CHONDROPLASTY;  Surgeon: Buster Bansal MD;  Location: [facility];  Service: Orthopedics;  Laterality: Left;    COLECTOMY      18in of sigmoid colon removed, about 2014, [facility]    COLONOSCOPY      COLONOSCOPY N/A 5/13/2022    Procedure: COLONOSCOPY;  Surgeon: Polo Diop MD;  Location: [facility];  Service: Endoscopy;  Laterality: N/A;    ESOPHAGOGASTRODUODENOSCOPY      KNEE ARTHROSCOPY W/ MENISCECTOMY Left 8/22/2018    Procedure: ARTHROSCOPY, KNEE, WITH MENISCECTOMY PARTIAL MEDIAL;  Surgeon: Buster Bansal MD;  Location: [facility];  Service: Orthopedics;  Laterality: Left;  femoral     LUMBAR DISC SURGERY  12/07/2017    L4/5    UPPER GASTROINTESTINAL ENDOSCOPY         Social History     Occupational  History    Not on file   Tobacco Use    Smoking status: Never    Smokeless tobacco: Never   Substance and Sexual Activity    Alcohol use: Yes     Alcohol/week: 16.0 - 18.0 standard drinks     Types: 2 Glasses of wine, 14 - 16 Standard drinks or equivalent per week     Comment: wine daily    Drug use: No    Sexual activity: Yes     Partners: Female         Current Outpatient Medications:     atorvastatin (LIPITOR) 40 MG tablet, Take 1 tablet (40 mg total) by mouth every evening., Disp: 90 tablet, Rfl: 3    azelastine (ASTELIN) 137 mcg (0.1 %) nasal spray, INSTILL 1 SPRAY (137 MCG TOTAL) BY NASAL ROUTE 2 (TWO) TIMES DAILY., Disp: 90 mL, Rfl: 3    lisinopriL-hydrochlorothiazide (PRINZIDE,ZESTORETIC) 20-25 mg Tab, Take 1 tablet by mouth once daily., Disp: 90 tablet, Rfl: 3    metoprolol succinate (TOPROL-XL) 50 MG 24 hr tablet, Take 1 tablet (50 mg total) by mouth every evening., Disp: 90 tablet, Rfl: 3    pantoprazole (PROTONIX) 40 MG tablet, Take 1 tablet (40 mg total) by mouth once daily., Disp: 90 tablet, Rfl: 3    sertraline (ZOLOFT) 100 MG tablet, Take 1 tablet (100 mg total) by mouth once daily., Disp: 90 tablet, Rfl: 3    sildenafiL (VIAGRA) 100 MG tablet, Take 1 tablet (100 mg total) by mouth daily as needed for Erectile Dysfunction. (Patient not taking: No sig reported), Disp: 10 tablet, Rfl: 11    Review of Systems   Constitutional: Negative for weight loss.   HENT:  Negative for ear pain and nosebleeds.    Eyes:  Negative for discharge and pain.   Cardiovascular:  Negative for chest pain and palpitations.   Respiratory:  Negative for cough, shortness of breath and wheezing.    Endocrine: Negative for cold intolerance, heat intolerance and polyphagia.   Hematologic/Lymphatic: Negative for adenopathy. Does not bruise/bleed easily.   Skin:  Negative for itching and rash.   Musculoskeletal:  Negative for joint swelling and muscle cramps.   Gastrointestinal:  Negative for abdominal pain, diarrhea, nausea and  vomiting.   Genitourinary:  Negative for dysuria and flank pain.   Neurological:  Negative for numbness and seizures.       II. PHYSICAL EXAM     Physical Exam  Constitutional:       Appearance: Normal appearance. He is normal weight. He is not ill-appearing or diaphoretic.   HENT:      Head: Normocephalic and atraumatic.   Eyes:      General: No scleral icterus.        Right eye: No discharge.         Left eye: No discharge.      Extraocular Movements: Extraocular movements intact.      Conjunctiva/sclera: Conjunctivae normal.   Cardiovascular:      Rate and Rhythm: Normal rate and regular rhythm.   Pulmonary:      Effort: Pulmonary effort is normal. No respiratory distress.   Musculoskeletal:         General: Normal range of motion.      Cervical back: Normal range of motion and neck supple.      Right lower leg: No edema.      Left lower leg: No edema.   Skin:     General: Skin is warm and dry.   Neurological:      General: No focal deficit present.      Mental Status: He is alert and oriented to person, place, and time.   Psychiatric:         Mood and Affect: Mood normal.         Behavior: Behavior normal.       Reticular/Spider veins noted:  RLE: none  LLE: left thigh    Varicose veins noted:  RLE: none  LLE:  none    Imaging Results: (I have personally reviewed the images/studies and provided my interpretation below)  BLE Venous Duplex ultrasound Impression:   Right Leg: Deep Venous system: no DVT, no reflux. GSV: + reflux. LSV: no reflux  Left Leg: Deep Venous system:  no DVT, no reflux. GSV: + reflux. LSV: no reflux    III. ASSESSMENT & PLAN (MEDICAL DECISION MAKING)     1. Spider vein of lower extremity    2. Spider vein, symptomatic          Assessment/Diagnosis and Plan:  67 y.o. male here  for evaluation of symptomatic spider veins with tenderness. CEAP class 1. Ultrasound of lower extremities reveals  evidence of venous insufficiency in the bilateral GSV. I explained that his leg cramps are not related  to his veins. He has spider vein clusters on his left thigh which have the appearance that is high risk for matting if treated with sclerotherapy. I explained this to the patient and would recommend treating these veins with YAG laser to relieve his symptoms. Recommend continuing compression/elevation in the meantime.    -Recommend compression with 20-30mmHg Rx stockings, elevation  -Exercise regularly  -Will refer to dermatology for evaluation for YAG laser treatment of symptomatic spider veins on his left thigh.       SALAZAR Lawson II, MD, [facility]  Vascular Surgery  [facility]  [phone]

## 2022-08-25 ENCOUNTER — TELEPHONE (OUTPATIENT)
Dept: DERMATOLOGY | Facility: CLINIC | Age: 67
End: 2022-08-25
Payer: MEDICARE

## 2022-08-25 NOTE — TELEPHONE ENCOUNTER
Consult scheduled with Dr Leon----- Message from Marquita Robles sent at 8/24/2022  1:00 PM CDT -----  Regarding: Referral  Hi,  Dr. Lawson would like to refer the above patient for laser therapy of spider veins. Referral is in the chart. Please call patient to schedule.  Thank you,  Nurse Marquita

## 2022-08-31 ENCOUNTER — PATIENT MESSAGE (OUTPATIENT)
Dept: SURGERY | Facility: CLINIC | Age: 67
End: 2022-08-31
Payer: MEDICARE

## 2022-09-06 ENCOUNTER — OFFICE VISIT (OUTPATIENT)
Dept: SURGERY | Facility: CLINIC | Age: 67
End: 2022-09-06
Payer: MEDICARE

## 2022-09-06 VITALS
WEIGHT: 263.88 LBS | BODY MASS INDEX: 34.82 KG/M2 | HEART RATE: 75 BPM | DIASTOLIC BLOOD PRESSURE: 80 MMHG | TEMPERATURE: 97 F | SYSTOLIC BLOOD PRESSURE: 174 MMHG

## 2022-09-06 DIAGNOSIS — Z98.890 POST-OPERATIVE STATE: Primary | ICD-10-CM

## 2022-09-06 PROCEDURE — 99999 PR PBB SHADOW E&M-EST. PATIENT-LVL II: ICD-10-PCS | Mod: PBBFAC,,, | Performed by: STUDENT IN AN ORGANIZED HEALTH CARE EDUCATION/TRAINING PROGRAM

## 2022-09-06 PROCEDURE — 99024 PR POST-OP FOLLOW-UP VISIT: ICD-10-PCS | Mod: POP,,, | Performed by: STUDENT IN AN ORGANIZED HEALTH CARE EDUCATION/TRAINING PROGRAM

## 2022-09-06 PROCEDURE — 99999 PR PBB SHADOW E&M-EST. PATIENT-LVL II: CPT | Mod: PBBFAC,,, | Performed by: STUDENT IN AN ORGANIZED HEALTH CARE EDUCATION/TRAINING PROGRAM

## 2022-09-06 PROCEDURE — 99024 POSTOP FOLLOW-UP VISIT: CPT | Mod: POP,,, | Performed by: STUDENT IN AN ORGANIZED HEALTH CARE EDUCATION/TRAINING PROGRAM

## 2022-09-06 PROCEDURE — 99212 OFFICE O/P EST SF 10 MIN: CPT | Mod: PBBFAC,PO | Performed by: STUDENT IN AN ORGANIZED HEALTH CARE EDUCATION/TRAINING PROGRAM

## 2022-09-06 NOTE — PROGRESS NOTES
Postop note     Patient underwent I and D of a back abscess.  He is doing well.  Scant drainage.      Incisions healing well  No evidence of erythema or induration     Overall doing well.  Follow up as needed.

## 2022-09-09 ENCOUNTER — PROCEDURE VISIT (OUTPATIENT)
Dept: DERMATOLOGY | Facility: CLINIC | Age: 67
End: 2022-09-09
Payer: MEDICARE

## 2022-09-09 DIAGNOSIS — I83.90 SPIDER VEIN OF LOWER EXTREMITY: ICD-10-CM

## 2022-09-09 DIAGNOSIS — Z41.1 ENCOUNTER FOR COSMETIC LASER PROCEDURE: Primary | ICD-10-CM

## 2022-09-09 PROCEDURE — 17999 PR V BEAM, 51-150 PULSES: ICD-10-PCS | Mod: CSM,S$GLB,, | Performed by: DERMATOLOGY

## 2022-09-09 PROCEDURE — 99499 NO LOS: ICD-10-PCS | Mod: CSM,S$GLB,, | Performed by: DERMATOLOGY

## 2022-09-09 PROCEDURE — 17999 UNLISTD PX SKN MUC MEMB SUBQ: CPT | Mod: CSM,S$GLB,, | Performed by: DERMATOLOGY

## 2022-09-09 PROCEDURE — 99499 UNLISTED E&M SERVICE: CPT | Mod: CSM,S$GLB,, | Performed by: DERMATOLOGY

## 2022-09-09 NOTE — PROGRESS NOTES
Nilesh Sanchez is a 67 y.o. male who is here today for consult for laser treatment of spider veins on L leg.   Discussed risks, benefits, alternatives, and side effects of PDL and/or NdYAG laser for leg veins. Will start with Vbeam today to minimize pain of treatment.  The patient was given the opportunity to ask any questions, and all questions were answered to the patient's satisfaction.  The patient verbalized understanding, elected to proceed, and signed a written informed consent.

## 2022-10-21 ENCOUNTER — PROCEDURE VISIT (OUTPATIENT)
Dept: DERMATOLOGY | Facility: CLINIC | Age: 67
End: 2022-10-21
Payer: MEDICARE

## 2022-10-21 DIAGNOSIS — I83.90 SPIDER VEIN OF LOWER EXTREMITY: ICD-10-CM

## 2022-10-21 DIAGNOSIS — Z41.1 ENCOUNTER FOR COSMETIC LASER PROCEDURE: Primary | ICD-10-CM

## 2022-10-21 PROCEDURE — 17999 UNLISTD PX SKN MUC MEMB SUBQ: CPT | Mod: CSM,S$GLB,, | Performed by: DERMATOLOGY

## 2022-10-21 PROCEDURE — 17999 PR V BEAM,  25-50 PULSES: ICD-10-PCS | Mod: CSM,S$GLB,, | Performed by: DERMATOLOGY

## 2022-10-21 PROCEDURE — 99499 UNLISTED E&M SERVICE: CPT | Mod: CSM,S$GLB,, | Performed by: DERMATOLOGY

## 2022-10-21 PROCEDURE — 99499 NO LOS: ICD-10-PCS | Mod: CSM,S$GLB,, | Performed by: DERMATOLOGY

## 2022-11-29 ENCOUNTER — PROCEDURE VISIT (OUTPATIENT)
Dept: DERMATOLOGY | Facility: CLINIC | Age: 67
End: 2022-11-29
Payer: MEDICARE

## 2022-11-29 DIAGNOSIS — Z41.1 ENCOUNTER FOR COSMETIC LASER PROCEDURE: Primary | ICD-10-CM

## 2022-11-29 DIAGNOSIS — I83.90 SPIDER VEIN OF LOWER EXTREMITY: ICD-10-CM

## 2022-11-29 PROCEDURE — 17999 PR V BEAM, 0-25 PULSES: ICD-10-PCS | Mod: CSM,S$GLB,, | Performed by: DERMATOLOGY

## 2022-11-29 PROCEDURE — 17999 UNLISTD PX SKN MUC MEMB SUBQ: CPT | Mod: CSM,S$GLB,, | Performed by: DERMATOLOGY

## 2022-11-29 PROCEDURE — 99499 NO LOS: ICD-10-PCS | Mod: CSM,S$GLB,, | Performed by: DERMATOLOGY

## 2022-11-29 PROCEDURE — 99499 UNLISTED E&M SERVICE: CPT | Mod: CSM,S$GLB,, | Performed by: DERMATOLOGY

## 2022-12-29 DIAGNOSIS — K21.9 GASTROESOPHAGEAL REFLUX DISEASE: ICD-10-CM

## 2022-12-29 DIAGNOSIS — E78.2 MIXED HYPERLIPIDEMIA: ICD-10-CM

## 2022-12-29 DIAGNOSIS — I10 ESSENTIAL HYPERTENSION: ICD-10-CM

## 2022-12-29 DIAGNOSIS — F41.9 CHRONIC ANXIETY: ICD-10-CM

## 2022-12-29 RX ORDER — ATORVASTATIN CALCIUM 40 MG/1
TABLET, FILM COATED ORAL
Qty: 90 TABLET | Refills: 0 | OUTPATIENT
Start: 2022-12-29

## 2022-12-29 RX ORDER — METOPROLOL SUCCINATE 50 MG/1
TABLET, EXTENDED RELEASE ORAL
Qty: 90 TABLET | Refills: 0 | OUTPATIENT
Start: 2022-12-29

## 2022-12-29 RX ORDER — LISINOPRIL AND HYDROCHLOROTHIAZIDE 20; 25 MG/1; MG/1
TABLET ORAL
Qty: 90 TABLET | Refills: 0 | OUTPATIENT
Start: 2022-12-29

## 2022-12-29 RX ORDER — PANTOPRAZOLE SODIUM 40 MG/1
TABLET, DELAYED RELEASE ORAL
Qty: 90 TABLET | Refills: 0 | OUTPATIENT
Start: 2022-12-29

## 2022-12-29 RX ORDER — SERTRALINE HYDROCHLORIDE 100 MG/1
TABLET, FILM COATED ORAL
Qty: 90 TABLET | Refills: 0 | OUTPATIENT
Start: 2022-12-29

## 2023-01-02 ENCOUNTER — PATIENT MESSAGE (OUTPATIENT)
Dept: FAMILY MEDICINE | Facility: CLINIC | Age: 68
End: 2023-01-02
Payer: MEDICARE

## 2023-01-02 DIAGNOSIS — F41.9 CHRONIC ANXIETY: ICD-10-CM

## 2023-01-02 DIAGNOSIS — E78.2 MIXED HYPERLIPIDEMIA: ICD-10-CM

## 2023-01-02 DIAGNOSIS — I10 ESSENTIAL HYPERTENSION: ICD-10-CM

## 2023-01-02 DIAGNOSIS — Z12.5 SCREENING FOR MALIGNANT NEOPLASM OF PROSTATE: ICD-10-CM

## 2023-01-02 DIAGNOSIS — K21.9 GASTROESOPHAGEAL REFLUX DISEASE: ICD-10-CM

## 2023-01-02 DIAGNOSIS — J30.9 ALLERGIC RHINOCONJUNCTIVITIS: ICD-10-CM

## 2023-01-02 DIAGNOSIS — H10.10 ALLERGIC RHINOCONJUNCTIVITIS: ICD-10-CM

## 2023-01-02 DIAGNOSIS — R73.03 PREDIABETES: ICD-10-CM

## 2023-01-02 DIAGNOSIS — E66.01 SEVERE OBESITY (BMI 35.0-39.9) WITH COMORBIDITY: Primary | ICD-10-CM

## 2023-01-05 NOTE — TELEPHONE ENCOUNTER
Please book the appointment (doesn't just need labs) and then send back to me to refill medications x 1.

## 2023-01-06 ENCOUNTER — LAB VISIT (OUTPATIENT)
Dept: LAB | Facility: HOSPITAL | Age: 68
End: 2023-01-06
Attending: INTERNAL MEDICINE
Payer: MEDICARE

## 2023-01-06 DIAGNOSIS — Z12.5 SCREENING FOR MALIGNANT NEOPLASM OF PROSTATE: ICD-10-CM

## 2023-01-06 DIAGNOSIS — E66.01 SEVERE OBESITY (BMI 35.0-39.9) WITH COMORBIDITY: ICD-10-CM

## 2023-01-06 DIAGNOSIS — E78.2 MIXED HYPERLIPIDEMIA: ICD-10-CM

## 2023-01-06 DIAGNOSIS — R73.03 PREDIABETES: ICD-10-CM

## 2023-01-06 DIAGNOSIS — I10 ESSENTIAL HYPERTENSION: ICD-10-CM

## 2023-01-06 PROCEDURE — 80053 COMPREHEN METABOLIC PANEL: CPT | Performed by: INTERNAL MEDICINE

## 2023-01-06 PROCEDURE — 84153 ASSAY OF PSA TOTAL: CPT | Performed by: INTERNAL MEDICINE

## 2023-01-06 PROCEDURE — 83036 HEMOGLOBIN GLYCOSYLATED A1C: CPT | Performed by: INTERNAL MEDICINE

## 2023-01-06 PROCEDURE — 80061 LIPID PANEL: CPT | Performed by: INTERNAL MEDICINE

## 2023-01-06 PROCEDURE — 36415 COLL VENOUS BLD VENIPUNCTURE: CPT | Mod: PN | Performed by: INTERNAL MEDICINE

## 2023-01-06 RX ORDER — AZELASTINE 1 MG/ML
SPRAY, METERED NASAL
Qty: 90 ML | Refills: 0 | Status: SHIPPED | OUTPATIENT
Start: 2023-01-06 | End: 2023-01-09 | Stop reason: SDUPTHER

## 2023-01-06 RX ORDER — LISINOPRIL AND HYDROCHLOROTHIAZIDE 20; 25 MG/1; MG/1
1 TABLET ORAL DAILY
Qty: 90 TABLET | Refills: 0 | Status: SHIPPED | OUTPATIENT
Start: 2023-01-06 | End: 2023-01-09 | Stop reason: SDUPTHER

## 2023-01-06 RX ORDER — METOPROLOL SUCCINATE 50 MG/1
50 TABLET, EXTENDED RELEASE ORAL NIGHTLY
Qty: 90 TABLET | Refills: 0 | Status: SHIPPED | OUTPATIENT
Start: 2023-01-06 | End: 2023-01-09 | Stop reason: SDUPTHER

## 2023-01-06 RX ORDER — ATORVASTATIN CALCIUM 40 MG/1
40 TABLET, FILM COATED ORAL NIGHTLY
Qty: 90 TABLET | Refills: 0 | Status: SHIPPED | OUTPATIENT
Start: 2023-01-06 | End: 2023-01-09 | Stop reason: SDUPTHER

## 2023-01-06 RX ORDER — PANTOPRAZOLE SODIUM 40 MG/1
40 TABLET, DELAYED RELEASE ORAL DAILY
Qty: 90 TABLET | Refills: 0 | Status: SHIPPED | OUTPATIENT
Start: 2023-01-06 | End: 2023-01-09 | Stop reason: SDUPTHER

## 2023-01-06 RX ORDER — SERTRALINE HYDROCHLORIDE 100 MG/1
100 TABLET, FILM COATED ORAL DAILY
Qty: 90 TABLET | Refills: 0 | Status: SHIPPED | OUTPATIENT
Start: 2023-01-06 | End: 2023-01-09 | Stop reason: SDUPTHER

## 2023-01-06 NOTE — TELEPHONE ENCOUNTER
While I am glad the lab apt was re-booked, he still has not been booked for an appointment, so I can't send the refills. Please send back to me AFTER booking an appointment.

## 2023-01-07 LAB
ALBUMIN SERPL BCP-MCNC: 4 G/DL (ref 3.5–5.2)
ALP SERPL-CCNC: 99 U/L (ref 55–135)
ALT SERPL W/O P-5'-P-CCNC: 41 U/L (ref 10–44)
ANION GAP SERPL CALC-SCNC: 11 MMOL/L (ref 8–16)
AST SERPL-CCNC: 34 U/L (ref 10–40)
BILIRUB SERPL-MCNC: 0.5 MG/DL (ref 0.1–1)
BUN SERPL-MCNC: 17 MG/DL (ref 8–23)
CALCIUM SERPL-MCNC: 10 MG/DL (ref 8.7–10.5)
CHLORIDE SERPL-SCNC: 100 MMOL/L (ref 95–110)
CHOLEST SERPL-MCNC: 229 MG/DL (ref 120–199)
CHOLEST/HDLC SERPL: 4.6 {RATIO} (ref 2–5)
CO2 SERPL-SCNC: 34 MMOL/L (ref 23–29)
COMPLEXED PSA SERPL-MCNC: 0.45 NG/ML (ref 0–4)
CREAT SERPL-MCNC: 1.1 MG/DL (ref 0.5–1.4)
EST. GFR  (NO RACE VARIABLE): >60 ML/MIN/1.73 M^2
ESTIMATED AVG GLUCOSE: 131 MG/DL (ref 68–131)
GLUCOSE SERPL-MCNC: 101 MG/DL (ref 70–110)
HBA1C MFR BLD: 6.2 % (ref 4–5.6)
HDLC SERPL-MCNC: 50 MG/DL (ref 40–75)
HDLC SERPL: 21.8 % (ref 20–50)
LDLC SERPL CALC-MCNC: 116.6 MG/DL (ref 63–159)
NONHDLC SERPL-MCNC: 179 MG/DL
POTASSIUM SERPL-SCNC: 4.9 MMOL/L (ref 3.5–5.1)
PROT SERPL-MCNC: 7.3 G/DL (ref 6–8.4)
SODIUM SERPL-SCNC: 145 MMOL/L (ref 136–145)
TRIGL SERPL-MCNC: 312 MG/DL (ref 30–150)

## 2023-01-09 ENCOUNTER — PATIENT MESSAGE (OUTPATIENT)
Dept: FAMILY MEDICINE | Facility: CLINIC | Age: 68
End: 2023-01-09
Payer: MEDICARE

## 2023-01-09 DIAGNOSIS — I10 ESSENTIAL HYPERTENSION: ICD-10-CM

## 2023-01-09 DIAGNOSIS — H10.10 ALLERGIC RHINOCONJUNCTIVITIS: ICD-10-CM

## 2023-01-09 DIAGNOSIS — J30.9 ALLERGIC RHINOCONJUNCTIVITIS: ICD-10-CM

## 2023-01-09 DIAGNOSIS — K21.9 GASTROESOPHAGEAL REFLUX DISEASE: ICD-10-CM

## 2023-01-09 DIAGNOSIS — E78.2 MIXED HYPERLIPIDEMIA: ICD-10-CM

## 2023-01-09 DIAGNOSIS — F41.9 CHRONIC ANXIETY: ICD-10-CM

## 2023-01-10 RX ORDER — AZELASTINE 1 MG/ML
SPRAY, METERED NASAL
Qty: 90 ML | Refills: 0 | Status: SHIPPED | OUTPATIENT
Start: 2023-01-10 | End: 2024-01-24

## 2023-01-10 RX ORDER — LISINOPRIL AND HYDROCHLOROTHIAZIDE 20; 25 MG/1; MG/1
1 TABLET ORAL DAILY
Qty: 90 TABLET | Refills: 0 | Status: SHIPPED | OUTPATIENT
Start: 2023-01-10 | End: 2023-01-12

## 2023-01-10 RX ORDER — ATORVASTATIN CALCIUM 40 MG/1
40 TABLET, FILM COATED ORAL NIGHTLY
Qty: 90 TABLET | Refills: 0 | Status: ON HOLD | OUTPATIENT
Start: 2023-01-10 | End: 2023-03-06 | Stop reason: SDUPTHER

## 2023-01-10 RX ORDER — METOPROLOL SUCCINATE 50 MG/1
50 TABLET, EXTENDED RELEASE ORAL NIGHTLY
Qty: 90 TABLET | Refills: 0 | Status: SHIPPED | OUTPATIENT
Start: 2023-01-10 | End: 2023-04-10

## 2023-01-10 RX ORDER — PANTOPRAZOLE SODIUM 40 MG/1
40 TABLET, DELAYED RELEASE ORAL DAILY
Qty: 90 TABLET | Refills: 0 | Status: SHIPPED | OUTPATIENT
Start: 2023-01-10 | End: 2023-04-10

## 2023-01-10 RX ORDER — SERTRALINE HYDROCHLORIDE 100 MG/1
100 TABLET, FILM COATED ORAL DAILY
Qty: 90 TABLET | Refills: 0 | Status: SHIPPED | OUTPATIENT
Start: 2023-01-10 | End: 2023-04-10

## 2023-01-12 ENCOUNTER — OFFICE VISIT (OUTPATIENT)
Dept: FAMILY MEDICINE | Facility: CLINIC | Age: 68
End: 2023-01-12
Payer: MEDICARE

## 2023-01-12 VITALS
DIASTOLIC BLOOD PRESSURE: 76 MMHG | WEIGHT: 267.31 LBS | OXYGEN SATURATION: 95 % | HEART RATE: 70 BPM | RESPIRATION RATE: 18 BRPM | HEIGHT: 73 IN | SYSTOLIC BLOOD PRESSURE: 138 MMHG | BODY MASS INDEX: 35.43 KG/M2

## 2023-01-12 DIAGNOSIS — E66.01 SEVERE OBESITY (BMI 35.0-39.9) WITH COMORBIDITY: ICD-10-CM

## 2023-01-12 DIAGNOSIS — K21.9 GASTROESOPHAGEAL REFLUX DISEASE, UNSPECIFIED WHETHER ESOPHAGITIS PRESENT: ICD-10-CM

## 2023-01-12 DIAGNOSIS — I10 ESSENTIAL HYPERTENSION: Primary | ICD-10-CM

## 2023-01-12 DIAGNOSIS — E78.2 MIXED HYPERLIPIDEMIA: ICD-10-CM

## 2023-01-12 DIAGNOSIS — R73.03 PREDIABETES: ICD-10-CM

## 2023-01-12 DIAGNOSIS — F41.9 CHRONIC ANXIETY: ICD-10-CM

## 2023-01-12 PROCEDURE — 99214 PR OFFICE/OUTPT VISIT, EST, LEVL IV, 30-39 MIN: ICD-10-PCS | Mod: S$PBB,,, | Performed by: INTERNAL MEDICINE

## 2023-01-12 PROCEDURE — 99214 OFFICE O/P EST MOD 30 MIN: CPT | Mod: S$PBB,,, | Performed by: INTERNAL MEDICINE

## 2023-01-12 PROCEDURE — 99999 PR PBB SHADOW E&M-EST. PATIENT-LVL III: CPT | Mod: PBBFAC,,, | Performed by: INTERNAL MEDICINE

## 2023-01-12 PROCEDURE — 99999 PR PBB SHADOW E&M-EST. PATIENT-LVL III: ICD-10-PCS | Mod: PBBFAC,,, | Performed by: INTERNAL MEDICINE

## 2023-01-12 PROCEDURE — 99213 OFFICE O/P EST LOW 20 MIN: CPT | Mod: PBBFAC,PN | Performed by: INTERNAL MEDICINE

## 2023-01-12 RX ORDER — HYDROCHLOROTHIAZIDE 25 MG/1
25 TABLET ORAL DAILY
Qty: 90 TABLET | Refills: 1 | Status: SHIPPED | OUTPATIENT
Start: 2023-01-12 | End: 2023-03-02 | Stop reason: SDUPTHER

## 2023-01-12 RX ORDER — VALSARTAN 320 MG/1
320 TABLET ORAL DAILY
Qty: 90 TABLET | Refills: 1 | Status: SHIPPED | OUTPATIENT
Start: 2023-01-12 | End: 2023-01-17

## 2023-01-12 RX ORDER — TADALAFIL 20 MG/1
20 TABLET ORAL
COMMUNITY
Start: 2022-10-08 | End: 2023-06-13

## 2023-01-12 NOTE — PROGRESS NOTES
Assessment and Plan:    1. Essential hypertension  Borderline today and high at last several visits. Recommend stopping lisinopril 20 in favor of valsartan 320. Send BP log in 2 weeks.  - hydroCHLOROthiazide (HYDRODIURIL) 25 MG tablet; Take 1 tablet (25 mg total) by mouth once daily.  Dispense: 90 tablet; Refill: 1  - valsartan (DIOVAN) 320 MG tablet; Take 1 tablet (320 mg total) by mouth once daily.  Dispense: 90 tablet; Refill: 1    2. Mixed hyperlipidemia  Continue atorvastatin.    3. Chronic anxiety  Doing well, continue sertraline.    4. Prediabetes  A1c increasing, discussed diet changes in detail.    5. Gastroesophageal reflux disease, unspecified whether esophagitis present  Continue PPI    6. Severe obesity (BMI 35.0-39.9) with comorbidity  Discussed diet and exercise in detail today.    ______________________________________________________________________  Subjective:    Chief Complaint:  Follow up chronic medical conditions.    HPI:  Nilesh is a 67 y.o. year old male here to follow up chronic medical conditions.     HTN- Takes lisinopril, HCTZ, and metoprolol for BP. Has historically been at goal on this. No problems with the medications, BP has been controlled. He has been checking this only sporadically at home, but it has been good when checked.     HLD- Takes atorvastatin, no problems with this.     PreDM- A1c now up to 6.2, was 6.0 last year. He has not really been watching his diet at all, had been eating more sweets with the holidays. Has not really been getting a ton of exercise.      Chronic anxiety- Takes sertraline and doing well with this.      GERD- Taking PPI and not having symptoms at this time.     Medications:  Current Outpatient Medications on File Prior to Visit   Medication Sig Dispense Refill    atorvastatin (LIPITOR) 40 MG tablet Take 1 tablet (40 mg total) by mouth every evening. 90 tablet 0    azelastine (ASTELIN) 137 mcg (0.1 %) nasal spray INSTILL 1 SPRAY (137 MCG TOTAL) BY  "NASAL ROUTE 2 (TWO) TIMES DAILY. 90 mL 0    metoprolol succinate (TOPROL-XL) 50 MG 24 hr tablet Take 1 tablet (50 mg total) by mouth every evening. 90 tablet 0    pantoprazole (PROTONIX) 40 MG tablet Take 1 tablet (40 mg total) by mouth once daily. 90 tablet 0    sertraline (ZOLOFT) 100 MG tablet Take 1 tablet (100 mg total) by mouth once daily. 90 tablet 0    tadalafiL (CIALIS) 20 MG Tab Take 20 mg by mouth.      [DISCONTINUED] lisinopriL-hydrochlorothiazide (PRINZIDE,ZESTORETIC) 20-25 mg Tab Take 1 tablet by mouth once daily. 90 tablet 0    [DISCONTINUED] sildenafiL (VIAGRA) 100 MG tablet Take 1 tablet (100 mg total) by mouth daily as needed for Erectile Dysfunction. 10 tablet 11     No current facility-administered medications on file prior to visit.       Review of Systems:  Review of Systems   Constitutional:  Negative for chills and fever.   Respiratory:  Negative for shortness of breath and wheezing.    Cardiovascular:  Negative for chest pain and leg swelling.   Gastrointestinal:  Negative for diarrhea, nausea and vomiting.   Neurological:  Negative for dizziness, syncope and light-headedness.     Past Medical History:  Past Medical History:   Diagnosis Date    Allergic rhinosinusitis     Anxiety     Back pain     with spasms    Diverticulitis     s/p partial colectomy    GERD (gastroesophageal reflux disease)     Hx of colonic polyps     Hyperlipidemia     Hypertension        Objective:    Vitals:  Vitals:    01/12/23 1358 01/12/23 1440   BP: (!) 144/84 138/76   Pulse: 70    Resp: 18    SpO2: 95%    Weight: 121.2 kg (267 lb 4.9 oz)    Height: 6' 1" (1.854 m)    PainSc: 0-No pain        Physical Exam  Vitals reviewed.   Constitutional:       General: He is not in acute distress.     Appearance: He is well-developed.   Eyes:      General: No scleral icterus.  Cardiovascular:      Rate and Rhythm: Normal rate and regular rhythm.      Heart sounds: No murmur heard.  Pulmonary:      Effort: Pulmonary effort is " normal. No respiratory distress.      Breath sounds: Normal breath sounds. No wheezing, rhonchi or rales.   Musculoskeletal:      Right lower leg: No edema.      Left lower leg: No edema.   Skin:     General: Skin is warm and dry.   Neurological:      Mental Status: He is alert and oriented to person, place, and time.   Psychiatric:         Behavior: Behavior normal.       Data:  A1c 6.2  PSA normal      Flavia Lange MD  Internal Medicine

## 2023-01-17 ENCOUNTER — TELEPHONE (OUTPATIENT)
Dept: FAMILY MEDICINE | Facility: CLINIC | Age: 68
End: 2023-01-17
Payer: MEDICARE

## 2023-01-17 NOTE — TELEPHONE ENCOUNTER
Spoke with pharmacy and confirmed change of therapy per LOV note.   Pharmacy verbalized understanding.

## 2023-01-17 NOTE — TELEPHONE ENCOUNTER
----- Message from Rosemary Gruber, Patient Care Assistant sent at 1/17/2023  4:19 PM CST -----  Regarding: advice  Contact: darrel with [facility] pharmacy  Type: Needs Medical Advice  Who Called:  darrel with [facility] pharmacy    Pharmacy name and phone #:    [facility] [address] [facility]  [place] [address]  Phone: [phone] Fax: [phone]    Best Call Back Number:  opt number 2 reference # [id number]     Additional Information: darrel with [facility] pharmacy states she would like a callback regarding valsartan (DIOVAN) 320 MG tablet and tadalafiL (CIALIS) 20 MG Tab. Please call pt to advise. Thanks!

## 2023-01-31 ENCOUNTER — OFFICE VISIT (OUTPATIENT)
Dept: FAMILY MEDICINE | Facility: CLINIC | Age: 68
End: 2023-01-31
Payer: MEDICARE

## 2023-01-31 ENCOUNTER — PATIENT MESSAGE (OUTPATIENT)
Dept: FAMILY MEDICINE | Facility: CLINIC | Age: 68
End: 2023-01-31

## 2023-01-31 ENCOUNTER — PATIENT MESSAGE (OUTPATIENT)
Dept: FAMILY MEDICINE | Facility: CLINIC | Age: 68
End: 2023-01-31
Payer: MEDICARE

## 2023-01-31 VITALS — DIASTOLIC BLOOD PRESSURE: 86 MMHG | SYSTOLIC BLOOD PRESSURE: 188 MMHG | HEART RATE: 62 BPM

## 2023-01-31 DIAGNOSIS — I16.0 HYPERTENSIVE URGENCY: Primary | ICD-10-CM

## 2023-01-31 PROCEDURE — 99213 OFFICE O/P EST LOW 20 MIN: CPT | Mod: S$PBB,,, | Performed by: STUDENT IN AN ORGANIZED HEALTH CARE EDUCATION/TRAINING PROGRAM

## 2023-01-31 PROCEDURE — 99999 PR PBB SHADOW E&M-EST. PATIENT-LVL II: CPT | Mod: PBBFAC,,, | Performed by: STUDENT IN AN ORGANIZED HEALTH CARE EDUCATION/TRAINING PROGRAM

## 2023-01-31 PROCEDURE — 99999 PR PBB SHADOW E&M-EST. PATIENT-LVL II: ICD-10-PCS | Mod: PBBFAC,,, | Performed by: STUDENT IN AN ORGANIZED HEALTH CARE EDUCATION/TRAINING PROGRAM

## 2023-01-31 PROCEDURE — 99213 PR OFFICE/OUTPT VISIT, EST, LEVL III, 20-29 MIN: ICD-10-PCS | Mod: S$PBB,,, | Performed by: STUDENT IN AN ORGANIZED HEALTH CARE EDUCATION/TRAINING PROGRAM

## 2023-01-31 PROCEDURE — 99212 OFFICE O/P EST SF 10 MIN: CPT | Mod: PBBFAC,PN | Performed by: STUDENT IN AN ORGANIZED HEALTH CARE EDUCATION/TRAINING PROGRAM

## 2023-01-31 NOTE — PROGRESS NOTES
Plan:      Nilesh was seen today for hypertension.    Diagnoses and all orders for this visit:    Hypertensive urgency    Given recent history of head trauma and hypertensive urgency, advised patient to go to [facility] [place] for further evaluation and treatment.  Patient agreeable to plan, expressed understanding, all questions answered, close precautions given.    Luci Vera MD  01/31/2023    Subjective:      Patient ID: Nilesh Sanchez is a 67 y.o. male    Chief Complaint   Patient presents with    Hypertension     HPI  67 y.o. male with a PMHx as documented below presents to clinic today for the following:    S/p ground level fall w/ head trauma about 1 week ago. Pt reports tenderness on scalp at point of impact. Additional symptoms include lightheadedness (brief, intermittent). Otherwise, no symptoms or focal neurologic symptoms noted.    Additionally, pt previously on lisinopril - recently changed to valsartan 320 mg daily and HCTZ 25 mg daily.     Home BP readings: 214/109, 192/109    Past Medical History:   Diagnosis Date    Allergic rhinosinusitis     Anxiety     Back pain     with spasms    Diverticulitis     s/p partial colectomy    GERD (gastroesophageal reflux disease)     Hx of colonic polyps     Hyperlipidemia     Hypertension       Current Outpatient Medications   Medication Instructions    atorvastatin (LIPITOR) 40 mg, Oral, Nightly    azelastine (ASTELIN) 137 mcg (0.1 %) nasal spray INSTILL 1 SPRAY (137 MCG TOTAL) BY NASAL ROUTE 2 (TWO) TIMES DAILY.    hydroCHLOROthiazide (HYDRODIURIL) 25 mg, Oral, Daily    metoprolol succinate (TOPROL-XL) 50 mg, Oral, Nightly    pantoprazole (PROTONIX) 40 mg, Oral, Daily    sertraline (ZOLOFT) 100 mg, Oral, Daily    tadalafiL (CIALIS) 20 mg, Oral    valsartan (DIOVAN) 320 MG tablet TAKE 1 TABLET ONCE DAILY.      ROS  Constitutional:  Negative for chills and fever.   Respiratory:  Negative for shortness of breath.     Cardiovascular:  Negative for chest pain.   Gastrointestinal:  Negative for abdominal pain, constipation, diarrhea, nausea and vomiting.     Objective:      Vitals:    01/31/23 1458   BP: (!) 188/86   Pulse: 62     There is no height or weight on file to calculate BMI.    Physical Exam   Constitutional:       General: She is not in acute distress.  HENT:      Head: Normocephalic and atraumatic.   Pulmonary:      Effort: Pulmonary effort is normal. No respiratory distress.   Neurological:      General: No focal deficit present.      Mental Status: She is alert and oriented to person, place, and time. Mental status is at baseline.     Assessment:       1. Hypertensive urgency          Luci Vera MD  [facility]  Office: [phone]   Fax: [phone]  01/31/2023      Disclaimer: This note was partly generated using dictation software which may occasionally result in transcription errors.    Total time spent on this encounter includes face to face time and non-face to face time preparing to see the patient (eg, review of tests), obtaining and/or reviewing separately obtained history, documenting clinical information in the electronic or other health record, independently interpreting results and communicating results to the patient/family/caregiver, or care coordinator.

## 2023-02-16 ENCOUNTER — PATIENT MESSAGE (OUTPATIENT)
Dept: FAMILY MEDICINE | Facility: CLINIC | Age: 68
End: 2023-02-16
Payer: MEDICARE

## 2023-02-16 DIAGNOSIS — I10 ESSENTIAL HYPERTENSION: Primary | ICD-10-CM

## 2023-02-16 RX ORDER — LISINOPRIL 20 MG/1
20 TABLET ORAL 2 TIMES DAILY
Qty: 180 TABLET | Refills: 0 | Status: SHIPPED | OUTPATIENT
Start: 2023-02-16 | End: 2023-05-16

## 2023-02-27 ENCOUNTER — PATIENT MESSAGE (OUTPATIENT)
Dept: FAMILY MEDICINE | Facility: CLINIC | Age: 68
End: 2023-02-27
Payer: MEDICARE

## 2023-02-27 DIAGNOSIS — I10 ESSENTIAL HYPERTENSION: ICD-10-CM

## 2023-03-02 RX ORDER — HYDROCHLOROTHIAZIDE 25 MG/1
25 TABLET ORAL DAILY
Qty: 90 TABLET | Refills: 1 | Status: SHIPPED | OUTPATIENT
Start: 2023-03-02 | End: 2023-09-11 | Stop reason: SDUPTHER

## 2023-03-03 ENCOUNTER — OFFICE VISIT (OUTPATIENT)
Dept: CARDIOLOGY | Facility: CLINIC | Age: 68
End: 2023-03-03
Payer: MEDICARE

## 2023-03-03 VITALS
SYSTOLIC BLOOD PRESSURE: 184 MMHG | WEIGHT: 272.5 LBS | DIASTOLIC BLOOD PRESSURE: 99 MMHG | HEART RATE: 57 BPM | HEIGHT: 73 IN | BODY MASS INDEX: 36.11 KG/M2

## 2023-03-03 DIAGNOSIS — E66.01 SEVERE OBESITY (BMI 35.0-39.9) WITH COMORBIDITY: ICD-10-CM

## 2023-03-03 DIAGNOSIS — I25.110 ATHEROSCLEROSIS OF NATIVE CORONARY ARTERY OF NATIVE HEART WITH UNSTABLE ANGINA PECTORIS: Primary | ICD-10-CM

## 2023-03-03 DIAGNOSIS — R06.02 SOB (SHORTNESS OF BREATH): ICD-10-CM

## 2023-03-03 DIAGNOSIS — I10 ESSENTIAL HYPERTENSION: ICD-10-CM

## 2023-03-03 DIAGNOSIS — E78.2 MIXED HYPERLIPIDEMIA: ICD-10-CM

## 2023-03-03 DIAGNOSIS — R73.03 PREDIABETES: ICD-10-CM

## 2023-03-03 PROCEDURE — 99999 PR PBB SHADOW E&M-EST. PATIENT-LVL III: ICD-10-PCS | Mod: PBBFAC,,, | Performed by: INTERNAL MEDICINE

## 2023-03-03 PROCEDURE — 99205 OFFICE O/P NEW HI 60 MIN: CPT | Mod: S$PBB,25,, | Performed by: INTERNAL MEDICINE

## 2023-03-03 PROCEDURE — 93005 ELECTROCARDIOGRAM TRACING: CPT | Mod: PBBFAC | Performed by: INTERNAL MEDICINE

## 2023-03-03 PROCEDURE — 93010 ELECTROCARDIOGRAM REPORT: CPT | Mod: S$PBB,,, | Performed by: INTERNAL MEDICINE

## 2023-03-03 PROCEDURE — 99999 PR PBB SHADOW E&M-EST. PATIENT-LVL III: CPT | Mod: PBBFAC,,, | Performed by: INTERNAL MEDICINE

## 2023-03-03 PROCEDURE — 99205 PR OFFICE/OUTPT VISIT, NEW, LEVL V, 60-74 MIN: ICD-10-PCS | Mod: S$PBB,25,, | Performed by: INTERNAL MEDICINE

## 2023-03-03 PROCEDURE — 93010 EKG 12-LEAD: ICD-10-PCS | Mod: S$PBB,,, | Performed by: INTERNAL MEDICINE

## 2023-03-03 PROCEDURE — 99213 OFFICE O/P EST LOW 20 MIN: CPT | Mod: PBBFAC | Performed by: INTERNAL MEDICINE

## 2023-03-03 RX ORDER — SODIUM CHLORIDE 0.9 % (FLUSH) 0.9 %
2 SYRINGE (ML) INJECTION
Status: CANCELLED | OUTPATIENT
Start: 2023-03-03

## 2023-03-03 RX ORDER — ASPIRIN 81 MG/1
81 TABLET ORAL DAILY
Refills: 0
Start: 2023-03-03 | End: 2024-03-02

## 2023-03-03 RX ORDER — SODIUM CHLORIDE 9 MG/ML
INJECTION, SOLUTION INTRAVENOUS CONTINUOUS
Status: CANCELLED | OUTPATIENT
Start: 2023-03-03 | End: 2023-03-03

## 2023-03-03 RX ORDER — AMLODIPINE BESYLATE 5 MG/1
5 TABLET ORAL NIGHTLY
Qty: 30 TABLET | Refills: 11 | Status: SHIPPED | OUTPATIENT
Start: 2023-03-03 | End: 2023-06-13 | Stop reason: SDUPTHER

## 2023-03-03 RX ORDER — NITROGLYCERIN 0.3 MG/1
0.3 TABLET SUBLINGUAL EVERY 5 MIN PRN
Qty: 100 TABLET | Refills: 3 | Status: SHIPPED | OUTPATIENT
Start: 2023-03-03 | End: 2024-01-29 | Stop reason: SDUPTHER

## 2023-03-03 NOTE — PATIENT INSTRUCTIONS
"I recommend the book, "The Obesity Code" for weight loss; it recommends intermittent fasting and avoidance of sugar, artificial sweeteners and refined carbohydrates.    Also, here is information on a Mediterranean type diet including fish, the pesco-mediterranean diet from the American College of Cardiology:    1.  Humans are evolutionarily adapted to obtain calories and nutrients from both plant and animal food sources. Many people overconsume animal products, often-processed meats high in saturated fats and chemical additives. In contrast, while strict veganism has gained popularity for many reasons and has value in certain groups, it can cause nutritional deficiencies (vitamin B12, high-quality proteins, iron, zinc, omega-3 fatty acid, vitamin D, and calcium), and predispose to osteopenia, loss of muscle mass, and anemia. This is not true of a lacto-ovo vegetarian diet, which allows no animal-based food except for eggs and dairy. A 6-year study of [id number] [place] [employer] reported a decreased incidence of all-cause mortality when comparing vegetarians with nonvegetarians. However, when the vegetarians were stratified into vegans, lacto-ovo vegetarians, pesco-vegetarians, and semi-vegetarians, the pesco-vegetarians had lowest risks for all-cause mortality, cardiovascular disease (CVD) mortality, and mortality from other causes.     2.  The authors propose a plant-rich diet rich in nuts with fish and seafood as the principle source of animal food. Known as the Pesco-Mediterranean diet, it is supplemented with extra-virgin olive oil (EVOO), which is the principle fat source, along with moderate amounts of dairy (particularly yogurt and cheese) and eggs, as well as modest amounts of alcohol consumption (ideally red wine with the evening meal), but few red and processed meats.     3.  Both epidemiological studies and randomized clinical trials indicate that the traditional Mediterranean diet is associated with " lower risks for all-cause and CVD mortality, coronary heart disease, metabolic syndrome, diabetes, cognitive decline, neurodegenerative diseases (including Alzheimers), depression, overall cancer mortality, and breast and colorectal cancers.     4.  The traditional Mediterranean diet has been endorsed in the most recent Dietary Guidelines for Americans and the American College of Cardiology/American Heart Association guidelines. The 2020 U[employer]. News & World Report ranked it #1 for overall health based upon it being nutritious, safe, relatively easy to follow, protective against CVD and diabetes, and effective for weight loss.     5.  Fish and seafood are important sources of vitamins protein and omega-3 fatty acids, of which the higher blood and adipose tissue are associated with reduced fatal and nonfatal myocardial infarction. When not fried, fish consumption has been associated with reduced risk of heart failure, and the incidence of the metabolic syndrome, coronary heart disease, ischemic stroke, and sudden cardiac death, particularly when seafood replaces less healthy foods.     6.  Unrestricted use of olive oil in the kitchen, on salads (with vinegar), cooking vegetables, and at the table is the foundation of the traditional Mediterranean diet, although olive oil quality is crucial, which makes it expensive. EVOO retains hydrophilic components of olives including highly bioactive polyphenols, which are believed to underlie many of EVOOs cardiometabolic benefits, such as reduced low-density lipoprotein cholesterol (LDL-C) and increased high-density lipoprotein cholesterol (HDL-C), improved vascular reactivity, enhanced HDL particle functionality, and a lower diabetes risk.     7.  Tree nuts, an integral component of the traditional Mediterranean diet, are nutrient dense rich in unsaturated fats, fiber, protein, polyphenols, phytosterols, and tocopherols. Nut consumption is associated with decreased incidence  and mortality rates from both CVD and coronary artery disease (CAD), as well as atrial fibrillation and diabetes. Randomized controlled trials have shown that diets enriched with nuts produce cardiometabolic benefits including improvements in insulin sensitivity, LDL-C, inflammation, and vascular reactivity. A 1-daily serving of mixed nuts resulted in a 28% reduction in CVD risk. Generous intake of nuts does not promote weight gain because of increased satiety and incomplete digestion.     8.  Legumes are an excellent source of vegetable protein, folate, and magnesium and fiber, and like other seeds including peanuts, are rich in polyphenols. Consumption of legumes has been linked to a reduced risk of incident and fatal CVD and CAD, as well as improvements in blood glucose, cholesterol, blood pressure, and body weight. Legumes, like fish, are a satiating and healthy substitute for red meat and processed meats.     9.  Dairy products and eggs are important sources of protein, nonsodium minerals, probiotics, and vitamin D. Although there is no clear consensus among nutrition experts on the role of dairy products in CVD risk, they are allowed in this Pesco-Mediterranean diet. Fermented low-fat versions, such as yogurt and soft cheeses, are preferred; butter and hard cheese are high in saturated fats and salt.     10.  Eggs are composed of beneficial nutrients including all essential amino acids, in addition to minerals (selenium, phosphorus, iodine, zinc), vitamins (A, D, B2, B12, niacin), and carotenoids (lutein, zeaxanthin). Although each yolk contains about 184 mg of dietary cholesterol, large prospective cohorts suggest that egg consumption is unrelated to serum cholesterol and does not increase CVD risk. Eggs are allowed in the Pesco-Mediterranean diet; egg whites are unlimited and preferably no more than 5 yolks/week.     11.  Whole grains, such as barley, whole oats, rye, corn, buckwheat, brown rice, and quinoa,  are an integral part of the traditional Mediterranean diet. Pasta is an example of a starchy food that has a low glycemic index despite being a refined carbohydrate. In the context of a low glycemic index dietary pattern such as the Mediterranean diet, pasta does not adversely affect adiposity and may even help reduce body weight and there is no evidence that pasta promotes cardiometabolic risk factors. White rice is associated with type 2 diabetes mellitus in Asians but not in Western cohorts, possibly because it is cooked and served plain in [place] and in Western cultures cooked in mixed dishes with vegetables and vegetable oil including EVOO.     12.  The staple beverage of the Pesco-Mediterranean diet is water--which can be flavored but not sweetened. Unsweetened tea and coffee are rich in antioxidants and are associated with improved CVD outcomes. If alcohol is consumed at all, dry red wine is recommended, with the ideal amount being a single glass (6 oz) for women and 1 or 2 glasses/day for men (6-12 oz) consumed with meals.     13.  Time-restricted eating, a type of intermittent fasting, is the practice of limiting the daily intake of calories to a window of time usually between 6-12 hours each day. Intermittent fasting when done on a regular basis has been shown to decrease intra-abdominal adipose tissue and reduce free-radical production. This elicits powerful cellular responses that improve glucose metabolism and reduce systemic inflammation, and may also reduce risks of diabetes, CVD, cancer, and neurodegenerative diseases. After a 12-hour overnight fast, insulin levels are typically low, and glycogen stores have been depleted. In this fasted state, the body starts mobilizing fatty acids from adipose cells to burn as metabolic fuel instead of glucose. This improves insulin sensitivity. Time-restricted eating is not more effective for weight loss than standard calorie-restriction, but appears to enhance CV  health even in nonobese people. Fasting may also lower blood pressure and resting heart rate and improve autonomic balance with augmented heart rate variability.     14.  The evidence regarding time-restricted eating is mostly based on animal models and observational human studies. The most popular form of time-restricted eating involves eating two rather than three meals and compressing the calorie-consumption window. No head-to-head studies have been performed to assess the optimal time window.

## 2023-03-03 NOTE — H&P (VIEW-ONLY)
Subjective:   03/03/2023     Patient ID:  Nilesh Sanchez is a 67 y.o. male who presents for evaulation of Shortness of Breath, Dizziness, Chest Pain, and Fatigue      Comes in today for evaluation of increasing shortness of breath and chest discomfort.  Yesterday, he had chest tightness lasting 3 hours with associated shortness of breath.  Shortness of breath is worse with activity and he also had exertional chest pain.  That has been present for last 48 hours.  He is also had fairly severe hypertension over the last several weeks with medication changes.  He was switched from lisinopril HCT to valsartan, but became dizzy.  That was discontinued and he was restarted on lisinopril at a higher dose, 20 mg twice a day, but he did not take the hydrochlorothiazide in his been off of hydrochlorothiazide last 2 weeks.  He only resume that yesterday and took another dose today.    He does not have a history of coronary artery disease, cardiac catheterization 20 years ago did not show severe blockages.  Always has an abnormal EKG suggestive of anterior wall infarct, this has never been present though.    Hypertension is present and continues, his blood pressure today lying is 172/94, standing 159/94, pulse 58.  Other antihypertensive medications include metoprolol.      Hyper lipidemia is present.  Recently, he was found have a total cholesterol 229, HDL 50, , triglycerides 312.      He does have prediabetes.      Past Medical History:   Diagnosis Date    Allergic rhinosinusitis     Anxiety     Back pain     with spasms    Diverticulitis     s/p partial colectomy    GERD (gastroesophageal reflux disease)     Hx of colonic polyps        Review of patient's allergies indicates:  No Known Allergies      Current Outpatient Medications:     atorvastatin (LIPITOR) 40 MG tablet, Take 1 tablet (40 mg total) by mouth every evening., Disp: 90 tablet, Rfl: 0    azelastine (ASTELIN) 137 mcg (0.1 %) nasal spray, INSTILL 1 SPRAY  (137 MCG TOTAL) BY NASAL ROUTE 2 (TWO) TIMES DAILY., Disp: 90 mL, Rfl: 0    hydroCHLOROthiazide (HYDRODIURIL) 25 MG tablet, Take 1 tablet (25 mg total) by mouth once daily., Disp: 90 tablet, Rfl: 1    lisinopriL (PRINIVIL,ZESTRIL) 20 MG tablet, Take 1 tablet (20 mg total) by mouth 2 (two) times a day., Disp: 180 tablet, Rfl: 0    metoprolol succinate (TOPROL-XL) 50 MG 24 hr tablet, Take 1 tablet (50 mg total) by mouth every evening., Disp: 90 tablet, Rfl: 0    pantoprazole (PROTONIX) 40 MG tablet, Take 1 tablet (40 mg total) by mouth once daily., Disp: 90 tablet, Rfl: 0    sertraline (ZOLOFT) 100 MG tablet, Take 1 tablet (100 mg total) by mouth once daily., Disp: 90 tablet, Rfl: 0    tadalafiL (CIALIS) 20 MG Tab, Take 20 mg by mouth., Disp: , Rfl:      Objective:   Review of Systems   Cardiovascular:  Positive for chest pain and dyspnea on exertion. Negative for claudication, cyanosis, irregular heartbeat, leg swelling, near-syncope, orthopnea, palpitations, paroxysmal nocturnal dyspnea and syncope.       Vitals:    03/03/23 0817   BP: (!) 184/99   Pulse: (!) 57     Wt Readings from Last 3 Encounters:   03/03/23 123.6 kg (272 lb 7.8 oz)   01/12/23 121.2 kg (267 lb 4.9 oz)   09/06/22 119.7 kg (263 lb 14.3 oz)     Temp Readings from Last 3 Encounters:   09/06/22 97.3 °F (36.3 °C)   08/24/22 97.3 °F (36.3 °C)   08/16/22 97.9 °F (36.6 °C) (Oral)     BP Readings from Last 3 Encounters:   03/03/23 (!) 184/99   01/31/23 (!) 188/86   01/12/23 138/76     Pulse Readings from Last 3 Encounters:   03/03/23 (!) 57   01/31/23 62   01/12/23 70             Physical Exam  Vitals reviewed.   Constitutional:       General: He is not in acute distress.     Appearance: He is well-developed.   HENT:      Head: Normocephalic and atraumatic.      Nose: Nose normal.   Eyes:      Conjunctiva/sclera: Conjunctivae normal.      Pupils: Pupils are equal, round, and reactive to light.   Neck:      Vascular: No JVD.   Cardiovascular:      Rate and  Rhythm: Normal rate and regular rhythm.      Pulses: Intact distal pulses.      Heart sounds: Normal heart sounds. No murmur heard.    No friction rub. No gallop.   Pulmonary:      Effort: Pulmonary effort is normal. No respiratory distress.      Breath sounds: Normal breath sounds. No wheezing or rales.   Chest:      Chest wall: No tenderness.   Abdominal:      General: Bowel sounds are normal. There is no distension.      Palpations: Abdomen is soft.      Tenderness: There is no abdominal tenderness.   Musculoskeletal:         General: No tenderness or deformity. Normal range of motion.      Cervical back: Normal range of motion and neck supple.      Right lower leg: Edema (1+) present.      Left lower leg: Edema (1+) present.   Skin:     General: Skin is warm and dry.      Findings: No erythema or rash.   Neurological:      Mental Status: He is alert and oriented to person, place, and time.      Cranial Nerves: No cranial nerve deficit.      Motor: No abnormal muscle tone.      Coordination: Coordination normal.   Psychiatric:         Behavior: Behavior normal.         Thought Content: Thought content normal.         Judgment: Judgment normal.         Lab Results   Component Value Date    CHOL 229 (H) 01/06/2023    CHOL 202 (H) 12/09/2021    CHOL 204 (H) 05/18/2021     Lab Results   Component Value Date    HDL 50 01/06/2023    HDL 50 12/09/2021    HDL 52 05/18/2021     Lab Results   Component Value Date    LDLCALC 116.6 01/06/2023    LDLCALC 111.0 12/09/2021    LDLCALC 111.6 05/18/2021     Lab Results   Component Value Date    ALT 41 01/06/2023    AST 34 01/06/2023    AST 26 12/09/2021    AST 31 05/18/2021     Lab Results   Component Value Date    CREATININE 1.1 01/06/2023    BUN 17 01/06/2023     01/06/2023    K 4.9 01/06/2023    CO2 34 (H) 01/06/2023    CO2 30 (H) 12/09/2021    CO2 33 (H) 05/18/2021     Lab Results   Component Value Date    HGB 13.4 (L) 05/22/2020    HCT 44.1 05/22/2020    HCT 40.4  10/19/2019    HCT 46.2 10/18/2019                 Lab Results   Component Value Date    CHOL 229 (H) 01/06/2023    CHOL 202 (H) 12/09/2021    CHOL 204 (H) 05/18/2021     Lab Results   Component Value Date    HDL 50 01/06/2023    HDL 50 12/09/2021    HDL 52 05/18/2021     Lab Results   Component Value Date    LDLCALC 116.6 01/06/2023    LDLCALC 111.0 12/09/2021    LDLCALC 111.6 05/18/2021     Lab Results   Component Value Date    ALT 41 01/06/2023    AST 34 01/06/2023    AST 26 12/09/2021    AST 31 05/18/2021     Lab Results   Component Value Date    CREATININE 1.1 01/06/2023    BUN 17 01/06/2023     01/06/2023    K 4.9 01/06/2023    CO2 34 (H) 01/06/2023    CO2 30 (H) 12/09/2021    CO2 33 (H) 05/18/2021     Lab Results   Component Value Date    HGB 13.4 (L) 05/22/2020    HCT 44.1 05/22/2020    HCT 40.4 10/19/2019    HCT 46.2 10/18/2019                      Lab Results   Component Value Date     01/06/2023    K 4.9 01/06/2023     01/06/2023    CO2 34 (H) 01/06/2023    BUN 17 01/06/2023    CREATININE 1.1 01/06/2023     01/06/2023    HGBA1C 6.2 (H) 01/06/2023    AST 34 01/06/2023    ALT 41 01/06/2023    ALBUMIN 4.0 01/06/2023    PROT 7.3 01/06/2023    BILITOT 0.5 01/06/2023    WBC 8.47 05/22/2020    HGB 13.4 (L) 05/22/2020    HCT 44.1 05/22/2020     (H) 05/22/2020     05/22/2020    INR 1.0 08/10/2018    PSA 0.45 01/06/2023    TSH 2.028 02/15/2018    CHOL 229 (H) 01/06/2023    HDL 50 01/06/2023    LDLCALC 116.6 01/06/2023    LDLCALC 85 04/19/2017    TRIG 312 (H) 01/06/2023            Assessment and Plan:     Atherosclerosis of native coronary artery of native heart with unstable angina pectoris    Essential hypertension  -     IN OFFICE EKG 12-LEAD (to Muse)    Mixed hyperlipidemia  -     IN OFFICE EKG 12-LEAD (to Muse)    Severe obesity (BMI 35.0-39.9) with comorbidity  -     IN OFFICE EKG 12-LEAD (to Muse)    Prediabetes  -     IN OFFICE EKG 12-LEAD (to Muse)    SOB (shortness of  breath)       Patient comes in with chest pain and shortness of breath compatible with angina pectoris, of new onset.  This does coincide also though with discontinuation of hydrochlorothiazide.  He has noted increasing edema and weight since that time.  I would like to initially have him continue hydrochlorothiazide taking an extra dose tonight, I will add amlodipine for optimal blood pressure control.      Will plan for cardiac catheterization to define coronary anatomy and exclude critical coronary disease      No follow-ups on file.          No future appointments.

## 2023-03-06 ENCOUNTER — HOSPITAL ENCOUNTER (OUTPATIENT)
Facility: HOSPITAL | Age: 68
Discharge: HOME | End: 2023-03-06
Attending: INTERNAL MEDICINE | Admitting: INTERNAL MEDICINE
Payer: MEDICARE

## 2023-03-06 VITALS
WEIGHT: 265 LBS | RESPIRATION RATE: 18 BRPM | SYSTOLIC BLOOD PRESSURE: 151 MMHG | OXYGEN SATURATION: 96 % | TEMPERATURE: 98 F | DIASTOLIC BLOOD PRESSURE: 78 MMHG | HEART RATE: 54 BPM | BODY MASS INDEX: 35.12 KG/M2 | HEIGHT: 73 IN

## 2023-03-06 DIAGNOSIS — E78.2 MIXED HYPERLIPIDEMIA: ICD-10-CM

## 2023-03-06 DIAGNOSIS — I25.110 ATHEROSCLEROSIS OF NATIVE CORONARY ARTERY OF NATIVE HEART WITH UNSTABLE ANGINA PECTORIS: ICD-10-CM

## 2023-03-06 LAB
ANION GAP SERPL CALC-SCNC: 8 MMOL/L (ref 8–16)
BASOPHILS # BLD AUTO: 0.04 K/UL (ref 0–0.2)
BASOPHILS NFR BLD: 0.7 % (ref 0–1.9)
BUN SERPL-MCNC: 20 MG/DL (ref 8–23)
CALCIUM SERPL-MCNC: 9 MG/DL (ref 8.7–10.5)
CHLORIDE SERPL-SCNC: 103 MMOL/L (ref 95–110)
CO2 SERPL-SCNC: 29 MMOL/L (ref 23–29)
CREAT SERPL-MCNC: 0.9 MG/DL (ref 0.5–1.4)
DIFFERENTIAL METHOD: ABNORMAL
EOSINOPHIL # BLD AUTO: 0.1 K/UL (ref 0–0.5)
EOSINOPHIL NFR BLD: 2.6 % (ref 0–8)
ERYTHROCYTE [DISTWIDTH] IN BLOOD BY AUTOMATED COUNT: 13.1 % (ref 11.5–14.5)
EST. GFR  (NO RACE VARIABLE): >60 ML/MIN/1.73 M^2
GLUCOSE SERPL-MCNC: 101 MG/DL (ref 70–110)
HCT VFR BLD AUTO: 40.3 % (ref 40–54)
HGB BLD-MCNC: 13 G/DL (ref 14–18)
IMM GRANULOCYTES # BLD AUTO: 0.01 K/UL (ref 0–0.04)
IMM GRANULOCYTES NFR BLD AUTO: 0.2 % (ref 0–0.5)
LYMPHOCYTES # BLD AUTO: 1.7 K/UL (ref 1–4.8)
LYMPHOCYTES NFR BLD: 31.9 % (ref 18–48)
MCH RBC QN AUTO: 30.9 PG (ref 27–31)
MCHC RBC AUTO-ENTMCNC: 32.3 G/DL (ref 32–36)
MCV RBC AUTO: 96 FL (ref 82–98)
MONOCYTES # BLD AUTO: 0.7 K/UL (ref 0.3–1)
MONOCYTES NFR BLD: 12.3 % (ref 4–15)
NEUTROPHILS # BLD AUTO: 2.9 K/UL (ref 1.8–7.7)
NEUTROPHILS NFR BLD: 52.3 % (ref 38–73)
NRBC BLD-RTO: 0 /100 WBC
PLATELET # BLD AUTO: 201 K/UL (ref 150–450)
PMV BLD AUTO: 10.1 FL (ref 9.2–12.9)
POTASSIUM SERPL-SCNC: 4 MMOL/L (ref 3.5–5.1)
RBC # BLD AUTO: 4.21 M/UL (ref 4.6–6.2)
SODIUM SERPL-SCNC: 140 MMOL/L (ref 136–145)
WBC # BLD AUTO: 5.46 K/UL (ref 3.9–12.7)

## 2023-03-06 PROCEDURE — 85025 COMPLETE CBC W/AUTO DIFF WBC: CPT | Performed by: INTERNAL MEDICINE

## 2023-03-06 PROCEDURE — 25500020 PHARM REV CODE 255: Performed by: INTERNAL MEDICINE

## 2023-03-06 PROCEDURE — 93454 CORONARY ARTERY ANGIO S&I: CPT | Performed by: INTERNAL MEDICINE

## 2023-03-06 PROCEDURE — 63600175 PHARM REV CODE 636 W HCPCS: Performed by: INTERNAL MEDICINE

## 2023-03-06 PROCEDURE — 93454 CORONARY ARTERY ANGIO S&I: CPT | Mod: 26,GC,, | Performed by: INTERNAL MEDICINE

## 2023-03-06 PROCEDURE — 93454 PR CATH PLACE/CORONARY ANGIO, IMG SUPER/INTERP: ICD-10-PCS | Mod: 26,GC,, | Performed by: INTERNAL MEDICINE

## 2023-03-06 PROCEDURE — 80048 BASIC METABOLIC PNL TOTAL CA: CPT | Performed by: INTERNAL MEDICINE

## 2023-03-06 PROCEDURE — C1894 INTRO/SHEATH, NON-LASER: HCPCS | Performed by: INTERNAL MEDICINE

## 2023-03-06 PROCEDURE — C1887 CATHETER, GUIDING: HCPCS | Performed by: INTERNAL MEDICINE

## 2023-03-06 PROCEDURE — 25000003 PHARM REV CODE 250: Performed by: INTERNAL MEDICINE

## 2023-03-06 PROCEDURE — C1769 GUIDE WIRE: HCPCS | Performed by: INTERNAL MEDICINE

## 2023-03-06 PROCEDURE — 94761 N-INVAS EAR/PLS OXIMETRY MLT: CPT

## 2023-03-06 RX ORDER — HEPARIN SODIUM 1000 [USP'U]/ML
INJECTION, SOLUTION INTRAVENOUS; SUBCUTANEOUS
Status: DISCONTINUED | OUTPATIENT
Start: 2023-03-06 | End: 2023-03-06 | Stop reason: HOSPADM

## 2023-03-06 RX ORDER — ASPIRIN 81 MG/1
81 TABLET ORAL DAILY
Status: DISCONTINUED | OUTPATIENT
Start: 2023-03-06 | End: 2023-03-06 | Stop reason: HOSPADM

## 2023-03-06 RX ORDER — FENTANYL CITRATE 50 UG/ML
INJECTION, SOLUTION INTRAMUSCULAR; INTRAVENOUS
Status: DISCONTINUED | OUTPATIENT
Start: 2023-03-06 | End: 2023-03-06 | Stop reason: HOSPADM

## 2023-03-06 RX ORDER — HEPARIN SOD,PORCINE/0.9 % NACL 1000/500ML
INTRAVENOUS SOLUTION INTRAVENOUS
Status: DISCONTINUED | OUTPATIENT
Start: 2023-03-06 | End: 2023-03-06 | Stop reason: HOSPADM

## 2023-03-06 RX ORDER — SODIUM CHLORIDE 9 MG/ML
INJECTION, SOLUTION INTRAVENOUS CONTINUOUS
Status: ACTIVE | OUTPATIENT
Start: 2023-03-06 | End: 2023-03-06

## 2023-03-06 RX ORDER — SODIUM CHLORIDE 0.9 % (FLUSH) 0.9 %
2 SYRINGE (ML) INJECTION
Status: DISCONTINUED | OUTPATIENT
Start: 2023-03-06 | End: 2023-03-06 | Stop reason: HOSPADM

## 2023-03-06 RX ORDER — ATORVASTATIN CALCIUM 80 MG/1
80 TABLET, FILM COATED ORAL NIGHTLY
Qty: 90 TABLET | Refills: 3 | Status: SHIPPED | OUTPATIENT
Start: 2023-03-06 | End: 2024-01-29 | Stop reason: SDUPTHER

## 2023-03-06 RX ORDER — MIDAZOLAM HYDROCHLORIDE 1 MG/ML
INJECTION INTRAMUSCULAR; INTRAVENOUS
Status: DISCONTINUED | OUTPATIENT
Start: 2023-03-06 | End: 2023-03-06 | Stop reason: HOSPADM

## 2023-03-06 RX ORDER — LIDOCAINE HYDROCHLORIDE 10 MG/ML
INJECTION INFILTRATION; PERINEURAL
Status: DISCONTINUED | OUTPATIENT
Start: 2023-03-06 | End: 2023-03-06 | Stop reason: HOSPADM

## 2023-03-06 RX ADMIN — SODIUM CHLORIDE: 9 INJECTION, SOLUTION INTRAVENOUS at 06:03

## 2023-03-06 RX ADMIN — ASPIRIN 81 MG: 81 TABLET, COATED ORAL at 07:03

## 2023-03-06 NOTE — DISCHARGE INSTRUCTIONS
Discharge Instructions and Care of Your Wrist After a Cardiac Catheterization Procedure Performed via the Radial Artery    For 1 week following the procedure:  Do not subject your affected hand/arm to any forceful movements (i.e. supporting weight when rising from a chair or bed)  Avoid excessive (extension/flexion) wrist movement (i.e. supporting weight when rising from a chair or bed, push-ups, lifting garage doors, etc.)  Do not drive a car for 24 hours  The dressing on the puncture site may be removed after 24 hours and left open to air. If there is minor oozing, you may apply a Band-aid and remove after 12 hours  You may shower on the day following your procedure. Do not take a tub bath or submerge the puncture site in water for 3 days following the procedure  Do not lift anything heavier than 5 pounds with the affected hand/arm  Do not operate a lawnmower, motorcycle, chainsaw, or all-terrain vehicle   Do not engage in vigorous exercise (i.e. Tennis, Golf, Bowling) using the affected arm    If bleeding should occur following discharge:  Sit down and apply firm pressure to the puncture site with your fingers for 10 minutes   If the bleeding stops, continue to sit quietly, keeping your wrist straight for 2 hours. Notify your physician as soon as possible   If bleeding does not stop after 10 minutes or if there is a large amount of bleeding or spurting, call 911 immediately. Do not drive yourself to the hospital.     You should expect mild tingling in your hand and tenderness at the puncture site for up to 3 days.     Notify your physician if these symptoms persist or if you experience:  Change in color or temperature of the hand or arm  Redness, heat, or pus at the puncture site  Chills or fever greater than 101.0 F

## 2023-03-06 NOTE — DISCHARGE SUMMARY
Discharge Summary  Interventional Cardiology      Admit Date: 3/6/2023    Discharge Date:  3/6/2023    Attending Physician: Uche Walton Jr., *    Discharge Physician: Shun Bess MD     Principal Diagnoses: <principal problem not specified>  Indication for Admission: ANGIOGRAM, CORONARY ARTERY (N/A)    Discharged Condition: Good    Hospital Course:   Patient presented for outpatient coronary angiogram which went without complication. Coronary angiogram was nonobstructive. See full cath report in Epic for details. Hemostasis of patient's R Radial access site was achieved with VascBand. Patient was monitored per post-cath protocol, and his R radial access site was c/d/i with no hematoma. Patient was able to ambulate without difficulty. He was feeling well and anticipating discharge home today.     Outpatient Plan:  - There were no medication changes    Diet: Cardiac diet    Activity: Ad patricia, wound care instructions provided    Disposition: Home or Self Care    Follow Up:      Discharge Medications:      Medication List        CONTINUE taking these medications      amLODIPine 5 MG tablet  Commonly known as: NORVASC  Take 1 tablet (5 mg total) by mouth every evening.     aspirin 81 MG EC tablet  Commonly known as: ECOTRIN  Take 1 tablet (81 mg total) by mouth once daily.     atorvastatin 40 MG tablet  Commonly known as: LIPITOR  Take 1 tablet (40 mg total) by mouth every evening.     azelastine 137 mcg (0.1 %) nasal spray  Commonly known as: ASTELIN  INSTILL 1 SPRAY (137 MCG TOTAL) BY NASAL ROUTE 2 (TWO) TIMES DAILY.     hydroCHLOROthiazide 25 MG tablet  Commonly known as: HYDRODIURIL  Take 1 tablet (25 mg total) by mouth once daily.     lisinopriL 20 MG tablet  Commonly known as: PRINIVIL,ZESTRIL  Take 1 tablet (20 mg total) by mouth 2 (two) times a day.     metoprolol succinate 50 MG 24 hr tablet  Commonly known as: TOPROL-XL  Take 1 tablet (50 mg total) by mouth every evening.     nitroGLYCERIN 0.3 MG SL  tablet  Commonly known as: NITROSTAT  Place 1 tablet (0.3 mg total) under the tongue every 5 (five) minutes as needed for Chest pain.     pantoprazole 40 MG tablet  Commonly known as: PROTONIX  Take 1 tablet (40 mg total) by mouth once daily.     sertraline 100 MG tablet  Commonly known as: ZOLOFT  Take 1 tablet (100 mg total) by mouth once daily.     tadalafiL 20 MG Tab  Commonly known as: CIALIS

## 2023-03-06 NOTE — NURSING
Rt radial dressing clean, dry and intact.  No bleed or hematoma.  Discharge instructions and medlist given.  Patient and spouse verbalized understanding.  Denies need for escort or wheelchair off unit.  Off unit walking with spouse who will drive him home.

## 2023-03-06 NOTE — INTERVAL H&P NOTE
The patient has been examined and the H&P has been reviewed:    I concur with the findings and no changes have occurred since H&P was written.       Assessment and Plan:      Atherosclerosis of native coronary artery of native heart with unstable angina pectoris    -- +/- PCI, patient is a WHITLEY candidate  - Anti-platelet Therapy: aspirin   - Access: Radial  - Access closure: TBD  - Creatinine/CrCl:   CrCl cannot be calculated (Patient's most recent lab result is older than the maximum 7 days allowed.).  - Allergies:   - No shellfish / Iodine allergy  - No Latex allergy   - No Aspirin allergy    - No history of HIT  - Pre-Hydration: NS 3cc/kg x 1 hour   - Pre-Op Med: Bendaryl 50mg pO     - All patient's questions were answered.  -The risks, benefits and alternatives of the procedure were explained to the patient.   -The risks of coronary angiography include but are not limited to: bleeding, infection, heart rhythm abnormalities, allergic reactions, kidney injury and potential need for dialysis, stroke and death.   - Should stenting be indicated, the patient has agreed to dual anti-platelet therapy for 1-consecutive year with a drug-eluting stent and a minimum of 1-month with the use of a bare metal stent  - Additionally, pt is aware that non-compliance is likely to result in stent clotting with heart attack, heart failure, and/or death  -The risks of moderate sedation include hypotension, respiratory depression, arrhythmias, bronchospasm, and death.   - Informed consent was obtained and the  patient is agreeable to proceed with the procedure.    Signed:  John Perez MD  Cardiovascular Fellow  [facility]

## 2023-03-06 NOTE — BRIEF OP NOTE
Brief Operative Note:    : Uche Walton Jr., MD     Referring Physician: Uche Walton Jr.     All Operators: Surgeon(s):  MD Shun Hare Jr., MD     Preoperative Diagnosis: Atherosclerosis of native coronary artery of native heart with unstable angina pectoris [I25.110]     Postop Diagnosis: Atherosclerosis of native coronary artery of native heart with unstable angina pectoris [I25.110]    Treatments/Procedures: Procedure(s) (LRB):  ANGIOGRAM, CORONARY ARTERY (N/A)    Access: Right radial artery    Findings:Mild coronary artery disease is present.     See catheterization report for full details.    Intervention: non intervention, non-obstructive coronary artery disease     See catheterization report for full details.    Closure device:  TR band         Plan:  - Post cath protocol   - IVF @ 250 cc/kg/hr x 2 hours  - Bed rest x 2 hours   - Continue aspirin 81 mg daily indefinitely  - Continue high intensity statin therapy (LDL goal < 70)  - Risk factor reduction (BP <130/80 mmHg, glycemic control, etc)  - Cardiac rehab referral  - Follow up with Dr Walton     Estimated Blood loss: 20 cc    Specimens removed: No    Shun Bess MD

## 2023-03-06 NOTE — PLAN OF CARE
Received report from MARY LOU Hutton. Patient s/p [facility], AAOx3. VSS, no c/o pain or discomfort at this time, resp even and unlabored. Vasc band to R wrist is CDI. No active bleeding. No hematoma noted. Post procedure protocol reviewed with patient and patient's family. Understanding verbalized. Family members at bedside. Nurse call bell within reach. Will continue to monitor per post procedure protocol.

## 2023-03-22 ENCOUNTER — OFFICE VISIT (OUTPATIENT)
Dept: CARDIOLOGY | Facility: CLINIC | Age: 68
End: 2023-03-22
Payer: MEDICARE

## 2023-03-22 VITALS
HEART RATE: 60 BPM | BODY MASS INDEX: 34.46 KG/M2 | SYSTOLIC BLOOD PRESSURE: 151 MMHG | HEIGHT: 73 IN | DIASTOLIC BLOOD PRESSURE: 86 MMHG | RESPIRATION RATE: 20 BRPM | WEIGHT: 260 LBS

## 2023-03-22 DIAGNOSIS — I25.10 CORONARY ARTERY DISEASE INVOLVING NATIVE CORONARY ARTERY OF NATIVE HEART WITHOUT ANGINA PECTORIS: Primary | ICD-10-CM

## 2023-03-22 DIAGNOSIS — R73.03 PREDIABETES: ICD-10-CM

## 2023-03-22 DIAGNOSIS — I10 ESSENTIAL HYPERTENSION: ICD-10-CM

## 2023-03-22 DIAGNOSIS — E78.2 MIXED HYPERLIPIDEMIA: ICD-10-CM

## 2023-03-22 DIAGNOSIS — E66.01 SEVERE OBESITY (BMI 35.0-39.9) WITH COMORBIDITY: ICD-10-CM

## 2023-03-22 PROCEDURE — 99214 OFFICE O/P EST MOD 30 MIN: CPT | Mod: PBBFAC | Performed by: INTERNAL MEDICINE

## 2023-03-22 PROCEDURE — 99999 PR PBB SHADOW E&M-EST. PATIENT-LVL IV: ICD-10-PCS | Mod: PBBFAC,,, | Performed by: INTERNAL MEDICINE

## 2023-03-22 PROCEDURE — 99214 PR OFFICE/OUTPT VISIT, EST, LEVL IV, 30-39 MIN: ICD-10-PCS | Mod: S$PBB,,, | Performed by: INTERNAL MEDICINE

## 2023-03-22 PROCEDURE — 99999 PR PBB SHADOW E&M-EST. PATIENT-LVL IV: CPT | Mod: PBBFAC,,, | Performed by: INTERNAL MEDICINE

## 2023-03-22 PROCEDURE — 99214 OFFICE O/P EST MOD 30 MIN: CPT | Mod: S$PBB,,, | Performed by: INTERNAL MEDICINE

## 2023-03-22 NOTE — PROGRESS NOTES
Subjective:   03/22/2023     Patient ID:  Nilesh Sanchez is a 67 y.o. male who presents for evaulation of Atherosclerosis of native coronary artery of native heart w      Comes in for follow-up.  Working on diet, discussed, see after visit summary.  Has coronary artery disease, but currently asymptomatic.    Blood pressure very well controlled at home, 120/70.      Hypercholesterolemia on now high-dose statin therapy, has high triglycerides, high LDL.  LDL goal less than 70 mg/dL, close to 50.  Triglycerides should be reduced with diet and exercise.      Recent cardiac catheterization:   Coronary Findings  Diagnostic  Dominance: Right  Left Main  Normal length, no significant stenosis, luminal irregularity noted  Left Anterior Descending  The LAD gives origin to a moderate-sized 1st diagonal branch, luminal irregularity is noted in that vessel. The LAD then has an area of 60% stenosis which is fairly long. The distal LAD gives origin to a small diagonal branch and at the apex small recurrent lateral and inferior branches.  Ramus Intermedius  Absent  Left Circumflex  Generalized luminal irregularity noted. Gives origin to a large 1st marginal branch and a small posterolateral branch.  Right Coronary Artery  The right coronary artery is large giving origin to a large PDA, small moderate and to further small right posterolateral branches. Luminal irregularity is present.    No interventions have been documented.    Recommendations    · Routine post-cath care.  · Maximize medical management.  · ASA 81mg.  · Weight loss.  · Statin therapy.      Patient had initially presented with increasing shortness of breath and chest pain, hydrochlorothiazide had been discontinued, he appeared to be hypertensive and volume overloaded.  He feels better now with resumption of hydrochlorothiazide therapy along with therapy with beta-blocker and calcium channel blockers.    He will be continued on medical management, hypertension be  controlled with beta-blocker, ACE inhibitor, hydrochlorothiazide and calcium channel blocker.      High-dose statin therapy should be continued, LDL goal less than 70, approximately 50.  Atorvastatin will be increased to 80 mg nightly.      Prior note:    Comes in today for evaluation of increasing shortness of breath and chest discomfort.  Yesterday, he had chest tightness lasting 3 hours with associated shortness of breath.  Shortness of breath is worse with activity and he also had exertional chest pain.  That has been present for last 48 hours.  He is also had fairly severe hypertension over the last several weeks with medication changes.  He was switched from lisinopril HCT to valsartan, but became dizzy.  That was discontinued and he was restarted on lisinopril at a higher dose, 20 mg twice a day, but he did not take the hydrochlorothiazide in his been off of hydrochlorothiazide last 2 weeks.  He only resume that yesterday and took another dose today.    He does not have a history of coronary artery disease, cardiac catheterization 20 years ago did not show severe blockages.  Always has an abnormal EKG suggestive of anterior wall infarct, this has never been present though.    Hypertension is present and continues, his blood pressure today lying is 172/94, standing 159/94, pulse 58.  Other antihypertensive medications include metoprolol.      Hyper lipidemia is present.  Recently, he was found have a total cholesterol 229, HDL 50, , triglycerides 312.      He does have prediabetes.      Past Medical History:   Diagnosis Date    Allergic rhinosinusitis     Anxiety     Back pain     with spasms    Diverticulitis     s/p partial colectomy    GERD (gastroesophageal reflux disease)     Hx of colonic polyps        Review of patient's allergies indicates:  No Known Allergies      Current Outpatient Medications:     amLODIPine (NORVASC) 5 MG tablet, Take 1 tablet (5 mg total) by mouth every evening., Disp: 30  tablet, Rfl: 11    aspirin (ECOTRIN) 81 MG EC tablet, Take 1 tablet (81 mg total) by mouth once daily., Disp: , Rfl: 0    atorvastatin (LIPITOR) 80 MG tablet, Take 1 tablet (80 mg total) by mouth every evening., Disp: 90 tablet, Rfl: 3    azelastine (ASTELIN) 137 mcg (0.1 %) nasal spray, INSTILL 1 SPRAY (137 MCG TOTAL) BY NASAL ROUTE 2 (TWO) TIMES DAILY., Disp: 90 mL, Rfl: 0    hydroCHLOROthiazide (HYDRODIURIL) 25 MG tablet, Take 1 tablet (25 mg total) by mouth once daily., Disp: 90 tablet, Rfl: 1    lisinopriL (PRINIVIL,ZESTRIL) 20 MG tablet, Take 1 tablet (20 mg total) by mouth 2 (two) times a day., Disp: 180 tablet, Rfl: 0    metoprolol succinate (TOPROL-XL) 50 MG 24 hr tablet, Take 1 tablet (50 mg total) by mouth every evening., Disp: 90 tablet, Rfl: 0    nitroGLYCERIN (NITROSTAT) 0.3 MG SL tablet, Place 1 tablet (0.3 mg total) under the tongue every 5 (five) minutes as needed for Chest pain., Disp: 100 tablet, Rfl: 3    pantoprazole (PROTONIX) 40 MG tablet, Take 1 tablet (40 mg total) by mouth once daily., Disp: 90 tablet, Rfl: 0    sertraline (ZOLOFT) 100 MG tablet, Take 1 tablet (100 mg total) by mouth once daily., Disp: 90 tablet, Rfl: 0    tadalafiL (CIALIS) 20 MG Tab, Take 20 mg by mouth., Disp: , Rfl:      Objective:   Review of Systems   Cardiovascular:  Negative for chest pain, claudication, cyanosis, dyspnea on exertion, irregular heartbeat, leg swelling, near-syncope, orthopnea, palpitations, paroxysmal nocturnal dyspnea and syncope.       Vitals:    03/22/23 0916   BP: (!) 151/86   Pulse: 60   Resp: 20       Wt Readings from Last 3 Encounters:   03/22/23 117.9 kg (260 lb)   03/06/23 120.2 kg (265 lb)   03/03/23 123.6 kg (272 lb 7.8 oz)     Temp Readings from Last 3 Encounters:   03/06/23 98 °F (36.7 °C) (Temporal)   09/06/22 97.3 °F (36.3 °C)   08/24/22 97.3 °F (36.3 °C)     BP Readings from Last 3 Encounters:   03/22/23 (!) 151/86   03/06/23 (!) 151/78   03/03/23 (!) 184/99     Pulse Readings from  Last 3 Encounters:   03/22/23 60   03/06/23 (!) 54   03/03/23 (!) 57             Physical Exam      Lab Results   Component Value Date    CHOL 229 (H) 01/06/2023    CHOL 202 (H) 12/09/2021    CHOL 204 (H) 05/18/2021     Lab Results   Component Value Date    HDL 50 01/06/2023    HDL 50 12/09/2021    HDL 52 05/18/2021     Lab Results   Component Value Date    LDLCALC 116.6 01/06/2023    LDLCALC 111.0 12/09/2021    LDLCALC 111.6 05/18/2021     Lab Results   Component Value Date    ALT 41 01/06/2023    AST 34 01/06/2023    AST 26 12/09/2021    AST 31 05/18/2021     Lab Results   Component Value Date    CREATININE 0.9 03/06/2023    BUN 20 03/06/2023     03/06/2023    K 4.0 03/06/2023    CO2 29 03/06/2023    CO2 34 (H) 01/06/2023    CO2 30 (H) 12/09/2021     Lab Results   Component Value Date    HGB 13.0 (L) 03/06/2023    HCT 40.3 03/06/2023    HCT 44.1 05/22/2020    HCT 40.4 10/19/2019                 Lab Results   Component Value Date    CHOL 229 (H) 01/06/2023    CHOL 202 (H) 12/09/2021    CHOL 204 (H) 05/18/2021     Lab Results   Component Value Date    HDL 50 01/06/2023    HDL 50 12/09/2021    HDL 52 05/18/2021     Lab Results   Component Value Date    LDLCALC 116.6 01/06/2023    LDLCALC 111.0 12/09/2021    LDLCALC 111.6 05/18/2021     Lab Results   Component Value Date    ALT 41 01/06/2023    AST 34 01/06/2023    AST 26 12/09/2021    AST 31 05/18/2021     Lab Results   Component Value Date    CREATININE 0.9 03/06/2023    BUN 20 03/06/2023     03/06/2023    K 4.0 03/06/2023    CO2 29 03/06/2023    CO2 34 (H) 01/06/2023    CO2 30 (H) 12/09/2021     Lab Results   Component Value Date    HGB 13.0 (L) 03/06/2023    HCT 40.3 03/06/2023    HCT 44.1 05/22/2020    HCT 40.4 10/19/2019                      Lab Results   Component Value Date     03/06/2023    K 4.0 03/06/2023     03/06/2023    CO2 29 03/06/2023    BUN 20 03/06/2023    CREATININE 0.9 03/06/2023     03/06/2023    HGBA1C 6.2 (H)  01/06/2023    AST 34 01/06/2023    ALT 41 01/06/2023    ALBUMIN 4.0 01/06/2023    PROT 7.3 01/06/2023    BILITOT 0.5 01/06/2023    WBC 5.46 03/06/2023    HGB 13.0 (L) 03/06/2023    HCT 40.3 03/06/2023    MCV 96 03/06/2023     03/06/2023    INR 1.0 08/10/2018    PSA 0.45 01/06/2023    TSH 2.028 02/15/2018    CHOL 229 (H) 01/06/2023    HDL 50 01/06/2023    LDLCALC 116.6 01/06/2023    LDLCALC 85 04/19/2017    TRIG 312 (H) 01/06/2023            Assessment and Plan:     Coronary artery disease involving native coronary artery of native heart without angina pectoris  Comments:  Currently asymptomatic    Essential hypertension  Comments:  Well controlled at home    Mixed hyperlipidemia  Comments:  Will need repeat lipids in several months, LDL goal less than 70    Severe obesity (BMI 35.0-39.9) with comorbidity    Prediabetes               Follow up in about 6 months (around 9/22/2023).          No future appointments.

## 2023-04-06 ENCOUNTER — PATIENT MESSAGE (OUTPATIENT)
Dept: UROLOGY | Facility: CLINIC | Age: 68
End: 2023-04-06
Payer: MEDICARE

## 2023-05-23 ENCOUNTER — PATIENT MESSAGE (OUTPATIENT)
Dept: FAMILY MEDICINE | Facility: CLINIC | Age: 68
End: 2023-05-23
Payer: MEDICARE

## 2023-05-23 DIAGNOSIS — Z01.89 ENCOUNTER FOR LABORATORY EXAMINATION: ICD-10-CM

## 2023-05-23 DIAGNOSIS — E78.2 MIXED HYPERLIPIDEMIA: Primary | ICD-10-CM

## 2023-05-23 DIAGNOSIS — R73.03 PREDIABETES: ICD-10-CM

## 2023-05-23 DIAGNOSIS — E66.01 SEVERE OBESITY (BMI 35.0-39.9) WITH COMORBIDITY: ICD-10-CM

## 2023-05-23 DIAGNOSIS — Z13.29 SCREENING FOR THYROID DISORDER: ICD-10-CM

## 2023-05-29 DIAGNOSIS — I10 ESSENTIAL HYPERTENSION: ICD-10-CM

## 2023-05-29 NOTE — TELEPHONE ENCOUNTER
No care due was identified.  [facility] Embedded Care Due Messages. Reference number: [id number].   5/29/2023 1:56:59 PM CDT

## 2023-05-30 RX ORDER — LISINOPRIL 20 MG/1
TABLET ORAL
Qty: 60 TABLET | Refills: 0 | Status: SHIPPED | OUTPATIENT
Start: 2023-05-30 | End: 2023-07-05

## 2023-05-30 NOTE — TELEPHONE ENCOUNTER
Refill Routing Note   Medication(s) are not appropriate for processing by [facility] for the following reason(s):      Required vitals abnormal    [facility] action(s):  Defer None identified            Appointments  past 12m or future 3m with PCP    Date Provider   Last Visit   1/12/2023 Flavia Lange MD   Next Visit   6/13/2023 Flavia Lange MD   ED visits in past 90 days: 0        Note composed:4:33 AM 05/30/2023

## 2023-06-09 ENCOUNTER — LAB VISIT (OUTPATIENT)
Dept: LAB | Facility: HOSPITAL | Age: 68
End: 2023-06-09
Attending: INTERNAL MEDICINE
Payer: MEDICARE

## 2023-06-09 DIAGNOSIS — R73.03 PREDIABETES: ICD-10-CM

## 2023-06-09 DIAGNOSIS — E66.01 SEVERE OBESITY (BMI 35.0-39.9) WITH COMORBIDITY: ICD-10-CM

## 2023-06-09 DIAGNOSIS — Z01.89 ENCOUNTER FOR LABORATORY EXAMINATION: ICD-10-CM

## 2023-06-09 LAB
ALBUMIN SERPL BCP-MCNC: 3.8 G/DL (ref 3.5–5.2)
ALP SERPL-CCNC: 98 U/L (ref 55–135)
ALT SERPL W/O P-5'-P-CCNC: 28 U/L (ref 10–44)
ANION GAP SERPL CALC-SCNC: 11 MMOL/L (ref 8–16)
AST SERPL-CCNC: 33 U/L (ref 10–40)
BILIRUB SERPL-MCNC: 0.4 MG/DL (ref 0.1–1)
BUN SERPL-MCNC: 23 MG/DL (ref 8–23)
CALCIUM SERPL-MCNC: 9.9 MG/DL (ref 8.7–10.5)
CHLORIDE SERPL-SCNC: 103 MMOL/L (ref 95–110)
CO2 SERPL-SCNC: 31 MMOL/L (ref 23–29)
CREAT SERPL-MCNC: 1 MG/DL (ref 0.5–1.4)
EST. GFR  (NO RACE VARIABLE): >60 ML/MIN/1.73 M^2
ESTIMATED AVG GLUCOSE: 114 MG/DL (ref 68–131)
GLUCOSE SERPL-MCNC: 101 MG/DL (ref 70–110)
HBA1C MFR BLD: 5.6 % (ref 4–5.6)
POTASSIUM SERPL-SCNC: 5.3 MMOL/L (ref 3.5–5.1)
PROT SERPL-MCNC: 7.1 G/DL (ref 6–8.4)
SODIUM SERPL-SCNC: 145 MMOL/L (ref 136–145)

## 2023-06-09 PROCEDURE — 80053 COMPREHEN METABOLIC PANEL: CPT | Performed by: INTERNAL MEDICINE

## 2023-06-09 PROCEDURE — 83036 HEMOGLOBIN GLYCOSYLATED A1C: CPT | Performed by: INTERNAL MEDICINE

## 2023-06-09 PROCEDURE — 36415 COLL VENOUS BLD VENIPUNCTURE: CPT | Mod: PN | Performed by: INTERNAL MEDICINE

## 2023-06-12 ENCOUNTER — PATIENT MESSAGE (OUTPATIENT)
Dept: FAMILY MEDICINE | Facility: CLINIC | Age: 68
End: 2023-06-12
Payer: MEDICARE

## 2023-06-13 ENCOUNTER — OFFICE VISIT (OUTPATIENT)
Dept: FAMILY MEDICINE | Facility: CLINIC | Age: 68
End: 2023-06-13
Payer: MEDICARE

## 2023-06-13 VITALS
BODY MASS INDEX: 33.27 KG/M2 | WEIGHT: 251 LBS | HEIGHT: 73 IN | RESPIRATION RATE: 16 BRPM | DIASTOLIC BLOOD PRESSURE: 70 MMHG | SYSTOLIC BLOOD PRESSURE: 130 MMHG | HEART RATE: 64 BPM

## 2023-06-13 DIAGNOSIS — I10 ESSENTIAL HYPERTENSION: ICD-10-CM

## 2023-06-13 DIAGNOSIS — E87.5 HYPERKALEMIA: ICD-10-CM

## 2023-06-13 DIAGNOSIS — R60.0 LEG EDEMA: ICD-10-CM

## 2023-06-13 DIAGNOSIS — E78.2 MIXED HYPERLIPIDEMIA: Primary | ICD-10-CM

## 2023-06-13 DIAGNOSIS — I25.10 CORONARY ARTERY DISEASE INVOLVING NATIVE CORONARY ARTERY OF NATIVE HEART WITHOUT ANGINA PECTORIS: ICD-10-CM

## 2023-06-13 PROCEDURE — 99213 OFFICE O/P EST LOW 20 MIN: CPT | Mod: PBBFAC,PN | Performed by: INTERNAL MEDICINE

## 2023-06-13 PROCEDURE — 99999 PR PBB SHADOW E&M-EST. PATIENT-LVL III: CPT | Mod: PBBFAC,,, | Performed by: INTERNAL MEDICINE

## 2023-06-13 PROCEDURE — 99214 PR OFFICE/OUTPT VISIT, EST, LEVL IV, 30-39 MIN: ICD-10-PCS | Mod: S$PBB,,, | Performed by: INTERNAL MEDICINE

## 2023-06-13 PROCEDURE — 99999 PR PBB SHADOW E&M-EST. PATIENT-LVL III: ICD-10-PCS | Mod: PBBFAC,,, | Performed by: INTERNAL MEDICINE

## 2023-06-13 PROCEDURE — 99214 OFFICE O/P EST MOD 30 MIN: CPT | Mod: S$PBB,,, | Performed by: INTERNAL MEDICINE

## 2023-06-13 RX ORDER — AMLODIPINE BESYLATE 5 MG/1
5 TABLET ORAL NIGHTLY
Qty: 90 TABLET | Refills: 1 | Status: SHIPPED | OUTPATIENT
Start: 2023-06-13 | End: 2023-10-08 | Stop reason: SDUPTHER

## 2023-06-13 RX ORDER — TADALAFIL 20 MG/1
20 TABLET ORAL DAILY
COMMUNITY
End: 2023-07-24 | Stop reason: SDUPTHER

## 2023-06-13 NOTE — PROGRESS NOTES
Assessment and Plan:    1. Mixed hyperlipidemia  Atorvastatin dose increased.   - Lipid Panel; Future    2. Coronary artery disease involving native coronary artery of native heart without angina pectoris  - Lipid Panel; Future  With edema, CAD, and uncontrolled HTN will obtain echo to assess systolic and diastolic function  - amLODIPine (NORVASC) 5 MG tablet; Take 1 tablet (5 mg total) by mouth every evening.  Dispense: 90 tablet; Refill: 1  - Echo; Future    3. Essential hypertension  - amLODIPine (NORVASC) 5 MG tablet; Take 1 tablet (5 mg total) by mouth every evening.  Dispense: 90 tablet; Refill: 1    4. Leg edema  - Echo; Future    5. Hyperkalemia  - Basic Metabolic Panel; Future    ______________________________________________________________________  Subjective:    Chief Complaint:  Follow up chronic medical conditions.    HPI:  Nilesh is a 68 y.o. year old male here to follow up chronic medical conditions.     HTN- In Jan at the last visit with me, BP was not well controlled and I had changed lisinopril to valsartan. A few weeks after this he had ended up in the ER for hypertensive emergency, but left without being seen. He subsequently had messaged me and had been confused about which medications he was supposed to be taking, had not been taking the HCTZ. He saw his Cardiologist in March. Started amlodipine and did a cardiac cath. Cath showed CAD, nothing requiring stenting. Most readings recently have been controlled, but still some readings up in the 150s systolic.    He has made significant changes to his diet since all of this happened, mainly has been substantially reducing carbs. He has lost 22 lbs.     He has been wearing compression stockings for venous reflux. Taking HCTZ.     HLD- Takes atorvastatin, dose recently increased by Cardiologist.      PreDM- Recent A1c down to 5.6 from 6.2.     Chronic anxiety- Takes sertraline and doing well with this.      GERD- Taking PPI and not having symptoms at  this time.     Medications:  Current Outpatient Medications on File Prior to Visit   Medication Sig Dispense Refill    amLODIPine (NORVASC) 5 MG tablet Take 1 tablet (5 mg total) by mouth every evening. 30 tablet 11    aspirin (ECOTRIN) 81 MG EC tablet Take 1 tablet (81 mg total) by mouth once daily.  0    atorvastatin (LIPITOR) 80 MG tablet Take 1 tablet (80 mg total) by mouth every evening. 90 tablet 3    azelastine (ASTELIN) 137 mcg (0.1 %) nasal spray INSTILL 1 SPRAY (137 MCG TOTAL) BY NASAL ROUTE 2 (TWO) TIMES DAILY. 90 mL 0    hydroCHLOROthiazide (HYDRODIURIL) 25 MG tablet Take 1 tablet (25 mg total) by mouth once daily. 90 tablet 1    lisinopriL (PRINIVIL,ZESTRIL) 20 MG tablet TAKE 1 TABLET BY MOUTH TWICE A DAY 60 tablet 0    metoprolol succinate (TOPROL-XL) 50 MG 24 hr tablet TAKE 1 TABLET EVERY EVENING 90 tablet 0    nitroGLYCERIN (NITROSTAT) 0.3 MG SL tablet Place 1 tablet (0.3 mg total) under the tongue every 5 (five) minutes as needed for Chest pain. 100 tablet 3    pantoprazole (PROTONIX) 40 MG tablet TAKE 1 TABLET ONCE DAILY 90 tablet 3    sertraline (ZOLOFT) 100 MG tablet TAKE 1 TABLET ONCE DAILY 90 tablet 3    tadalafiL (CIALIS) 20 MG Tab Take 20 mg by mouth once daily.      [DISCONTINUED] tadalafiL (CIALIS) 20 MG Tab Take 20 mg by mouth.       No current facility-administered medications on file prior to visit.       Review of Systems:  Review of Systems   Constitutional:  Negative for activity change and unexpected weight change.   HENT:  Negative for hearing loss, rhinorrhea and trouble swallowing.    Eyes:  Negative for discharge and visual disturbance.   Respiratory:  Negative for chest tightness and wheezing.    Cardiovascular:  Positive for leg swelling (minimized with compression stockings). Negative for chest pain and palpitations.   Gastrointestinal:  Negative for blood in stool, constipation, diarrhea and vomiting.   Endocrine: Negative for polydipsia and polyuria.   Genitourinary:   "Negative for difficulty urinating, hematuria and urgency.   Musculoskeletal:  Negative for arthralgias, joint swelling and neck pain.   Neurological:  Negative for weakness and headaches.   Psychiatric/Behavioral:  Negative for confusion and dysphoric mood.      Past Medical History:  Past Medical History:   Diagnosis Date    Allergic rhinosinusitis     Anxiety     Back pain     with spasms    Diverticulitis     s/p partial colectomy    GERD (gastroesophageal reflux disease)     Hx of colonic polyps        Objective:    Vitals:  Vitals:    06/13/23 1501   BP: 130/70   Pulse: 64   Resp: 16   Weight: 113.9 kg (250 lb 15.9 oz)   Height: 6' 1" (1.854 m)       Physical Exam  Vitals reviewed.   Constitutional:       General: He is not in acute distress.     Appearance: He is well-developed.   Eyes:      Conjunctiva/sclera: Conjunctivae normal.   Cardiovascular:      Rate and Rhythm: Normal rate and regular rhythm.   Pulmonary:      Effort: Pulmonary effort is normal. No respiratory distress.   Skin:     General: Skin is warm and dry.   Neurological:      Mental Status: He is alert and oriented to person, place, and time.   Psychiatric:         Mood and Affect: Mood normal.         Behavior: Behavior normal.       Data:  K 5.3      Flavia Lange MD  Internal Medicine    "

## 2023-06-14 ENCOUNTER — LAB VISIT (OUTPATIENT)
Dept: LAB | Facility: HOSPITAL | Age: 68
End: 2023-06-14
Attending: INTERNAL MEDICINE
Payer: MEDICARE

## 2023-06-14 DIAGNOSIS — I25.10 CORONARY ARTERY DISEASE INVOLVING NATIVE CORONARY ARTERY OF NATIVE HEART WITHOUT ANGINA PECTORIS: ICD-10-CM

## 2023-06-14 DIAGNOSIS — E78.2 MIXED HYPERLIPIDEMIA: ICD-10-CM

## 2023-06-14 DIAGNOSIS — E87.5 HYPERKALEMIA: ICD-10-CM

## 2023-06-14 LAB
ANION GAP SERPL CALC-SCNC: 11 MMOL/L (ref 8–16)
BUN SERPL-MCNC: 36 MG/DL (ref 8–23)
CALCIUM SERPL-MCNC: 9.9 MG/DL (ref 8.7–10.5)
CHLORIDE SERPL-SCNC: 100 MMOL/L (ref 95–110)
CHOLEST SERPL-MCNC: 205 MG/DL (ref 120–199)
CHOLEST/HDLC SERPL: 4 {RATIO} (ref 2–5)
CO2 SERPL-SCNC: 28 MMOL/L (ref 23–29)
CREAT SERPL-MCNC: 1.3 MG/DL (ref 0.5–1.4)
EST. GFR  (NO RACE VARIABLE): 59.1 ML/MIN/1.73 M^2
GLUCOSE SERPL-MCNC: 92 MG/DL (ref 70–110)
HDLC SERPL-MCNC: 51 MG/DL (ref 40–75)
HDLC SERPL: 24.9 % (ref 20–50)
LDLC SERPL CALC-MCNC: 99.6 MG/DL (ref 63–159)
NONHDLC SERPL-MCNC: 154 MG/DL
POTASSIUM SERPL-SCNC: 4.2 MMOL/L (ref 3.5–5.1)
SODIUM SERPL-SCNC: 139 MMOL/L (ref 136–145)
TRIGL SERPL-MCNC: 272 MG/DL (ref 30–150)

## 2023-06-14 PROCEDURE — 80061 LIPID PANEL: CPT | Performed by: INTERNAL MEDICINE

## 2023-06-14 PROCEDURE — 80048 BASIC METABOLIC PNL TOTAL CA: CPT | Performed by: INTERNAL MEDICINE

## 2023-06-14 PROCEDURE — 36415 COLL VENOUS BLD VENIPUNCTURE: CPT | Mod: PN | Performed by: INTERNAL MEDICINE

## 2023-06-20 ENCOUNTER — PATIENT MESSAGE (OUTPATIENT)
Dept: FAMILY MEDICINE | Facility: CLINIC | Age: 68
End: 2023-06-20
Payer: MEDICARE

## 2023-06-27 ENCOUNTER — PATIENT MESSAGE (OUTPATIENT)
Dept: ADMINISTRATIVE | Facility: OTHER | Age: 68
End: 2023-06-27
Payer: MEDICARE

## 2023-07-05 DIAGNOSIS — I10 ESSENTIAL HYPERTENSION: ICD-10-CM

## 2023-07-05 RX ORDER — LISINOPRIL 20 MG/1
TABLET ORAL
Qty: 180 TABLET | Refills: 3 | Status: SHIPPED | OUTPATIENT
Start: 2023-07-05 | End: 2023-10-08 | Stop reason: SDUPTHER

## 2023-07-05 NOTE — TELEPHONE ENCOUNTER
No care due was identified.  [facility] Embedded Care Due Messages. Reference number: [id number].   7/05/2023 10:33:36 AM CDT

## 2023-07-05 NOTE — TELEPHONE ENCOUNTER
Refill Routing Note   Medication(s) are not appropriate for processing by [facility] for the following reason(s):      Drug-disease interaction    [facility] action(s):  Defer None identified   Medication Therapy Plan: Drug-Disease: lisinopriL and Hyperkalemia; DEFER    Pharmacist review requested: Yes     Appointments  past 12m or future 3m with PCP    Date Provider   Last Visit   6/13/2023 Flavia Lange MD   Next Visit   Visit date not found Flavia Lange MD   ED visits in past 90 days: 0        Note composed:2:52 PM 07/05/2023

## 2023-07-05 NOTE — TELEPHONE ENCOUNTER
Refill Decision Note   Nilesh Sanchez  is requesting a refill authorization.  Brief Assessment and Rationale for Refill:  Approve     Medication Therapy Plan:         Alert overridden per protocol: Yes   Pharmacist review requested: Yes   Comments:     No Care Gaps recommended.     Note composed:3:18 PM 07/05/2023

## 2023-07-13 DIAGNOSIS — I10 ESSENTIAL HYPERTENSION: ICD-10-CM

## 2023-07-14 ENCOUNTER — HOSPITAL ENCOUNTER (OUTPATIENT)
Dept: CARDIOLOGY | Facility: HOSPITAL | Age: 68
Discharge: HOME | End: 2023-07-14
Attending: INTERNAL MEDICINE
Payer: MEDICARE

## 2023-07-14 VITALS
BODY MASS INDEX: 33.13 KG/M2 | HEART RATE: 67 BPM | SYSTOLIC BLOOD PRESSURE: 163 MMHG | HEIGHT: 73 IN | WEIGHT: 250 LBS | DIASTOLIC BLOOD PRESSURE: 76 MMHG

## 2023-07-14 DIAGNOSIS — R60.0 LEG EDEMA: ICD-10-CM

## 2023-07-14 DIAGNOSIS — I25.10 CORONARY ARTERY DISEASE INVOLVING NATIVE CORONARY ARTERY OF NATIVE HEART WITHOUT ANGINA PECTORIS: ICD-10-CM

## 2023-07-14 LAB
ASCENDING AORTA: 3.43 CM
AV INDEX (PROSTH): 0.89
AV MEAN GRADIENT: 4 MMHG
AV PEAK GRADIENT: 8 MMHG
AV VALVE AREA: 3.7 CM2
AV VELOCITY RATIO: 0.94
BSA FOR ECHO PROCEDURE: 2.42 M2
CV ECHO LV RWT: 0.33 CM
DOP CALC AO PEAK VEL: 1.43 M/S
DOP CALC AO VTI: 33.05 CM
DOP CALC LVOT AREA: 4.2 CM2
DOP CALC LVOT DIAMETER: 2.3 CM
DOP CALC LVOT PEAK VEL: 1.34 M/S
DOP CALC LVOT STROKE VOLUME: 122.21 CM3
DOP CALC MV VTI: 29.5 CM
DOP CALCLVOT PEAK VEL VTI: 29.43 CM
E WAVE DECELERATION TIME: 229.37 MSEC
E/A RATIO: 1.42
E/E' RATIO: 5.92 M/S
ECHO LV POSTERIOR WALL: 0.88 CM (ref 0.6–1.1)
EJECTION FRACTION: 65 %
FRACTIONAL SHORTENING: 37 % (ref 28–44)
INTERVENTRICULAR SEPTUM: 1.11 CM (ref 0.6–1.1)
LA MAJOR: 4.67 CM
LA MINOR: 4.79 CM
LA WIDTH: 3.54 CM
LEFT ATRIUM SIZE: 3.73 CM
LEFT ATRIUM VOLUME INDEX MOD: 23.3 ML/M2
LEFT ATRIUM VOLUME INDEX: 22.4 ML/M2
LEFT ATRIUM VOLUME MOD: 55.21 CM3
LEFT ATRIUM VOLUME: 53.08 CM3
LEFT INTERNAL DIMENSION IN SYSTOLE: 3.4 CM (ref 2.1–4)
LEFT VENTRICLE DIASTOLIC VOLUME INDEX: 59.97 ML/M2
LEFT VENTRICLE DIASTOLIC VOLUME: 142.14 ML
LEFT VENTRICLE MASS INDEX: 87 G/M2
LEFT VENTRICLE SYSTOLIC VOLUME INDEX: 20 ML/M2
LEFT VENTRICLE SYSTOLIC VOLUME: 47.48 ML
LEFT VENTRICULAR INTERNAL DIMENSION IN DIASTOLE: 5.41 CM (ref 3.5–6)
LEFT VENTRICULAR MASS: 206.01 G
LV LATERAL E/E' RATIO: 6.17 M/S
LV SEPTAL E/E' RATIO: 5.69 M/S
MV A" WAVE DURATION": 13.99 MSEC
MV MEAN GRADIENT: 1 MMHG
MV PEAK A VEL: 0.52 M/S
MV PEAK E VEL: 0.74 M/S
MV PEAK GRADIENT: 3 MMHG
MV STENOSIS PRESSURE HALF TIME: 66.52 MS
MV VALVE AREA BY CONTINUITY EQUATION: 4.14 CM2
MV VALVE AREA P 1/2 METHOD: 3.31 CM2
PISA TR MAX VEL: 3.05 M/S
PULM VEIN S/D RATIO: 1.17
PV PEAK D VEL: 0.58 M/S
PV PEAK S VEL: 0.68 M/S
RA MAJOR: 4.16 CM
RA PRESSURE: 3 MMHG
RA WIDTH: 3.36 CM
RIGHT VENTRICULAR END-DIASTOLIC DIMENSION: 3.72 CM
SINUS: 3.43 CM
STJ: 3.21 CM
TDI LATERAL: 0.12 M/S
TDI SEPTAL: 0.13 M/S
TDI: 0.13 M/S
TR MAX PG: 37 MMHG
TRICUSPID ANNULAR PLANE SYSTOLIC EXCURSION: 2.38 CM
TV REST PULMONARY ARTERY PRESSURE: 40 MMHG

## 2023-07-14 PROCEDURE — 93306 ECHO (CUPID ONLY): ICD-10-PCS | Mod: 26,,, | Performed by: INTERNAL MEDICINE

## 2023-07-14 PROCEDURE — 93306 TTE W/DOPPLER COMPLETE: CPT

## 2023-07-14 PROCEDURE — 93306 TTE W/DOPPLER COMPLETE: CPT | Mod: 26,,, | Performed by: INTERNAL MEDICINE

## 2023-07-14 RX ORDER — METOPROLOL SUCCINATE 50 MG/1
TABLET, EXTENDED RELEASE ORAL
Qty: 90 TABLET | Refills: 3 | Status: SHIPPED | OUTPATIENT
Start: 2023-07-14 | End: 2024-01-29 | Stop reason: SDUPTHER

## 2023-07-14 NOTE — TELEPHONE ENCOUNTER
Refill Decision Note   Nilesh Sanchez  is requesting a refill authorization.  Brief Assessment and Rationale for Refill:  Approve     Medication Therapy Plan:         Comments:     Note composed:1:44 AM 07/14/2023

## 2023-07-14 NOTE — TELEPHONE ENCOUNTER
No care due was identified.  [facility] Embedded Care Due Messages. Reference number: [id number].   7/13/2023 10:26:05 PM CDT

## 2023-07-21 ENCOUNTER — OFFICE VISIT (OUTPATIENT)
Dept: FAMILY MEDICINE | Facility: CLINIC | Age: 68
End: 2023-07-21
Payer: MEDICARE

## 2023-07-21 VITALS
SYSTOLIC BLOOD PRESSURE: 151 MMHG | BODY MASS INDEX: 33.13 KG/M2 | HEIGHT: 73 IN | WEIGHT: 250 LBS | DIASTOLIC BLOOD PRESSURE: 81 MMHG

## 2023-07-21 DIAGNOSIS — I27.20 PULMONARY HTN: Primary | ICD-10-CM

## 2023-07-21 DIAGNOSIS — I10 ESSENTIAL HYPERTENSION: ICD-10-CM

## 2023-07-21 DIAGNOSIS — R60.0 LEG EDEMA: ICD-10-CM

## 2023-07-21 PROCEDURE — 99214 OFFICE O/P EST MOD 30 MIN: CPT | Mod: 95,,, | Performed by: INTERNAL MEDICINE

## 2023-07-21 PROCEDURE — 99214 PR OFFICE/OUTPT VISIT, EST, LEVL IV, 30-39 MIN: ICD-10-PCS | Mod: 95,,, | Performed by: INTERNAL MEDICINE

## 2023-07-21 NOTE — PROGRESS NOTES
Assessment and Plan:    1. Pulmonary HTN    2. Leg edema    3. Essential hypertension    Elevated PA pressure noted on echo to workup leg edema in patient with uncontrolled HTN. Discussed possible causes. No signs of left heart failure. Discussed starting with getting BP better controlled and follow up with Cardiologist. May need to see pulmonologist if workup unremarkable, but no known lung disease, no known prior PEs etc make pulmonary cause less likely.     For BP, had discussed adding additional 5 mg amlodipine in the morning. He is hesitant about making changes at this time, but agreed to start monitoring BID for 2 weeks with digital HTN program, then send me a message.     ______________________________________________________________________  Subjective:    Chief Complaint:  Follow up echo    HPI:  Nilesh is a 68 y.o. year old male patient with telemedicine visit today as consultation for follow up of echocardiogram    The patient location is: home in [place]  The chief complaint leading to consultation is: as above    Visit type: audiovisual    Face to Face time with patient: 15 minutes  20 minutes of total time spent on the encounter, which includes face to face time and non-face to face time preparing to see the patient (eg, review of tests), Obtaining and/or reviewing separately obtained history, Documenting clinical information in the electronic or other health record, Independently interpreting results (not separately reported) and communicating results to the patient/family/caregiver, or Care coordination (not separately reported).       Each patient to whom he or she provides medical services by telemedicine is:  (1) informed of the relationship between the physician and patient and the respective role of any other health care provider with respect to management of the patient; and (2) notified that he or she may decline to receive medical services by telemedicine and may withdraw from such care at any  time.    Notes:     He notes that the leg swelling overall has improved. He has been using compression stockings.     He has been taking BP medications as prescribed (HCTZ 25 mg daily, lisinopril 20 mg BID, metoprolol 50 mg QPM, and amlodipine 5 mg QPM) and has been checking his BP at home. In AM, notes that it is sometimes in the 110s-120s/70, but in the afternoon more often 150s-160s/80s.       Medications:  Current Outpatient Medications on File Prior to Visit   Medication Sig Dispense Refill    lisinopriL (PRINIVIL,ZESTRIL) 20 MG tablet TAKE 1 TABLET BY MOUTH TWICE A  tablet 3    amLODIPine (NORVASC) 5 MG tablet Take 1 tablet (5 mg total) by mouth every evening. 90 tablet 1    aspirin (ECOTRIN) 81 MG EC tablet Take 1 tablet (81 mg total) by mouth once daily.  0    atorvastatin (LIPITOR) 80 MG tablet Take 1 tablet (80 mg total) by mouth every evening. 90 tablet 3    azelastine (ASTELIN) 137 mcg (0.1 %) nasal spray INSTILL 1 SPRAY (137 MCG TOTAL) BY NASAL ROUTE 2 (TWO) TIMES DAILY. 90 mL 0    hydroCHLOROthiazide (HYDRODIURIL) 25 MG tablet Take 1 tablet (25 mg total) by mouth once daily. 90 tablet 1    metoprolol succinate (TOPROL-XL) 50 MG 24 hr tablet TAKE 1 TABLET EVERY EVENING 90 tablet 3    nitroGLYCERIN (NITROSTAT) 0.3 MG SL tablet Place 1 tablet (0.3 mg total) under the tongue every 5 (five) minutes as needed for Chest pain. 100 tablet 3    pantoprazole (PROTONIX) 40 MG tablet TAKE 1 TABLET ONCE DAILY 90 tablet 3    sertraline (ZOLOFT) 100 MG tablet TAKE 1 TABLET ONCE DAILY 90 tablet 3    tadalafiL (CIALIS) 20 MG Tab Take 20 mg by mouth once daily.       No current facility-administered medications on file prior to visit.       Review of Systems:  Review of Systems   Constitutional:  Negative for activity change and unexpected weight change.   HENT:  Negative for hearing loss, rhinorrhea and trouble swallowing.    Eyes:  Negative for discharge and visual disturbance.   Respiratory:  Negative for chest  "tightness and wheezing.    Cardiovascular:  Negative for chest pain and palpitations.   Gastrointestinal:  Positive for diarrhea. Negative for blood in stool, constipation and vomiting.   Endocrine: Negative for polydipsia and polyuria.   Genitourinary:  Negative for difficulty urinating, hematuria and urgency.   Musculoskeletal:  Negative for arthralgias, joint swelling and neck pain.   Neurological:  Negative for weakness and headaches.   Psychiatric/Behavioral:  Negative for confusion and dysphoric mood.      Entered by patient and reviewed and updated during visit      Past Medical History:  Past Medical History:   Diagnosis Date    Allergic rhinosinusitis     Anxiety     Back pain     with spasms    Diverticulitis     s/p partial colectomy    GERD (gastroesophageal reflux disease)     Hx of colonic polyps        Objective:    Vitals:  Vitals:    07/21/23 0952   BP: (!) 151/81   Weight: 113.4 kg (250 lb)   Height: 6' 1" (1.854 m)       Physical Exam  Constitutional:       General: He is not in acute distress.     Appearance: He is well-developed. He is not diaphoretic.   HENT:      Head: Normocephalic and atraumatic.   Pulmonary:      Effort: Pulmonary effort is normal. No respiratory distress.   Neurological:      Mental Status: He is alert and oriented to person, place, and time.   Psychiatric:         Behavior: Behavior normal.         Thought Content: Thought content normal.         Judgment: Judgment normal.       Data:  Cr 1.3 on last labs    Echo  The left ventricle is normal in size with mild eccentric hypertrophy and normal systolic function.  The estimated ejection fraction is 65%.  Normal left ventricular diastolic function.  Normal right ventricular size with normal right ventricular systolic function.  Mild aortic regurgitation.  Normal central venous pressure (3 mmHg).  The estimated PA systolic pressure is 40 mmHg.       Flavia Lange MD  Internal Medicine    "

## 2023-07-23 ENCOUNTER — PATIENT MESSAGE (OUTPATIENT)
Dept: ADMINISTRATIVE | Facility: OTHER | Age: 68
End: 2023-07-23
Payer: MEDICARE

## 2023-07-24 ENCOUNTER — OFFICE VISIT (OUTPATIENT)
Dept: CARDIOLOGY | Facility: CLINIC | Age: 68
End: 2023-07-24
Payer: MEDICARE

## 2023-07-24 VITALS
SYSTOLIC BLOOD PRESSURE: 139 MMHG | RESPIRATION RATE: 20 BRPM | HEIGHT: 73 IN | WEIGHT: 251 LBS | DIASTOLIC BLOOD PRESSURE: 74 MMHG | BODY MASS INDEX: 33.27 KG/M2 | HEART RATE: 67 BPM

## 2023-07-24 DIAGNOSIS — I25.10 CORONARY ARTERY DISEASE INVOLVING NATIVE CORONARY ARTERY OF NATIVE HEART WITHOUT ANGINA PECTORIS: Primary | ICD-10-CM

## 2023-07-24 DIAGNOSIS — E66.01 SEVERE OBESITY (BMI 35.0-39.9) WITH COMORBIDITY: ICD-10-CM

## 2023-07-24 DIAGNOSIS — E78.2 MIXED HYPERLIPIDEMIA: ICD-10-CM

## 2023-07-24 DIAGNOSIS — I50.32 CHRONIC DIASTOLIC HEART FAILURE: ICD-10-CM

## 2023-07-24 DIAGNOSIS — I10 ESSENTIAL HYPERTENSION: ICD-10-CM

## 2023-07-24 DIAGNOSIS — N52.1 ERECTILE DYSFUNCTION DUE TO DISEASES CLASSIFIED ELSEWHERE: ICD-10-CM

## 2023-07-24 DIAGNOSIS — R73.03 PREDIABETES: ICD-10-CM

## 2023-07-24 PROCEDURE — 99214 OFFICE O/P EST MOD 30 MIN: CPT | Mod: S$PBB,,, | Performed by: INTERNAL MEDICINE

## 2023-07-24 PROCEDURE — 99999 PR PBB SHADOW E&M-EST. PATIENT-LVL III: ICD-10-PCS | Mod: PBBFAC,,, | Performed by: INTERNAL MEDICINE

## 2023-07-24 PROCEDURE — 99214 PR OFFICE/OUTPT VISIT, EST, LEVL IV, 30-39 MIN: ICD-10-PCS | Mod: S$PBB,,, | Performed by: INTERNAL MEDICINE

## 2023-07-24 PROCEDURE — 99213 OFFICE O/P EST LOW 20 MIN: CPT | Mod: PBBFAC | Performed by: INTERNAL MEDICINE

## 2023-07-24 PROCEDURE — 99999 PR PBB SHADOW E&M-EST. PATIENT-LVL III: CPT | Mod: PBBFAC,,, | Performed by: INTERNAL MEDICINE

## 2023-07-24 RX ORDER — ICOSAPENT ETHYL 1000 MG/1
2 CAPSULE ORAL 2 TIMES DAILY WITH MEALS
Qty: 360 CAPSULE | Refills: 3 | Status: ACTIVE | OUTPATIENT
Start: 2023-07-24 | End: 2024-01-24

## 2023-07-24 RX ORDER — TADALAFIL 20 MG/1
20 TABLET ORAL DAILY
Qty: 30 TABLET | Refills: 11 | Status: ACTIVE | OUTPATIENT
Start: 2023-07-24 | End: 2024-07-24

## 2023-07-24 NOTE — PROGRESS NOTES
Subjective:   07/24/2023     Patient ID:  Nilesh Sanchez is a 68 y.o. male who presents for evaulation of Coronary artery disease involving native coronary artery of      Comes in for follow-up.  Working on diet, losing weight.  A1c and diabetes better Has coronary artery disease, but currently asymptomatic.    Blood pressure very well controlled at home, 120/70.  Now in digital Medicine program    Hypercholesterolemia on now high-dose statin therapy, has high triglycerides, high LDL.  LDL goal less than 70 mg/dL, close to 50.  In 6/23, total cholesterol 205, HDL 51, LDL 99.6 and triglycerides 272, down from 312.  Triglycerides should be reduced with diet and exercise.    Recent echo shows PA systolic pressure 40, consistent with diastolic heart failure      Recent cardiac catheterization:   Coronary Findings  Diagnostic  Dominance: Right  Left Main  Normal length, no significant stenosis, luminal irregularity noted  Left Anterior Descending  The LAD gives origin to a moderate-sized 1st diagonal branch, luminal irregularity is noted in that vessel. The LAD then has an area of 60% stenosis which is fairly long. The distal LAD gives origin to a small diagonal branch and at the apex small recurrent lateral and inferior branches.  Ramus Intermedius  Absent  Left Circumflex  Generalized luminal irregularity noted. Gives origin to a large 1st marginal branch and a small posterolateral branch.  Right Coronary Artery  The right coronary artery is large giving origin to a large PDA, small moderate and to further small right posterolateral branches. Luminal irregularity is present.    No interventions have been documented.    Recommendations    · Routine post-cath care.  · Maximize medical management.  · ASA 81mg.  · Weight loss.  · Statin therapy.      Patient had initially presented with increasing shortness of breath and chest pain, hydrochlorothiazide had been discontinued, he appeared to be hypertensive and volume  overloaded.  He feels better now with resumption of hydrochlorothiazide therapy along with therapy with beta-blocker and calcium channel blockers.    He will be continued on medical management, hypertension be controlled with beta-blocker, ACE inhibitor, hydrochlorothiazide and calcium channel blocker.      High-dose statin therapy should be continued, LDL goal less than 70, approximately 50.  Atorvastatin will be increased to 80 mg nightly.      Prior note:    Comes in today for evaluation of increasing shortness of breath and chest discomfort.  Yesterday, he had chest tightness lasting 3 hours with associated shortness of breath.  Shortness of breath is worse with activity and he also had exertional chest pain.  That has been present for last 48 hours.  He is also had fairly severe hypertension over the last several weeks with medication changes.  He was switched from lisinopril HCT to valsartan, but became dizzy.  That was discontinued and he was restarted on lisinopril at a higher dose, 20 mg twice a day, but he did not take the hydrochlorothiazide in his been off of hydrochlorothiazide last 2 weeks.  He only resume that yesterday and took another dose today.    He does not have a history of coronary artery disease, cardiac catheterization 20 years ago did not show severe blockages.  Always has an abnormal EKG suggestive of anterior wall infarct, this has never been present though.    Hypertension is present and continues, his blood pressure today lying is 172/94, standing 159/94, pulse 58.  Other antihypertensive medications include metoprolol.      Hyper lipidemia is present.  Recently, he was found have a total cholesterol 229, HDL 50, , triglycerides 312.      He does have prediabetes.      Past Medical History:   Diagnosis Date    Allergic rhinosinusitis     Anxiety     Back pain     with spasms    Diverticulitis     s/p partial colectomy    GERD (gastroesophageal reflux disease)     Hx of colonic  polyps        Review of patient's allergies indicates:  No Known Allergies      Current Outpatient Medications:     amLODIPine (NORVASC) 5 MG tablet, Take 1 tablet (5 mg total) by mouth every evening., Disp: 90 tablet, Rfl: 1    aspirin (ECOTRIN) 81 MG EC tablet, Take 1 tablet (81 mg total) by mouth once daily., Disp: , Rfl: 0    atorvastatin (LIPITOR) 80 MG tablet, Take 1 tablet (80 mg total) by mouth every evening., Disp: 90 tablet, Rfl: 3    azelastine (ASTELIN) 137 mcg (0.1 %) nasal spray, INSTILL 1 SPRAY (137 MCG TOTAL) BY NASAL ROUTE 2 (TWO) TIMES DAILY., Disp: 90 mL, Rfl: 0    hydroCHLOROthiazide (HYDRODIURIL) 25 MG tablet, Take 1 tablet (25 mg total) by mouth once daily., Disp: 90 tablet, Rfl: 1    lisinopriL (PRINIVIL,ZESTRIL) 20 MG tablet, TAKE 1 TABLET BY MOUTH TWICE A DAY, Disp: 180 tablet, Rfl: 3    metoprolol succinate (TOPROL-XL) 50 MG 24 hr tablet, TAKE 1 TABLET EVERY EVENING, Disp: 90 tablet, Rfl: 3    nitroGLYCERIN (NITROSTAT) 0.3 MG SL tablet, Place 1 tablet (0.3 mg total) under the tongue every 5 (five) minutes as needed for Chest pain., Disp: 100 tablet, Rfl: 3    pantoprazole (PROTONIX) 40 MG tablet, TAKE 1 TABLET ONCE DAILY, Disp: 90 tablet, Rfl: 3    sertraline (ZOLOFT) 100 MG tablet, TAKE 1 TABLET ONCE DAILY, Disp: 90 tablet, Rfl: 3    icosapent ethyL (VASCEPA) 1 gram Cap, Take 2 capsules (2 g total) by mouth 2 (two) times daily with meals., Disp: 360 capsule, Rfl: 3    tadalafiL (CIALIS) 20 MG Tab, Take 1 tablet (20 mg total) by mouth once daily., Disp: 30 tablet, Rfl: 11     Objective:   Review of Systems   Cardiovascular:  Negative for chest pain, claudication, cyanosis, dyspnea on exertion, irregular heartbeat, leg swelling, near-syncope, orthopnea, palpitations, paroxysmal nocturnal dyspnea and syncope.       Vitals:    07/24/23 1354   BP: 139/74   Pulse: 67   Resp: 20         Wt Readings from Last 3 Encounters:   07/24/23 113.9 kg (251 lb)   07/21/23 113.4 kg (250 lb)   07/14/23 113.4  kg (250 lb)     Temp Readings from Last 3 Encounters:   03/06/23 98 °F (36.7 °C) (Temporal)   09/06/22 97.3 °F (36.3 °C)   08/24/22 97.3 °F (36.3 °C)     BP Readings from Last 3 Encounters:   07/24/23 139/74   07/21/23 (!) 151/81   07/14/23 (!) 163/76     Pulse Readings from Last 3 Encounters:   07/24/23 67   07/14/23 67   06/13/23 64             Physical Exam  Vitals reviewed.   Constitutional:       General: He is not in acute distress.     Appearance: He is well-developed.   HENT:      Head: Normocephalic and atraumatic.      Nose: Nose normal.   Eyes:      Conjunctiva/sclera: Conjunctivae normal.      Pupils: Pupils are equal, round, and reactive to light.   Neck:      Vascular: No carotid bruit or JVD.   Cardiovascular:      Rate and Rhythm: Normal rate and regular rhythm.      Pulses: Normal pulses and intact distal pulses.      Heart sounds: Normal heart sounds. No murmur heard.    No friction rub. No gallop.   Pulmonary:      Effort: Pulmonary effort is normal. No respiratory distress.      Breath sounds: Normal breath sounds. No wheezing or rales.   Chest:      Chest wall: No tenderness.   Abdominal:      General: Bowel sounds are normal. There is no distension.      Palpations: Abdomen is soft.      Tenderness: There is no abdominal tenderness.   Musculoskeletal:         General: No tenderness or deformity. Normal range of motion.      Cervical back: Normal range of motion and neck supple.      Right lower leg: No edema.      Left lower leg: No edema.   Skin:     General: Skin is warm and dry.      Findings: No erythema or rash.   Neurological:      Mental Status: He is alert and oriented to person, place, and time.      Cranial Nerves: No cranial nerve deficit.      Motor: No abnormal muscle tone.      Coordination: Coordination normal.   Psychiatric:         Behavior: Behavior normal.         Thought Content: Thought content normal.         Judgment: Judgment normal.         Lab Results   Component Value  Date    CHOL 205 (H) 06/14/2023    CHOL 229 (H) 01/06/2023    CHOL 202 (H) 12/09/2021     Lab Results   Component Value Date    HDL 51 06/14/2023    HDL 50 01/06/2023    HDL 50 12/09/2021     Lab Results   Component Value Date    LDLCALC 99.6 06/14/2023    LDLCALC 116.6 01/06/2023    LDLCALC 111.0 12/09/2021     Lab Results   Component Value Date    ALT 28 06/09/2023    AST 33 06/09/2023    AST 34 01/06/2023    AST 26 12/09/2021     Lab Results   Component Value Date    CREATININE 1.3 06/14/2023    BUN 36 (H) 06/14/2023     06/14/2023    K 4.2 06/14/2023    CO2 28 06/14/2023    CO2 31 (H) 06/09/2023    CO2 29 03/06/2023     Lab Results   Component Value Date    HGB 13.0 (L) 03/06/2023    HCT 40.3 03/06/2023    HCT 44.1 05/22/2020    HCT 40.4 10/19/2019                 Lab Results   Component Value Date    CHOL 205 (H) 06/14/2023    CHOL 229 (H) 01/06/2023    CHOL 202 (H) 12/09/2021     Lab Results   Component Value Date    HDL 51 06/14/2023    HDL 50 01/06/2023    HDL 50 12/09/2021     Lab Results   Component Value Date    LDLCALC 99.6 06/14/2023    LDLCALC 116.6 01/06/2023    LDLCALC 111.0 12/09/2021     Lab Results   Component Value Date    ALT 28 06/09/2023    AST 33 06/09/2023    AST 34 01/06/2023    AST 26 12/09/2021     Lab Results   Component Value Date    CREATININE 1.3 06/14/2023    BUN 36 (H) 06/14/2023     06/14/2023    K 4.2 06/14/2023    CO2 28 06/14/2023    CO2 31 (H) 06/09/2023    CO2 29 03/06/2023     Lab Results   Component Value Date    HGB 13.0 (L) 03/06/2023    HCT 40.3 03/06/2023    HCT 44.1 05/22/2020    HCT 40.4 10/19/2019                      Lab Results   Component Value Date     06/14/2023    K 4.2 06/14/2023     06/14/2023    CO2 28 06/14/2023    BUN 36 (H) 06/14/2023    CREATININE 1.3 06/14/2023    GLU 92 06/14/2023    HGBA1C 5.6 06/09/2023    AST 33 06/09/2023    ALT 28 06/09/2023    ALBUMIN 3.8 06/09/2023    PROT 7.1 06/09/2023    BILITOT 0.4 06/09/2023    WBC  5.46 03/06/2023    HGB 13.0 (L) 03/06/2023    HCT 40.3 03/06/2023    MCV 96 03/06/2023     03/06/2023    INR 1.0 08/10/2018    PSA 0.45 01/06/2023    TSH 2.028 02/15/2018    CHOL 205 (H) 06/14/2023    HDL 51 06/14/2023    LDLCALC 99.6 06/14/2023    LDLCALC 85 04/19/2017    TRIG 272 (H) 06/14/2023            Assessment and Plan:     Coronary artery disease involving native coronary artery of native heart without angina pectoris  Comments:  Remains asymptomatic  Orders:  -     icosapent ethyL (VASCEPA) 1 gram Cap; Take 2 capsules (2 g total) by mouth 2 (two) times daily with meals.  Dispense: 360 capsule; Refill: 3    Essential hypertension  Comments:  Blood pressure well controlled in digital Medicine follow-up    Mixed hyperlipidemia  Comments:  LDL dropping, triglycerides dropping, but still elevated on max dose atorvastatin.  Add prescription fish oil.  Orders:  -     icosapent ethyL (VASCEPA) 1 gram Cap; Take 2 capsules (2 g total) by mouth 2 (two) times daily with meals.  Dispense: 360 capsule; Refill: 3    Severe obesity (BMI 35.0-39.9) with comorbidity  Comments:  Losing weight  Orders:  -     icosapent ethyL (VASCEPA) 1 gram Cap; Take 2 capsules (2 g total) by mouth 2 (two) times daily with meals.  Dispense: 360 capsule; Refill: 3    Prediabetes  Comments:  Last A1c normal  Orders:  -     icosapent ethyL (VASCEPA) 1 gram Cap; Take 2 capsules (2 g total) by mouth 2 (two) times daily with meals.  Dispense: 360 capsule; Refill: 3    Chronic diastolic heart failure  Comments:  Associated with pulmonary hypertension, noted to be mild on echo Doppler.  Continue optimal blood pressure control, weight loss, diuretic.    Erectile dysfunction due to diseases classified elsewhere  -     tadalafiL (CIALIS) 20 MG Tab; Take 1 tablet (20 mg total) by mouth once daily.  Dispense: 30 tablet; Refill: 11               Follow up in about 6 months (around 1/24/2024).          No future appointments.

## 2023-07-27 ENCOUNTER — PATIENT MESSAGE (OUTPATIENT)
Dept: CARDIOLOGY | Facility: CLINIC | Age: 68
End: 2023-07-27
Payer: MEDICARE

## 2023-07-27 DIAGNOSIS — N52.1 ERECTILE DYSFUNCTION DUE TO DISEASES CLASSIFIED ELSEWHERE: ICD-10-CM

## 2023-07-27 RX ORDER — TADALAFIL 20 MG/1
20 TABLET ORAL DAILY
Qty: 30 TABLET | Refills: 2 | Status: CANCELLED | OUTPATIENT
Start: 2023-07-27 | End: 2024-07-27

## 2023-09-11 DIAGNOSIS — I10 ESSENTIAL HYPERTENSION: ICD-10-CM

## 2023-09-11 NOTE — TELEPHONE ENCOUNTER
No care due was identified.  Health [facility] Embedded Care Due Messages. Reference number: [id number].   9/11/2023 10:27:30 AM CDT

## 2023-09-13 RX ORDER — HYDROCHLOROTHIAZIDE 25 MG/1
25 TABLET ORAL DAILY
Qty: 90 TABLET | Refills: 1 | Status: SHIPPED | OUTPATIENT
Start: 2023-09-13 | End: 2024-01-29 | Stop reason: SDUPTHER

## 2023-10-08 DIAGNOSIS — I25.10 CORONARY ARTERY DISEASE INVOLVING NATIVE CORONARY ARTERY OF NATIVE HEART WITHOUT ANGINA PECTORIS: ICD-10-CM

## 2023-10-08 DIAGNOSIS — I10 ESSENTIAL HYPERTENSION: ICD-10-CM

## 2023-10-08 NOTE — TELEPHONE ENCOUNTER
No care due was identified.  Health [facility] Embedded Care Due Messages. Reference number: [id number].   10/08/2023 8:43:55 AM CDT

## 2023-10-10 RX ORDER — LISINOPRIL 20 MG/1
20 TABLET ORAL 2 TIMES DAILY
Qty: 180 TABLET | Refills: 0 | Status: SHIPPED | OUTPATIENT
Start: 2023-10-10 | End: 2023-12-27

## 2023-10-10 RX ORDER — AMLODIPINE BESYLATE 5 MG/1
5 TABLET ORAL NIGHTLY
Qty: 90 TABLET | Refills: 0 | Status: SHIPPED | OUTPATIENT
Start: 2023-10-10 | End: 2023-12-27

## 2023-12-26 DIAGNOSIS — I25.10 CORONARY ARTERY DISEASE INVOLVING NATIVE CORONARY ARTERY OF NATIVE HEART WITHOUT ANGINA PECTORIS: ICD-10-CM

## 2023-12-26 DIAGNOSIS — I10 ESSENTIAL HYPERTENSION: ICD-10-CM

## 2023-12-26 NOTE — TELEPHONE ENCOUNTER
No care due was identified.  [facility] Embedded Care Due Messages. Reference number: [id number].   12/26/2023 8:26:21 AM CST

## 2023-12-27 RX ORDER — AMLODIPINE BESYLATE 5 MG/1
5 TABLET ORAL NIGHTLY
Qty: 90 TABLET | Refills: 2 | Status: SHIPPED | OUTPATIENT
Start: 2023-12-27 | End: 2024-01-29 | Stop reason: SDUPTHER

## 2023-12-27 RX ORDER — LISINOPRIL 20 MG/1
20 TABLET ORAL 2 TIMES DAILY
Qty: 180 TABLET | Refills: 1 | Status: SHIPPED | OUTPATIENT
Start: 2023-12-27 | End: 2024-01-29 | Stop reason: SDUPTHER

## 2023-12-27 NOTE — TELEPHONE ENCOUNTER
Refill Decision Note   Nilesh Sanchez  is requesting a refill authorization.  Brief Assessment and Rationale for Refill:  Approve     Medication Therapy Plan:        Comments:     Note composed:10:36 AM 12/27/2023

## 2024-01-24 ENCOUNTER — OFFICE VISIT (OUTPATIENT)
Dept: CARDIOLOGY | Facility: CLINIC | Age: 69
End: 2024-01-24
Payer: MEDICARE

## 2024-01-24 VITALS
DIASTOLIC BLOOD PRESSURE: 63 MMHG | SYSTOLIC BLOOD PRESSURE: 120 MMHG | WEIGHT: 255.75 LBS | BODY MASS INDEX: 33.9 KG/M2 | HEART RATE: 71 BPM | HEIGHT: 73 IN

## 2024-01-24 DIAGNOSIS — E78.2 MIXED HYPERLIPIDEMIA: Chronic | ICD-10-CM

## 2024-01-24 DIAGNOSIS — I25.10 CORONARY ARTERY DISEASE INVOLVING NATIVE CORONARY ARTERY OF NATIVE HEART WITHOUT ANGINA PECTORIS: Primary | Chronic | ICD-10-CM

## 2024-01-24 DIAGNOSIS — I50.32 CHRONIC DIASTOLIC HEART FAILURE: ICD-10-CM

## 2024-01-24 DIAGNOSIS — E66.01 SEVERE OBESITY (BMI 35.0-39.9) WITH COMORBIDITY: ICD-10-CM

## 2024-01-24 DIAGNOSIS — I10 ESSENTIAL HYPERTENSION: Chronic | ICD-10-CM

## 2024-01-24 PROCEDURE — 99213 OFFICE O/P EST LOW 20 MIN: CPT | Mod: PBBFAC | Performed by: INTERNAL MEDICINE

## 2024-01-24 PROCEDURE — 99999 PR PBB SHADOW E&M-EST. PATIENT-LVL III: CPT | Mod: PBBFAC,,, | Performed by: INTERNAL MEDICINE

## 2024-01-24 PROCEDURE — 99214 OFFICE O/P EST MOD 30 MIN: CPT | Mod: S$PBB,,, | Performed by: INTERNAL MEDICINE

## 2024-01-24 RX ORDER — EZETIMIBE 10 MG/1
10 TABLET ORAL DAILY
Qty: 90 TABLET | Refills: 3 | Status: SHIPPED | OUTPATIENT
Start: 2024-01-24 | End: 2024-01-29 | Stop reason: SDUPTHER

## 2024-01-24 NOTE — PROGRESS NOTES
Subjective:   01/24/2024     Patient ID:  Nilesh Sanchez is a 68 y.o. male who presents for evaulation of No chief complaint on file.      Comes in for follow-up.  Working on diet, weight loss plateau.  A1c and diabetes better.  Has coronary artery disease, but currently asymptomatic.    Blood pressure very well controlled at home, 120/70.  Now in digital Medicine program    Hypercholesterolemia on now high-dose statin therapy, has high triglycerides, high LDL.  LDL goal less than 70 mg/dL, close to 50.  In 6/23, total cholesterol 205, HDL 51, LDL 99.6 and triglycerides 272, down from 312.  Triglycerides should be reduced with diet and exercise.  Tried prescription fish oil, too expensive.    Recent echo shows PA systolic pressure 40, consistent with diastolic heart failure      Recent cardiac catheterization:   Coronary Findings  Diagnostic  Dominance: Right  Left Main  Normal length, no significant stenosis, luminal irregularity noted  Left Anterior Descending  The LAD gives origin to a moderate-sized 1st diagonal branch, luminal irregularity is noted in that vessel. The LAD then has an area of 60% stenosis which is fairly long. The distal LAD gives origin to a small diagonal branch and at the apex small recurrent lateral and inferior branches.  Ramus Intermedius  Absent  Left Circumflex  Generalized luminal irregularity noted. Gives origin to a large 1st marginal branch and a small posterolateral branch.  Right Coronary Artery  The right coronary artery is large giving origin to a large PDA, small moderate and to further small right posterolateral branches. Luminal irregularity is present.    No interventions have been documented.    Recommendations    · Routine post-cath care.  · Maximize medical management.  · ASA 81mg.  · Weight loss.  · Statin therapy.      Patient had initially presented with increasing shortness of breath and chest pain, hydrochlorothiazide had been discontinued, he appeared to be  hypertensive and volume overloaded.  He feels better now with resumption of hydrochlorothiazide therapy along with therapy with beta-blocker and calcium channel blockers.    He will be continued on medical management, hypertension be controlled with beta-blocker, ACE inhibitor, hydrochlorothiazide and calcium channel blocker.      High-dose statin therapy should be continued, LDL goal less than 70, approximately 50.  Atorvastatin will be increased to 80 mg nightly.      Prior note:    Comes in today for evaluation of increasing shortness of breath and chest discomfort.  Yesterday, he had chest tightness lasting 3 hours with associated shortness of breath.  Shortness of breath is worse with activity and he also had exertional chest pain.  That has been present for last 48 hours.  He is also had fairly severe hypertension over the last several weeks with medication changes.  He was switched from lisinopril HCT to valsartan, but became dizzy.  That was discontinued and he was restarted on lisinopril at a higher dose, 20 mg twice a day, but he did not take the hydrochlorothiazide in his been off of hydrochlorothiazide last 2 weeks.  He only resume that yesterday and took another dose today.    He does not have a history of coronary artery disease, cardiac catheterization 20 years ago did not show severe blockages.  Always has an abnormal EKG suggestive of anterior wall infarct, this has never been present though.    Hypertension is present and continues, his blood pressure today lying is 172/94, standing 159/94, pulse 58.  Other antihypertensive medications include metoprolol.      Hyper lipidemia is present.  Recently, he was found have a total cholesterol 229, HDL 50, , triglycerides 312.      He does have prediabetes.        Past Medical History:   Diagnosis Date    Allergic rhinosinusitis     Anxiety     Back pain     with spasms    Diverticulitis     s/p partial colectomy    GERD (gastroesophageal reflux  disease)     Hx of colonic polyps        Review of patient's allergies indicates:  No Known Allergies      Current Outpatient Medications:     amLODIPine (NORVASC) 5 MG tablet, TAKE 1 TABLET EVERY EVENING, Disp: 90 tablet, Rfl: 2    aspirin (ECOTRIN) 81 MG EC tablet, Take 1 tablet (81 mg total) by mouth once daily., Disp: , Rfl: 0    atorvastatin (LIPITOR) 80 MG tablet, Take 1 tablet (80 mg total) by mouth every evening., Disp: 90 tablet, Rfl: 3    hydroCHLOROthiazide (HYDRODIURIL) 25 MG tablet, Take 1 tablet (25 mg total) by mouth once daily., Disp: 90 tablet, Rfl: 1    lisinopriL (PRINIVIL,ZESTRIL) 20 MG tablet, TAKE 1 TABLET TWICE A DAY, Disp: 180 tablet, Rfl: 1    metoprolol succinate (TOPROL-XL) 50 MG 24 hr tablet, TAKE 1 TABLET EVERY EVENING, Disp: 90 tablet, Rfl: 3    nitroGLYCERIN (NITROSTAT) 0.3 MG SL tablet, Place 1 tablet (0.3 mg total) under the tongue every 5 (five) minutes as needed for Chest pain., Disp: 100 tablet, Rfl: 3    pantoprazole (PROTONIX) 40 MG tablet, TAKE 1 TABLET ONCE DAILY, Disp: 90 tablet, Rfl: 3    sertraline (ZOLOFT) 100 MG tablet, TAKE 1 TABLET ONCE DAILY, Disp: 90 tablet, Rfl: 3    tadalafiL (CIALIS) 20 MG Tab, Take 1 tablet (20 mg total) by mouth once daily., Disp: 30 tablet, Rfl: 11    ezetimibe (ZETIA) 10 mg tablet, Take 1 tablet (10 mg total) by mouth once daily., Disp: 90 tablet, Rfl: 3     Objective:   Review of Systems   Cardiovascular:  Negative for chest pain, claudication, cyanosis, dyspnea on exertion, irregular heartbeat, leg swelling, near-syncope, orthopnea, palpitations, paroxysmal nocturnal dyspnea and syncope.         Vitals:    01/24/24 1117   BP: 120/63   Pulse: 71         Wt Readings from Last 3 Encounters:   01/24/24 116 kg (255 lb 11.7 oz)   07/24/23 113.9 kg (251 lb)   07/21/23 113.4 kg (250 lb)     Temp Readings from Last 3 Encounters:   03/06/23 98 °F (36.7 °C) (Temporal)   09/06/22 97.3 °F (36.3 °C)   08/24/22 97.3 °F (36.3 °C)     BP Readings from Last 3  Encounters:   01/24/24 120/63   07/24/23 139/74   07/21/23 (!) 151/81     Pulse Readings from Last 3 Encounters:   01/24/24 71   07/24/23 67   07/14/23 67             Physical Exam  Vitals reviewed.   Constitutional:       General: He is not in acute distress.     Appearance: He is well-developed.   HENT:      Head: Normocephalic and atraumatic.      Nose: Nose normal.   Eyes:      Conjunctiva/sclera: Conjunctivae normal.      Pupils: Pupils are equal, round, and reactive to light.   Neck:      Vascular: No carotid bruit or JVD.   Cardiovascular:      Rate and Rhythm: Normal rate and regular rhythm.      Pulses: Normal pulses and intact distal pulses.      Heart sounds: Normal heart sounds. No murmur heard.     No friction rub. No gallop.   Pulmonary:      Effort: Pulmonary effort is normal. No respiratory distress.      Breath sounds: Normal breath sounds. No wheezing or rales.   Chest:      Chest wall: No tenderness.   Abdominal:      General: Bowel sounds are normal. There is no distension.      Palpations: Abdomen is soft.      Tenderness: There is no abdominal tenderness.   Musculoskeletal:         General: No tenderness or deformity. Normal range of motion.      Cervical back: Normal range of motion and neck supple.      Right lower leg: No edema.      Left lower leg: No edema.   Skin:     General: Skin is warm and dry.      Findings: No erythema or rash.   Neurological:      Mental Status: He is alert and oriented to person, place, and time.      Cranial Nerves: No cranial nerve deficit.      Motor: No abnormal muscle tone.      Coordination: Coordination normal.   Psychiatric:         Behavior: Behavior normal.         Thought Content: Thought content normal.         Judgment: Judgment normal.           Lab Results   Component Value Date    CHOL 205 (H) 06/14/2023    CHOL 229 (H) 01/06/2023    CHOL 202 (H) 12/09/2021     Lab Results   Component Value Date    HDL 51 06/14/2023    HDL 50 01/06/2023    HDL 50  12/09/2021     Lab Results   Component Value Date    LDLCALC 99.6 06/14/2023    LDLCALC 116.6 01/06/2023    LDLCALC 111.0 12/09/2021     Lab Results   Component Value Date    ALT 28 06/09/2023    AST 33 06/09/2023    AST 34 01/06/2023    AST 26 12/09/2021     Lab Results   Component Value Date    CREATININE 1.3 06/14/2023    BUN 36 (H) 06/14/2023     06/14/2023    K 4.2 06/14/2023    CO2 28 06/14/2023    CO2 31 (H) 06/09/2023    CO2 29 03/06/2023     Lab Results   Component Value Date    HGB 13.0 (L) 03/06/2023    HCT 40.3 03/06/2023    HCT 44.1 05/22/2020    HCT 40.4 10/19/2019                 Lab Results   Component Value Date    CHOL 205 (H) 06/14/2023    CHOL 229 (H) 01/06/2023    CHOL 202 (H) 12/09/2021     Lab Results   Component Value Date    HDL 51 06/14/2023    HDL 50 01/06/2023    HDL 50 12/09/2021     Lab Results   Component Value Date    LDLCALC 99.6 06/14/2023    LDLCALC 116.6 01/06/2023    LDLCALC 111.0 12/09/2021     Lab Results   Component Value Date    ALT 28 06/09/2023    AST 33 06/09/2023    AST 34 01/06/2023    AST 26 12/09/2021     Lab Results   Component Value Date    CREATININE 1.3 06/14/2023    BUN 36 (H) 06/14/2023     06/14/2023    K 4.2 06/14/2023    CO2 28 06/14/2023    CO2 31 (H) 06/09/2023    CO2 29 03/06/2023     Lab Results   Component Value Date    HGB 13.0 (L) 03/06/2023    HCT 40.3 03/06/2023    HCT 44.1 05/22/2020    HCT 40.4 10/19/2019                      Lab Results   Component Value Date     06/14/2023    K 4.2 06/14/2023     06/14/2023    CO2 28 06/14/2023    BUN 36 (H) 06/14/2023    CREATININE 1.3 06/14/2023    GLU 92 06/14/2023    HGBA1C 5.6 06/09/2023    AST 33 06/09/2023    ALT 28 06/09/2023    ALBUMIN 3.8 06/09/2023    PROT 7.1 06/09/2023    BILITOT 0.4 06/09/2023    WBC 5.46 03/06/2023    HGB 13.0 (L) 03/06/2023    HCT 40.3 03/06/2023    MCV 96 03/06/2023     03/06/2023    INR 1.0 08/10/2018    PSA 0.45 01/06/2023    TSH 2.028 02/15/2018     CHOL 205 (H) 06/14/2023    HDL 51 06/14/2023    LDLCALC 99.6 06/14/2023    LDLCALC 85 04/19/2017    TRIG 272 (H) 06/14/2023            Assessment and Plan:     Coronary artery disease involving native coronary artery of native heart without angina pectoris  Comments:  Asymptomatic    Chronic diastolic heart failure  Comments:  Compensated  Orders:  -     Echo; Future; Expected date: 07/24/2024    Severe obesity (BMI 35.0-39.9) with comorbidity    Mixed hyperlipidemia  Comments:  Add ezetimibe  Weight loss for triglycerides  Check lab  Orders:  -     ezetimibe (ZETIA) 10 mg tablet; Take 1 tablet (10 mg total) by mouth once daily.  Dispense: 90 tablet; Refill: 3  -     Lipoprotein A (LPA); Future; Expected date: 02/24/2024  -     Lipid Panel; Future; Expected date: 02/24/2024  -     Apolipoprotein B; Future; Expected date: 02/24/2024    Essential hypertension  Comments:  Good with digital Medicine  Orders:  -     Comprehensive Metabolic Panel; Future; Expected date: 02/24/2024               Follow up in about 6 months (around 7/24/2024).          No future appointments.

## 2024-01-28 ENCOUNTER — PATIENT MESSAGE (OUTPATIENT)
Dept: CARDIOLOGY | Facility: CLINIC | Age: 69
End: 2024-01-28
Payer: MEDICARE

## 2024-01-28 ENCOUNTER — PATIENT MESSAGE (OUTPATIENT)
Dept: FAMILY MEDICINE | Facility: CLINIC | Age: 69
End: 2024-01-28
Payer: MEDICARE

## 2024-01-29 DIAGNOSIS — I25.10 CORONARY ARTERY DISEASE INVOLVING NATIVE CORONARY ARTERY OF NATIVE HEART WITHOUT ANGINA PECTORIS: ICD-10-CM

## 2024-01-29 DIAGNOSIS — E78.2 MIXED HYPERLIPIDEMIA: ICD-10-CM

## 2024-01-29 DIAGNOSIS — K21.9 GASTROESOPHAGEAL REFLUX DISEASE: ICD-10-CM

## 2024-01-29 DIAGNOSIS — I25.110 ATHEROSCLEROSIS OF NATIVE CORONARY ARTERY OF NATIVE HEART WITH UNSTABLE ANGINA PECTORIS: ICD-10-CM

## 2024-01-29 DIAGNOSIS — E78.2 MIXED HYPERLIPIDEMIA: Chronic | ICD-10-CM

## 2024-01-29 DIAGNOSIS — I10 ESSENTIAL HYPERTENSION: ICD-10-CM

## 2024-01-29 RX ORDER — EZETIMIBE 10 MG/1
10 TABLET ORAL DAILY
Qty: 90 TABLET | Refills: 3 | Status: SHIPPED | OUTPATIENT
Start: 2024-01-29 | End: 2024-01-30 | Stop reason: SDUPTHER

## 2024-01-29 RX ORDER — METOPROLOL SUCCINATE 50 MG/1
50 TABLET, EXTENDED RELEASE ORAL NIGHTLY
Qty: 90 TABLET | Refills: 3 | Status: SHIPPED | OUTPATIENT
Start: 2024-01-29

## 2024-01-29 RX ORDER — LISINOPRIL 20 MG/1
20 TABLET ORAL 2 TIMES DAILY
Qty: 180 TABLET | Refills: 3 | Status: SHIPPED | OUTPATIENT
Start: 2024-01-29

## 2024-01-29 RX ORDER — NITROGLYCERIN 0.3 MG/1
0.3 TABLET SUBLINGUAL EVERY 5 MIN PRN
Qty: 100 TABLET | Refills: 3 | Status: SHIPPED | OUTPATIENT
Start: 2024-01-29 | End: 2025-01-28

## 2024-01-29 RX ORDER — ATORVASTATIN CALCIUM 80 MG/1
80 TABLET, FILM COATED ORAL NIGHTLY
Qty: 90 TABLET | Refills: 3 | Status: SHIPPED | OUTPATIENT
Start: 2024-01-29 | End: 2025-01-29

## 2024-01-29 RX ORDER — AMLODIPINE BESYLATE 5 MG/1
5 TABLET ORAL NIGHTLY
Qty: 90 TABLET | Refills: 3 | Status: SHIPPED | OUTPATIENT
Start: 2024-01-29 | End: 2024-05-03 | Stop reason: SDUPTHER

## 2024-01-29 RX ORDER — HYDROCHLOROTHIAZIDE 25 MG/1
25 TABLET ORAL DAILY
Qty: 90 TABLET | Refills: 3 | Status: SHIPPED | OUTPATIENT
Start: 2024-01-29 | End: 2025-01-28

## 2024-01-30 ENCOUNTER — PATIENT MESSAGE (OUTPATIENT)
Dept: CARDIOLOGY | Facility: CLINIC | Age: 69
End: 2024-01-30
Payer: MEDICARE

## 2024-01-30 DIAGNOSIS — E78.2 MIXED HYPERLIPIDEMIA: Chronic | ICD-10-CM

## 2024-01-30 RX ORDER — EZETIMIBE 10 MG/1
10 TABLET ORAL DAILY
Qty: 90 TABLET | Refills: 3 | Status: SHIPPED | OUTPATIENT
Start: 2024-01-30 | End: 2024-05-03 | Stop reason: SDUPTHER

## 2024-02-27 DIAGNOSIS — Z00.00 ENCOUNTER FOR MEDICARE ANNUAL WELLNESS EXAM: ICD-10-CM

## 2024-04-29 ENCOUNTER — PATIENT MESSAGE (OUTPATIENT)
Dept: FAMILY MEDICINE | Facility: CLINIC | Age: 69
End: 2024-04-29
Payer: MEDICARE

## 2024-04-29 DIAGNOSIS — Z12.5 SCREENING FOR MALIGNANT NEOPLASM OF PROSTATE: Primary | ICD-10-CM

## 2024-05-01 ENCOUNTER — PATIENT MESSAGE (OUTPATIENT)
Dept: FAMILY MEDICINE | Facility: CLINIC | Age: 69
End: 2024-05-01
Payer: MEDICARE

## 2024-05-02 ENCOUNTER — LAB VISIT (OUTPATIENT)
Dept: LAB | Facility: HOSPITAL | Age: 69
End: 2024-05-02
Attending: INTERNAL MEDICINE
Payer: MEDICARE

## 2024-05-02 DIAGNOSIS — Z12.5 SCREENING FOR MALIGNANT NEOPLASM OF PROSTATE: ICD-10-CM

## 2024-05-02 DIAGNOSIS — E78.49 OTHER HYPERLIPIDEMIA: ICD-10-CM

## 2024-05-02 DIAGNOSIS — I10 ESSENTIAL HYPERTENSION: Chronic | ICD-10-CM

## 2024-05-02 DIAGNOSIS — E78.2 MIXED HYPERLIPIDEMIA: Chronic | ICD-10-CM

## 2024-05-02 PROCEDURE — 80053 COMPREHEN METABOLIC PANEL: CPT | Performed by: INTERNAL MEDICINE

## 2024-05-02 PROCEDURE — 84443 ASSAY THYROID STIM HORMONE: CPT | Performed by: INTERNAL MEDICINE

## 2024-05-02 PROCEDURE — 80061 LIPID PANEL: CPT | Performed by: INTERNAL MEDICINE

## 2024-05-02 PROCEDURE — 83695 ASSAY OF LIPOPROTEIN(A): CPT | Performed by: INTERNAL MEDICINE

## 2024-05-02 PROCEDURE — 84153 ASSAY OF PSA TOTAL: CPT | Performed by: INTERNAL MEDICINE

## 2024-05-03 ENCOUNTER — OFFICE VISIT (OUTPATIENT)
Dept: FAMILY MEDICINE | Facility: CLINIC | Age: 69
End: 2024-05-03
Payer: MEDICARE

## 2024-05-03 VITALS
DIASTOLIC BLOOD PRESSURE: 74 MMHG | RESPIRATION RATE: 16 BRPM | BODY MASS INDEX: 34.68 KG/M2 | HEART RATE: 64 BPM | WEIGHT: 261.69 LBS | SYSTOLIC BLOOD PRESSURE: 154 MMHG | HEIGHT: 73 IN

## 2024-05-03 DIAGNOSIS — F41.9 CHRONIC ANXIETY: ICD-10-CM

## 2024-05-03 DIAGNOSIS — K21.9 GASTROESOPHAGEAL REFLUX DISEASE: ICD-10-CM

## 2024-05-03 DIAGNOSIS — I10 ESSENTIAL HYPERTENSION: ICD-10-CM

## 2024-05-03 DIAGNOSIS — E78.2 MIXED HYPERLIPIDEMIA: Primary | Chronic | ICD-10-CM

## 2024-05-03 DIAGNOSIS — I25.10 CORONARY ARTERY DISEASE INVOLVING NATIVE CORONARY ARTERY OF NATIVE HEART WITHOUT ANGINA PECTORIS: ICD-10-CM

## 2024-05-03 LAB
ALBUMIN SERPL BCP-MCNC: 4 G/DL (ref 3.5–5.2)
ALP SERPL-CCNC: 97 U/L (ref 55–135)
ALT SERPL W/O P-5'-P-CCNC: 28 U/L (ref 10–44)
ANION GAP SERPL CALC-SCNC: 9 MMOL/L (ref 8–16)
AST SERPL-CCNC: 26 U/L (ref 10–40)
BILIRUB SERPL-MCNC: 0.3 MG/DL (ref 0.1–1)
BUN SERPL-MCNC: 26 MG/DL (ref 8–23)
CALCIUM SERPL-MCNC: 10.1 MG/DL (ref 8.7–10.5)
CHLORIDE SERPL-SCNC: 101 MMOL/L (ref 95–110)
CHOLEST SERPL-MCNC: 211 MG/DL (ref 120–199)
CHOLEST/HDLC SERPL: 4.1 {RATIO} (ref 2–5)
CO2 SERPL-SCNC: 30 MMOL/L (ref 23–29)
COMPLEXED PSA SERPL-MCNC: 0.47 NG/ML (ref 0–4)
CREAT SERPL-MCNC: 1 MG/DL (ref 0.5–1.4)
EST. GFR  (NO RACE VARIABLE): >60 ML/MIN/1.73 M^2
GLUCOSE SERPL-MCNC: 109 MG/DL (ref 70–110)
HDLC SERPL-MCNC: 51 MG/DL (ref 40–75)
HDLC SERPL: 24.2 % (ref 20–50)
LDLC SERPL CALC-MCNC: 115 MG/DL (ref 63–159)
NONHDLC SERPL-MCNC: 160 MG/DL
POTASSIUM SERPL-SCNC: 4.8 MMOL/L (ref 3.5–5.1)
PROT SERPL-MCNC: 6.8 G/DL (ref 6–8.4)
SODIUM SERPL-SCNC: 140 MMOL/L (ref 136–145)
TRIGL SERPL-MCNC: 225 MG/DL (ref 30–150)
TSH SERPL DL<=0.005 MIU/L-ACNC: 1.65 UIU/ML (ref 0.4–4)

## 2024-05-03 PROCEDURE — 99213 OFFICE O/P EST LOW 20 MIN: CPT | Mod: PBBFAC,PN | Performed by: INTERNAL MEDICINE

## 2024-05-03 PROCEDURE — 99214 OFFICE O/P EST MOD 30 MIN: CPT | Mod: S$PBB,,, | Performed by: INTERNAL MEDICINE

## 2024-05-03 PROCEDURE — 99999 PR PBB SHADOW E&M-EST. PATIENT-LVL III: CPT | Mod: PBBFAC,,, | Performed by: INTERNAL MEDICINE

## 2024-05-03 RX ORDER — PANTOPRAZOLE SODIUM 40 MG/1
TABLET, DELAYED RELEASE ORAL
Refills: 0 | OUTPATIENT
Start: 2024-05-03

## 2024-05-03 RX ORDER — EZETIMIBE 10 MG/1
10 TABLET ORAL DAILY
Qty: 90 TABLET | Refills: 3 | Status: SHIPPED | OUTPATIENT
Start: 2024-05-03 | End: 2025-05-03

## 2024-05-03 RX ORDER — AMLODIPINE BESYLATE 5 MG/1
5 TABLET ORAL 2 TIMES DAILY
Qty: 180 TABLET | Refills: 3 | Status: SHIPPED | OUTPATIENT
Start: 2024-05-03

## 2024-05-03 RX ORDER — PANTOPRAZOLE SODIUM 40 MG/1
40 TABLET, DELAYED RELEASE ORAL DAILY
Qty: 90 TABLET | Refills: 3 | Status: SHIPPED | OUTPATIENT
Start: 2024-05-03

## 2024-05-03 RX ORDER — SERTRALINE HYDROCHLORIDE 100 MG/1
100 TABLET, FILM COATED ORAL DAILY
Qty: 90 TABLET | Refills: 3 | Status: SHIPPED | OUTPATIENT
Start: 2024-05-03

## 2024-05-03 NOTE — TELEPHONE ENCOUNTER
Refill Decision Note   Nilesh Sanchez  is requesting a refill authorization.  Brief Assessment and Rationale for Refill:  Quick Discontinue     Medication Therapy Plan:    Pharmacy is requesting new scripts for the following medications without required information, (sig/ frequency/qty/etc)      Medication Reconciliation Completed: No     Comments: Pharmacies have been requesting medications for patients without required information, (sig, frequency, qty, etc.). In addition, requests are sent for medication(s) pt. are currently not taking, and medications patients have never taken.    We have spoken to the pharmacies about these request types and advised their teams previously that we are unable to assess these New Script requests and require all details for these requests. This is a known issue and has been reported.     Note composed:9:53 AM 05/03/2024

## 2024-05-03 NOTE — PROGRESS NOTES
Assessment and Plan:    1. Mixed hyperlipidemia  Follow up labs per Cardiologist. Discussed goodRx coupon with patient and provided paper Rx.  - ezetimibe (ZETIA) 10 mg tablet; Take 1 tablet (10 mg total) by mouth once daily.  Dispense: 90 tablet; Refill: 3    2. Chronic anxiety  - sertraline (ZOLOFT) 100 MG tablet; Take 1 tablet (100 mg total) by mouth once daily.  Dispense: 90 tablet; Refill: 3    3. Gastroesophageal reflux disease  - pantoprazole (PROTONIX) 40 MG tablet; Take 1 tablet (40 mg total) by mouth once daily.  Dispense: 90 tablet; Refill: 3    4. Essential hypertension  Not controlled. Increase amlodipine to 5 mg BID. Continue monitoring with digital HTN.   - amLODIPine (NORVASC) 5 MG tablet; Take 1 tablet (5 mg total) by mouth 2 (two) times daily.  Dispense: 180 tablet; Refill: 3    5. Coronary artery disease involving native coronary artery of native heart without angina pectoris  - amLODIPine (NORVASC) 5 MG tablet; Take 1 tablet (5 mg total) by mouth 2 (two) times daily.  Dispense: 180 tablet; Refill: 3    ______________________________________________________________________  Subjective:    Chief Complaint:  Follow up chronic medical conditions.    HPI:  Nilesh is a 68 y.o. year old male here to follow up chronic medical conditions.     CAD and HLD- Seeing Dr. Walton. On atorvastatin 80 but still has high LDL and high TG. Labs from yesterday still with ApoB and LPa pending. He had been prescribed zetia, but didn't fill this yet due to cost.    HTN- On digital HTN program. Has been high on recent home readings. On amlodipine 5, HCTZ 25, lisinopril 20 BID, and metoprolol 50 mg nightly.    PreDM- Last A1c down to 5.6 from 6.2.     Chronic anxiety- Takes sertraline and doing well with this.      GERD- Taking PPI and not having symptoms at this time.     Medications:  Current Outpatient Medications on File Prior to Visit   Medication Sig Dispense Refill    amLODIPine (NORVASC) 5 MG tablet Take 1  tablet (5 mg total) by mouth every evening. 90 tablet 3    atorvastatin (LIPITOR) 80 MG tablet Take 1 tablet (80 mg total) by mouth every evening. 90 tablet 3    hydroCHLOROthiazide (HYDRODIURIL) 25 MG tablet Take 1 tablet (25 mg total) by mouth once daily. 90 tablet 3    lisinopriL (PRINIVIL,ZESTRIL) 20 MG tablet Take 1 tablet (20 mg total) by mouth 2 (two) times daily. 180 tablet 3    metoprolol succinate (TOPROL-XL) 50 MG 24 hr tablet Take 1 tablet (50 mg total) by mouth every evening. 90 tablet 3    pantoprazole (PROTONIX) 40 MG tablet TAKE 1 TABLET ONCE DAILY 90 tablet 3    sertraline (ZOLOFT) 100 MG tablet TAKE 1 TABLET ONCE DAILY 90 tablet 3    tadalafiL (CIALIS) 20 MG Tab Take 1 tablet (20 mg total) by mouth once daily. 30 tablet 11    aspirin (ECOTRIN) 81 MG EC tablet Take 1 tablet (81 mg total) by mouth once daily.  0    ezetimibe (ZETIA) 10 mg tablet Take 1 tablet (10 mg total) by mouth once daily. (Patient not taking: Reported on 5/3/2024) 90 tablet 3    nitroGLYCERIN (NITROSTAT) 0.3 MG SL tablet Place 1 tablet (0.3 mg total) under the tongue every 5 (five) minutes as needed for Chest pain. (Patient not taking: Reported on 5/3/2024) 100 tablet 3     No current facility-administered medications on file prior to visit.       Review of Systems:  Review of Systems   Constitutional:  Negative for activity change and unexpected weight change.   HENT:  Negative for hearing loss, rhinorrhea and trouble swallowing.    Eyes:  Negative for discharge and visual disturbance.   Respiratory:  Negative for chest tightness and wheezing.    Cardiovascular:  Negative for chest pain and palpitations.   Gastrointestinal:  Negative for blood in stool, constipation, diarrhea and vomiting.   Endocrine: Negative for polydipsia and polyuria.   Genitourinary:  Negative for difficulty urinating, hematuria and urgency.   Musculoskeletal:  Negative for arthralgias, joint swelling and neck pain.   Neurological:  Negative for weakness  "and headaches.   Psychiatric/Behavioral:  Negative for confusion and dysphoric mood.        Past Medical History:  Past Medical History:   Diagnosis Date    Allergic rhinosinusitis     Anxiety     Back pain     with spasms    Diverticulitis     s/p partial colectomy    GERD (gastroesophageal reflux disease)     Hx of colonic polyps        Objective:    Vitals:  Vitals:    05/03/24 1454   BP: (!) 154/74   Pulse: 64   Resp: 16   Weight: 118.7 kg (261 lb 11 oz)   Height: 6' 1" (1.854 m)       Physical Exam  Vitals reviewed.   Constitutional:       General: He is not in acute distress.     Appearance: He is well-developed.   Eyes:      Conjunctiva/sclera: Conjunctivae normal.   Cardiovascular:      Rate and Rhythm: Normal rate and regular rhythm.   Pulmonary:      Effort: Pulmonary effort is normal. No respiratory distress.   Skin:     General: Skin is warm and dry.   Neurological:      Mental Status: He is alert and oriented to person, place, and time.   Psychiatric:         Mood and Affect: Mood normal.         Behavior: Behavior normal.         Data:  Last A1c down to 5.6    Flavia Lange MD  Internal Medicine    "

## 2024-05-03 NOTE — TELEPHONE ENCOUNTER
No care due was identified.  [facility] Embedded Care Due Messages. Reference number: [id number].   5/03/2024 9:46:30 AM CDT

## 2024-05-04 LAB — APO B SERPL-MCNC: 102 MG/DL

## 2024-05-06 ENCOUNTER — PATIENT MESSAGE (OUTPATIENT)
Dept: FAMILY MEDICINE | Facility: CLINIC | Age: 69
End: 2024-05-06
Payer: MEDICARE

## 2024-05-06 LAB — LPA SERPL-MCNC: 79 MG/DL (ref 0–30)

## 2024-05-10 ENCOUNTER — PATIENT MESSAGE (OUTPATIENT)
Dept: CARDIOLOGY | Facility: CLINIC | Age: 69
End: 2024-05-10
Payer: MEDICARE

## 2024-05-10 DIAGNOSIS — E78.2 MIXED HYPERLIPIDEMIA: Primary | Chronic | ICD-10-CM

## 2024-05-17 ENCOUNTER — OFFICE VISIT (OUTPATIENT)
Dept: FAMILY MEDICINE | Facility: CLINIC | Age: 69
End: 2024-05-17
Payer: MEDICARE

## 2024-05-17 VITALS
BODY MASS INDEX: 34.01 KG/M2 | HEIGHT: 73 IN | DIASTOLIC BLOOD PRESSURE: 68 MMHG | HEART RATE: 63 BPM | WEIGHT: 256.63 LBS | TEMPERATURE: 98 F | SYSTOLIC BLOOD PRESSURE: 146 MMHG | OXYGEN SATURATION: 95 %

## 2024-05-17 DIAGNOSIS — R05.1 ACUTE COUGH: Primary | ICD-10-CM

## 2024-05-17 DIAGNOSIS — I10 ESSENTIAL HYPERTENSION: ICD-10-CM

## 2024-05-17 DIAGNOSIS — J40 BRONCHITIS: ICD-10-CM

## 2024-05-17 LAB
CTP QC/QA: YES
CTP QC/QA: YES
POC MOLECULAR INFLUENZA A AGN: NEGATIVE
POC MOLECULAR INFLUENZA B AGN: NEGATIVE
SARS-COV-2 RDRP RESP QL NAA+PROBE: NEGATIVE

## 2024-05-17 PROCEDURE — 99214 OFFICE O/P EST MOD 30 MIN: CPT | Mod: PBBFAC,PO | Performed by: INTERNAL MEDICINE

## 2024-05-17 PROCEDURE — 99999 PR PBB SHADOW E&M-EST. PATIENT-LVL IV: CPT | Mod: PBBFAC,,, | Performed by: INTERNAL MEDICINE

## 2024-05-17 PROCEDURE — 87635 SARS-COV-2 COVID-19 AMP PRB: CPT | Mod: PBBFAC,PO | Performed by: INTERNAL MEDICINE

## 2024-05-17 PROCEDURE — 99999PBSHW: Mod: PBBFAC,,,

## 2024-05-17 PROCEDURE — 99214 OFFICE O/P EST MOD 30 MIN: CPT | Mod: S$PBB,,, | Performed by: INTERNAL MEDICINE

## 2024-05-17 PROCEDURE — 87502 INFLUENZA DNA AMP PROBE: CPT | Mod: PBBFAC,PO | Performed by: INTERNAL MEDICINE

## 2024-05-17 PROCEDURE — 99999PBSHW POCT INFLUENZA A/B MOLECULAR: Mod: PBBFAC,,,

## 2024-05-17 RX ORDER — BENZONATATE 100 MG/1
CAPSULE ORAL
Qty: 45 CAPSULE | Refills: 0 | Status: SHIPPED | OUTPATIENT
Start: 2024-05-17

## 2024-05-17 RX ORDER — CODEINE PHOSPHATE AND GUAIFENESIN 10; 100 MG/5ML; MG/5ML
5 SOLUTION ORAL EVERY 6 HOURS PRN
Qty: 180 ML | Refills: 0 | Status: SHIPPED | OUTPATIENT
Start: 2024-05-17 | End: 2024-05-27

## 2024-05-17 RX ORDER — METHYLPREDNISOLONE 4 MG/1
TABLET ORAL
Qty: 21 TABLET | Refills: 0 | Status: SHIPPED | OUTPATIENT
Start: 2024-05-17 | End: 2025-05-17

## 2024-05-17 RX ORDER — AZITHROMYCIN 250 MG/1
TABLET, FILM COATED ORAL
Qty: 6 TABLET | Refills: 0 | Status: SHIPPED | OUTPATIENT
Start: 2024-05-17

## 2024-05-17 NOTE — PROGRESS NOTES
Subjective:       Patient ID: Nilesh Sanchez is a 68 y.o. male.  Chief Complaint: Cough     HPI    C/o cough associated with chills and head ache for 2 weeks.  Severe body aches.  Clear drainage from nose with lots of congestion.  Some green sputum when coughs.  Covid neg 5 days ago at home.      HTN - mildly uncontrolled, but sick    Assessment:       1. Acute cough    2. Essential hypertension    3. Bronchitis        Plan:       Acute cough  -     POCT COVID-19 Rapid Screening  -     POCT Influenza A/B Molecular    Essential hypertension    Bronchitis  -     azithromycin (ZITHROMAX Z-LILLIAN) 250 MG tablet; Take 2 po day 1, then 1 po day 2 - 5  Dispense: 6 tablet; Refill: 0  -     benzonatate (TESSALON) 100 MG capsule; 1 - 2 po every 6 hours prn cough  Dispense: 45 capsule; Refill: 0  -     methylPREDNISolone (MEDROL DOSEPACK) 4 mg tablet; Take as directed  Dispense: 21 tablet; Refill: 0  -     guaiFENesin-codeine 100-10 mg/5 ml (GUAIFENESIN AC)  mg/5 mL syrup; Take 5 mLs by mouth every 6 (six) hours as needed for Cough.  Dispense: 180 mL; Refill: 0            Definitely viral and will have to run it's course.  No signs of pneumonia.  Will add Zpack incase secondary bacterial infection developing in bronchials.    Continue current management and monitor.  Other diagnoses were reviewed and found stable and will continue to monitor.  Counseled on regular exercise, maintenance of a healthy weight, balanced diet rich in fruits/vegetables and lean protein, and avoidance of unhealthy habits like smoking and excessive alcohol intake.   Also, counseled on importance of being compliant with medication, health appointments, diet and exercise.     No follow-ups on file.      Medication List with Changes/Refills   Current Medications    AMLODIPINE (NORVASC) 5 MG TABLET    Take 1 tablet (5 mg total) by mouth 2 (two) times daily.    ASPIRIN (ECOTRIN) 81 MG EC TABLET    Take 1 tablet (81 mg total) by mouth once daily.     ATORVASTATIN (LIPITOR) 80 MG TABLET    Take 1 tablet (80 mg total) by mouth every evening.    EZETIMIBE (ZETIA) 10 MG TABLET    Take 1 tablet (10 mg total) by mouth once daily.    HYDROCHLOROTHIAZIDE (HYDRODIURIL) 25 MG TABLET    Take 1 tablet (25 mg total) by mouth once daily.    LISINOPRIL (PRINIVIL,ZESTRIL) 20 MG TABLET    Take 1 tablet (20 mg total) by mouth 2 (two) times daily.    METOPROLOL SUCCINATE (TOPROL-XL) 50 MG 24 HR TABLET    Take 1 tablet (50 mg total) by mouth every evening.    NITROGLYCERIN (NITROSTAT) 0.3 MG SL TABLET    Place 1 tablet (0.3 mg total) under the tongue every 5 (five) minutes as needed for Chest pain.    PANTOPRAZOLE (PROTONIX) 40 MG TABLET    Take 1 tablet (40 mg total) by mouth once daily.    SERTRALINE (ZOLOFT) 100 MG TABLET    Take 1 tablet (100 mg total) by mouth once daily.    TADALAFIL (CIALIS) 20 MG TAB    Take 1 tablet (20 mg total) by mouth once daily.       BP Readings from Last 3 Encounters:   05/17/24 (!) 146/68   05/03/24 (!) 154/74   01/24/24 120/63     Hemoglobin A1C   Date Value Ref Range Status   06/09/2023 5.6 4.0 - 5.6 % Final     Comment:     ADA Screening Guidelines:  5.7-6.4%  Consistent with prediabetes  >or=6.5%  Consistent with diabetes    High levels of fetal hemoglobin interfere with the HbA1C  assay. Heterozygous hemoglobin variants (HbS, HgC, etc)do  not significantly interfere with this assay.   However, presence of multiple variants may affect accuracy.     01/06/2023 6.2 (H) 4.0 - 5.6 % Final     Comment:     ADA Screening Guidelines:  5.7-6.4%  Consistent with prediabetes  >or=6.5%  Consistent with diabetes    High levels of fetal hemoglobin interfere with the HbA1C  assay. Heterozygous hemoglobin variants (HbS, HgC, etc)do  not significantly interfere with this assay.   However, presence of multiple variants may affect accuracy.     12/09/2021 6.0 (H) 4.0 - 5.6 % Final     Comment:     ADA Screening Guidelines:  5.7-6.4%  Consistent with  prediabetes  >or=6.5%  Consistent with diabetes    High levels of fetal hemoglobin interfere with the HbA1C  assay. Heterozygous hemoglobin variants (HbS, HgC, etc)do  not significantly interfere with this assay.   However, presence of multiple variants may affect accuracy.       Lab Results   Component Value Date    TSH 1.649 05/02/2024     Lab Results   Component Value Date    LDLCALC 115.0 05/02/2024    LDLCALC 99.6 06/14/2023    LDLCALC 116.6 01/06/2023     Lab Results   Component Value Date    TRIG 225 (H) 05/02/2024    TRIG 272 (H) 06/14/2023    TRIG 312 (H) 01/06/2023     Wt Readings from Last 3 Encounters:   05/17/24 116.4 kg (256 lb 9.9 oz)   05/03/24 118.7 kg (261 lb 11 oz)   01/24/24 116 kg (255 lb 11.7 oz)     Lab Results   Component Value Date    HGB 13.0 (L) 03/06/2023    HCT 40.3 03/06/2023    WBC 5.46 03/06/2023    ALT 28 05/02/2024    AST 26 05/02/2024     05/02/2024    K 4.8 05/02/2024    CREATININE 1.0 05/02/2024    PSA 0.47 05/02/2024           Review of Systems        Objective:      Vitals:    05/17/24 0835   BP: (!) 146/68   Pulse: 63   Temp: 97.5 °F (36.4 °C)     Physical Exam  Pulmonary:      Effort: Pulmonary effort is normal.      Breath sounds: Normal breath sounds.

## 2024-06-21 ENCOUNTER — LAB VISIT (OUTPATIENT)
Dept: LAB | Facility: HOSPITAL | Age: 69
End: 2024-06-21
Attending: INTERNAL MEDICINE
Payer: MEDICARE

## 2024-06-21 DIAGNOSIS — E78.2 MIXED HYPERLIPIDEMIA: Chronic | ICD-10-CM

## 2024-06-21 LAB
CHOLEST SERPL-MCNC: 147 MG/DL (ref 120–199)
CHOLEST/HDLC SERPL: 3.1 {RATIO} (ref 2–5)
HDLC SERPL-MCNC: 47 MG/DL (ref 40–75)
HDLC SERPL: 32 % (ref 20–50)
LDLC SERPL CALC-MCNC: 65.8 MG/DL (ref 63–159)
NONHDLC SERPL-MCNC: 100 MG/DL
TRIGL SERPL-MCNC: 171 MG/DL (ref 30–150)

## 2024-06-21 PROCEDURE — 80061 LIPID PANEL: CPT | Performed by: INTERNAL MEDICINE

## 2024-06-24 LAB — APO B SERPL-MCNC: 80 MG/DL

## 2024-07-23 ENCOUNTER — TELEPHONE (OUTPATIENT)
Dept: CARDIOLOGY | Facility: CLINIC | Age: 69
End: 2024-07-23
Payer: MEDICARE

## 2024-07-23 NOTE — TELEPHONE ENCOUNTER
Courtesy call was made in [facility] for appt. on 7/24 no answer left message.JEANIE

## 2024-07-24 ENCOUNTER — OFFICE VISIT (OUTPATIENT)
Dept: CARDIOLOGY | Facility: CLINIC | Age: 69
End: 2024-07-24
Payer: MEDICARE

## 2024-07-24 VITALS
SYSTOLIC BLOOD PRESSURE: 167 MMHG | DIASTOLIC BLOOD PRESSURE: 90 MMHG | WEIGHT: 259.69 LBS | BODY MASS INDEX: 34.26 KG/M2 | HEART RATE: 61 BPM

## 2024-07-24 DIAGNOSIS — E78.2 MIXED HYPERLIPIDEMIA: Chronic | ICD-10-CM

## 2024-07-24 DIAGNOSIS — I10 ESSENTIAL HYPERTENSION: Chronic | ICD-10-CM

## 2024-07-24 DIAGNOSIS — E78.41 ELEVATED LIPOPROTEIN(A): ICD-10-CM

## 2024-07-24 DIAGNOSIS — I25.10 CORONARY ARTERY DISEASE INVOLVING NATIVE CORONARY ARTERY OF NATIVE HEART WITHOUT ANGINA PECTORIS: Primary | Chronic | ICD-10-CM

## 2024-07-24 PROCEDURE — 99213 OFFICE O/P EST LOW 20 MIN: CPT | Mod: PBBFAC | Performed by: INTERNAL MEDICINE

## 2024-07-24 PROCEDURE — 99214 OFFICE O/P EST MOD 30 MIN: CPT | Mod: S$PBB,,, | Performed by: INTERNAL MEDICINE

## 2024-07-24 PROCEDURE — 99999 PR PBB SHADOW E&M-EST. PATIENT-LVL III: CPT | Mod: PBBFAC,,, | Performed by: INTERNAL MEDICINE

## 2024-07-24 NOTE — PROGRESS NOTES
Subjective:   07/24/2024     Patient ID:  Nilesh Sanchez is a 69 y.o. male who presents for evaulation of 6 month Follow up      Comes in for follow-up.  Working on diet, weight loss plateau.  A1c and diabetes better.  Has coronary artery disease, but currently asymptomatic.    Blood pressure very well controlled at home, 120/70.  Now in digital Medicine program    Hypercholesterolemia on now high-dose statin therapy plus ezetimibe, has high triglycerides (improving), high LDL.  LDL goal less than 70 mg/dL, close to 50.  In 6/23, total cholesterol 205, HDL 51, LDL 99.6 and triglycerides 272, down from 312.  Triglycerides should be reduced with diet and exercise.   Also noted to have an elevated lipoprotein small a.             Recent echo:   · The left ventricle is normal in size with mild eccentric hypertrophy and normal systolic function.  · The estimated ejection fraction is 65%.  · Normal left ventricular diastolic function.  · Normal right ventricular size with normal right ventricular systolic function.  · Mild aortic regurgitation.  · Normal central venous pressure (3 mmHg).  · The estimated PA systolic pressure is 40 mmHg.     Recent cardiac catheterization:      ·  The estimated blood loss was <50 mL.  ·  There was single vessel coronary artery disease, moderate disease was present in the LAD           Previous Assessment and Plan:    Coronary artery disease involving native coronary artery of native heart without angina pectoris  Comments:  Asymptomatic    Chronic diastolic heart failure  Comments:  Compensated  Orders:  -     Echo; Future; Expected date: 07/24/2024    Severe obesity (BMI 35.0-39.9) with comorbidity    Mixed hyperlipidemia  Comments:  Add ezetimibe  Weight loss for triglycerides  Check lab  Orders:  -     ezetimibe (ZETIA) 10 mg tablet; Take 1 tablet (10 mg total) by mouth once daily.  Dispense: 90 tablet; Refill: 3  -     Lipoprotein A (LPA); Future; Expected date: 02/24/2024  -      Lipid Panel; Future; Expected date: 02/24/2024  -     Apolipoprotein B; Future; Expected date: 02/24/2024    Essential hypertension  Comments:  Good with digital Medicine  Orders:  -     Comprehensive Metabolic Panel; Future; Expected date: 02/24/2024               Follow up in about 6 months (around 7/24/2024).      Recent cardiac catheterization:   Coronary Findings  Diagnostic  Dominance: Right  Left Main  Normal length, no significant stenosis, luminal irregularity noted  Left Anterior Descending  The LAD gives origin to a moderate-sized 1st diagonal branch, luminal irregularity is noted in that vessel. The LAD then has an area of 60% stenosis which is fairly long. The distal LAD gives origin to a small diagonal branch and at the apex small recurrent lateral and inferior branches.  Ramus Intermedius  Absent  Left Circumflex  Generalized luminal irregularity noted. Gives origin to a large 1st marginal branch and a small posterolateral branch.  Right Coronary Artery  The right coronary artery is large giving origin to a large PDA, small moderate and to further small right posterolateral branches. Luminal irregularity is present.    No interventions have been documented.    Recommendations    · Routine post-cath care.  · Maximize medical management.  · ASA 81mg.  · Weight loss.  · Statin therapy.      Patient had initially presented with increasing shortness of breath and chest pain, hydrochlorothiazide had been discontinued, he appeared to be hypertensive and volume overloaded.  He feels better now with resumption of hydrochlorothiazide therapy along with therapy with beta-blocker and calcium channel blockers.    He will be continued on medical management, hypertension be controlled with beta-blocker, ACE inhibitor, hydrochlorothiazide and calcium channel blocker.      High-dose statin therapy should be continued, LDL goal less than 70, approximately 50.  Atorvastatin will be increased to 80 mg nightly.      Prior  note:    Comes in today for evaluation of increasing shortness of breath and chest discomfort.  Yesterday, he had chest tightness lasting 3 hours with associated shortness of breath.  Shortness of breath is worse with activity and he also had exertional chest pain.  That has been present for last 48 hours.  He is also had fairly severe hypertension over the last several weeks with medication changes.  He was switched from lisinopril HCT to valsartan, but became dizzy.  That was discontinued and he was restarted on lisinopril at a higher dose, 20 mg twice a day, but he did not take the hydrochlorothiazide in his been off of hydrochlorothiazide last 2 weeks.  He only resume that yesterday and took another dose today.    He does not have a history of coronary artery disease, cardiac catheterization 20 years ago did not show severe blockages.  Always has an abnormal EKG suggestive of anterior wall infarct, this has never been present though.    Hypertension is present and continues, his blood pressure today lying is 172/94, standing 159/94, pulse 58.  Other antihypertensive medications include metoprolol.      Hyper lipidemia is present.  Recently, he was found have a total cholesterol 229, HDL 50, , triglycerides 312.      He does have prediabetes.        Past Medical History:   Diagnosis Date    Allergic rhinosinusitis     Anxiety     Back pain     with spasms    Diverticulitis     s/p partial colectomy    GERD (gastroesophageal reflux disease)     Hx of colonic polyps        Review of patient's allergies indicates:  No Known Allergies      Current Outpatient Medications:     amLODIPine (NORVASC) 5 MG tablet, Take 1 tablet (5 mg total) by mouth 2 (two) times daily., Disp: 180 tablet, Rfl: 3    atorvastatin (LIPITOR) 80 MG tablet, Take 1 tablet (80 mg total) by mouth every evening., Disp: 90 tablet, Rfl: 3    azithromycin (ZITHROMAX Z-LILLIAN) 250 MG tablet, Take 2 po day 1, then 1 po day 2 - 5, Disp: 6 tablet, Rfl:  0    benzonatate (TESSALON) 100 MG capsule, 1 - 2 po every 6 hours prn cough, Disp: 45 capsule, Rfl: 0    ezetimibe (ZETIA) 10 mg tablet, Take 1 tablet (10 mg total) by mouth once daily., Disp: 90 tablet, Rfl: 3    hydroCHLOROthiazide (HYDRODIURIL) 25 MG tablet, Take 1 tablet (25 mg total) by mouth once daily., Disp: 90 tablet, Rfl: 3    lisinopriL (PRINIVIL,ZESTRIL) 20 MG tablet, Take 1 tablet (20 mg total) by mouth 2 (two) times daily., Disp: 180 tablet, Rfl: 3    methylPREDNISolone (MEDROL DOSEPACK) 4 mg tablet, Take as directed, Disp: 21 tablet, Rfl: 0    metoprolol succinate (TOPROL-XL) 50 MG 24 hr tablet, Take 1 tablet (50 mg total) by mouth every evening., Disp: 90 tablet, Rfl: 3    nitroGLYCERIN (NITROSTAT) 0.3 MG SL tablet, Place 1 tablet (0.3 mg total) under the tongue every 5 (five) minutes as needed for Chest pain., Disp: 100 tablet, Rfl: 3    pantoprazole (PROTONIX) 40 MG tablet, Take 1 tablet (40 mg total) by mouth once daily., Disp: 90 tablet, Rfl: 3    sertraline (ZOLOFT) 100 MG tablet, Take 1 tablet (100 mg total) by mouth once daily., Disp: 90 tablet, Rfl: 3    tadalafiL (CIALIS) 20 MG Tab, Take 1 tablet (20 mg total) by mouth once daily., Disp: 30 tablet, Rfl: 11    aspirin (ECOTRIN) 81 MG EC tablet, Take 1 tablet (81 mg total) by mouth once daily., Disp: , Rfl: 0     Objective:   Review of Systems   Cardiovascular:  Negative for chest pain, claudication, cyanosis, dyspnea on exertion, irregular heartbeat, leg swelling, near-syncope, orthopnea, palpitations, paroxysmal nocturnal dyspnea and syncope.         Vitals:    07/24/24 1325   BP: (!) 167/90   Pulse: 61           Wt Readings from Last 3 Encounters:   07/24/24 117.8 kg (259 lb 11.2 oz)   05/17/24 116.4 kg (256 lb 9.9 oz)   05/03/24 118.7 kg (261 lb 11 oz)     Temp Readings from Last 3 Encounters:   05/17/24 97.5 °F (36.4 °C) (Oral)   03/06/23 98 °F (36.7 °C) (Temporal)   09/06/22 97.3 °F (36.3 °C)     BP Readings from Last 3 Encounters:    07/24/24 (!) 167/90   05/17/24 (!) 146/68   05/03/24 (!) 154/74     Pulse Readings from Last 3 Encounters:   07/24/24 61   05/17/24 63   05/03/24 64             Physical Exam  Vitals reviewed.   Constitutional:       General: He is not in acute distress.     Appearance: He is well-developed.   HENT:      Head: Normocephalic and atraumatic.      Nose: Nose normal.   Eyes:      Conjunctiva/sclera: Conjunctivae normal.      Pupils: Pupils are equal, round, and reactive to light.   Neck:      Vascular: No carotid bruit or JVD.   Cardiovascular:      Rate and Rhythm: Normal rate and regular rhythm.      Pulses: Normal pulses and intact distal pulses.      Heart sounds: Normal heart sounds. No murmur heard.     No friction rub. No gallop.   Pulmonary:      Effort: Pulmonary effort is normal. No respiratory distress.      Breath sounds: Normal breath sounds. No wheezing or rales.   Chest:      Chest wall: No tenderness.   Abdominal:      General: Bowel sounds are normal. There is no distension.      Palpations: Abdomen is soft.      Tenderness: There is no abdominal tenderness.   Musculoskeletal:         General: No tenderness or deformity. Normal range of motion.      Cervical back: Normal range of motion and neck supple.      Right lower leg: No edema.      Left lower leg: No edema.   Skin:     General: Skin is warm and dry.      Findings: No erythema or rash.   Neurological:      Mental Status: He is alert and oriented to person, place, and time.      Cranial Nerves: No cranial nerve deficit.      Motor: No abnormal muscle tone.      Coordination: Coordination normal.   Psychiatric:         Behavior: Behavior normal.         Thought Content: Thought content normal.         Judgment: Judgment normal.           Lab Results   Component Value Date    CHOL 147 06/21/2024    CHOL 211 (H) 05/02/2024    CHOL 205 (H) 06/14/2023     Lab Results   Component Value Date    HDL 47 06/21/2024    HDL 51 05/02/2024    HDL 51  06/14/2023     Lab Results   Component Value Date    LDLCALC 65.8 06/21/2024    LDLCALC 115.0 05/02/2024    LDLCALC 99.6 06/14/2023     Lab Results   Component Value Date    ALT 28 05/02/2024    AST 26 05/02/2024    AST 33 06/09/2023    AST 34 01/06/2023     Lab Results   Component Value Date    CREATININE 1.0 05/02/2024    BUN 26 (H) 05/02/2024     05/02/2024    K 4.8 05/02/2024    CO2 30 (H) 05/02/2024    CO2 28 06/14/2023    CO2 31 (H) 06/09/2023     Lab Results   Component Value Date    HGB 13.0 (L) 03/06/2023    HCT 40.3 03/06/2023    HCT 44.1 05/22/2020    HCT 40.4 10/19/2019                 Lab Results   Component Value Date    CHOL 147 06/21/2024    CHOL 211 (H) 05/02/2024    CHOL 205 (H) 06/14/2023     Lab Results   Component Value Date    HDL 47 06/21/2024    HDL 51 05/02/2024    HDL 51 06/14/2023     Lab Results   Component Value Date    LDLCALC 65.8 06/21/2024    LDLCALC 115.0 05/02/2024    LDLCALC 99.6 06/14/2023     Lab Results   Component Value Date    ALT 28 05/02/2024    AST 26 05/02/2024    AST 33 06/09/2023    AST 34 01/06/2023     Lab Results   Component Value Date    CREATININE 1.0 05/02/2024    BUN 26 (H) 05/02/2024     05/02/2024    K 4.8 05/02/2024    CO2 30 (H) 05/02/2024    CO2 28 06/14/2023    CO2 31 (H) 06/09/2023     Lab Results   Component Value Date    HGB 13.0 (L) 03/06/2023    HCT 40.3 03/06/2023    HCT 44.1 05/22/2020    HCT 40.4 10/19/2019                      Lab Results   Component Value Date     05/02/2024    K 4.8 05/02/2024     05/02/2024    CO2 30 (H) 05/02/2024    BUN 26 (H) 05/02/2024    CREATININE 1.0 05/02/2024     05/02/2024    HGBA1C 5.6 06/09/2023    AST 26 05/02/2024    ALT 28 05/02/2024    ALBUMIN 4.0 05/02/2024    PROT 6.8 05/02/2024    BILITOT 0.3 05/02/2024    WBC 5.46 03/06/2023    HGB 13.0 (L) 03/06/2023    HCT 40.3 03/06/2023    MCV 96 03/06/2023     03/06/2023    INR 1.0 08/10/2018    PSA 0.47 05/02/2024    TSH 1.649  05/02/2024    CHOL 147 06/21/2024    HDL 47 06/21/2024    LDLCALC 65.8 06/21/2024    LDLCALC 85 04/19/2017    TRIG 171 (H) 06/21/2024            Assessment and Plan:     Coronary artery disease involving native coronary artery of native heart without angina pectoris  Comments:  without angina    Essential hypertension  Comments:  well controlled in digital Medicine    Mixed hyperlipidemia  Comments:  LDL markedly improved, triglycerides remain mildly elevated, continue to work with diet and exercise, diabetes control    Elevated lipoprotein(a)  Comments:  no specific treatment for              Patient would benefit from G LP 1 receptor agonist therapy from many standpoints, his triglycerides would improve, blood pressure would improve unless medication.  He will ask his PCP.    Follow up in about 1 year (around 7/24/2025).          No future appointments.

## 2024-07-26 ENCOUNTER — HOSPITAL ENCOUNTER (EMERGENCY)
Facility: HOSPITAL | Age: 69
Discharge: HOME | End: 2024-07-26
Attending: STUDENT IN AN ORGANIZED HEALTH CARE EDUCATION/TRAINING PROGRAM
Payer: MEDICARE

## 2024-07-26 ENCOUNTER — PATIENT MESSAGE (OUTPATIENT)
Dept: FAMILY MEDICINE | Facility: CLINIC | Age: 69
End: 2024-07-26
Payer: MEDICARE

## 2024-07-26 VITALS
BODY MASS INDEX: 34.33 KG/M2 | TEMPERATURE: 98 F | RESPIRATION RATE: 18 BRPM | HEART RATE: 62 BPM | WEIGHT: 259 LBS | OXYGEN SATURATION: 97 % | SYSTOLIC BLOOD PRESSURE: 134 MMHG | DIASTOLIC BLOOD PRESSURE: 76 MMHG | HEIGHT: 73 IN

## 2024-07-26 DIAGNOSIS — M25.579 ANKLE PAIN: ICD-10-CM

## 2024-07-26 DIAGNOSIS — M79.673 FOOT PAIN: ICD-10-CM

## 2024-07-26 DIAGNOSIS — M25.571 ACUTE RIGHT ANKLE PAIN: Primary | ICD-10-CM

## 2024-07-26 PROCEDURE — 63600175 PHARM REV CODE 636 W HCPCS: Performed by: PHYSICIAN ASSISTANT

## 2024-07-26 PROCEDURE — 96372 THER/PROPH/DIAG INJ SC/IM: CPT | Performed by: PHYSICIAN ASSISTANT

## 2024-07-26 PROCEDURE — 99284 EMERGENCY DEPT VISIT MOD MDM: CPT | Mod: 25

## 2024-07-26 RX ORDER — KETOROLAC TROMETHAMINE 30 MG/ML
10 INJECTION, SOLUTION INTRAMUSCULAR; INTRAVENOUS
Status: COMPLETED | OUTPATIENT
Start: 2024-07-26 | End: 2024-07-26

## 2024-07-26 RX ADMIN — KETOROLAC TROMETHAMINE 10 MG: 30 INJECTION, SOLUTION INTRAMUSCULAR; INTRAVENOUS at 01:07

## 2024-07-26 NOTE — PROVIDER PROGRESS NOTES - EMERGENCY DEPT.
Encounter Date: 7/26/2024    ED Physician Progress Notes          Medical screening exam completed.  I have conducted a focused provider triage encounter, findings are as follows:    Brief history of present illness:  ankle injury, ?ligament injury    There were no vitals filed for this visit.    Pertinent physical exam:  tender to right ankle foot    Brief workup plan:  xray to foot and ankle    Preliminary workup initiated; this workup will be continued and followed by the physician or advanced practice provider that is assigned to the patient when roomed.

## 2024-07-26 NOTE — DISCHARGE INSTRUCTIONS
Take ibuprofen 600 mg every 12 hours as needed with food for anti-inflammatory relief. You kidney function is normal, but you should use NSAIDs sparingly as you get older.  You were given toradol IM injection in the ER.  You can take acetaminophen/tylenol 650 mg every 6 hours or 1000 mg every 8 hours for added relief.  Apply ice to the area for 10-20 minutes every 4 hours. You can apply heat 2 days after for the same duration and frequency.  Compress with ace wrap. Consider ankle brace.   Elevate ankle as much as possible to help with swelling or pain.   Start early ankle work outs to improve function as soon as possible.  Follow up with Sports Medicine (ortho) if your symptoms do not improve in 2 weeks.  Return to the ER for new or worsening symptoms.  No future appointments.  Imaging Results              X-Ray Ankle Complete Right (Final result)  Result time 07/26/24 13:39:34      Final result by Jorge Cotton MD (07/26/24 13:39:34)                   Impression:      See above      Electronically signed by: Jorge Cotton MD  Date:    07/26/2024  Time:    13:39               Narrative:    EXAMINATION:  XR ANKLE COMPLETE 3 VIEW RIGHT    CLINICAL HISTORY:  Pain in unspecified ankle and joints of unspecified foot    TECHNIQUE:  AP, lateral, and oblique images of the right ankle were performed.    COMPARISON:  None    FINDINGS:  No fracture or dislocation.  No bone destruction identified                                       X-Ray Foot Complete Right (Final result)  Result time 07/26/24 13:51:29      Final result by Maxime Nava MD (07/26/24 13:51:29)                   Impression:      No acute displaced fracture-dislocation identified.      Electronically signed by: Maxime Nava MD  Date:    07/26/2024  Time:    13:51               Narrative:    EXAMINATION:  XR FOOT COMPLETE 3 VIEW RIGHT    CLINICAL HISTORY:  . Pain in unspecified foot    TECHNIQUE:  AP, lateral, and oblique views of the right foot were  performed.    COMPARISON:  Right ankle series earlier same date    FINDINGS:  Bones are well mineralized.  Slight hallux valgus configuration.  Well corticated Bipartite sesamoid at the level of the 1st metatarsal head.  No displaced fracture, dislocation or destructive osseous process.  Lisfranc articulation is congruent.  Baseline minimal DJD at the 1st MTP joint.  Several scattered small nonspecific soft tissue calcifications.  No subcutaneous emphysema or radiopaque foreign body.

## 2024-07-26 NOTE — ED NOTES
Nilesh Sanchez, a 69 y.o. male presents to the ED w/ complaint of ankle pain x 3 days    Triage note:  Chief Complaint   Patient presents with    Ankle Injury     Ankle pain for 3 d     Review of patient's allergies indicates:  No Known Allergies  Past Medical History:   Diagnosis Date    Allergic rhinosinusitis     Anxiety     Back pain     with spasms    Diverticulitis     s/p partial colectomy    GERD (gastroesophageal reflux disease)     Hx of colonic polyps      LOC: Patient name and date of birth verified. The patient is awake, alert and aware of environment with an appropriate affect, the patient is oriented x 3 and speaking appropriately.   APPEARANCE: Patient resting comfortably, patient is clean and well groomed, patient's clothing is properly fastened.  SKIN: The skin is warm and dry, color consistent with ethnicity, patient has normal skin turgor and moist mucus membranes, skin intact, no breakdown or bruising noted.  MUSCULOSKELETAL: right ankle pain   RESPIRATORY: Respirations are spontaneous, patient has a normal effort and rate, no accessory muscle use noted.  CARDIAC: Patient has a normal rate and rhythm, no periphreal edema noted, capillary refill < 3 seconds.  ABDOMEN: Soft and non tender to palpation, no distention noted. Bowel sounds present in all four quadrants.  NEUROLOGIC: Eyes open spontaneously, behavior appropriate to situation, follows commands, facial expression symmetrical, bilateral hand grasp equal and even, purposeful motor response noted, normal sensation in all extremities when touched with a finger.

## 2024-07-26 NOTE — ED PROVIDER NOTES
Encounter Date: 7/26/2024       History     Chief Complaint   Patient presents with    Ankle Injury     Ankle pain for 3 d     12:18 PM  Patient is a 69-year-old male with a history of GERD, diverticulosis, back pain, anxiety, HTN, who [place] [facility] [place] of right ankle pain.    Patient states that he has had pain for 3 days.  Has been ambulatory but with a limp.  He states [place] while [place] the airport, he had acute pain and felt a pop while just walking.  Most of his pain is to his posterior lateral malleolus which radiates towards his 4th and 5th toe.  He does a lot of [employer] work, but otherwise denies any recent injury or trauma.  He denies history of gouty arthritis or pain in the right ankle, fever, chills, paresthesias.  He has had Advil on other days for pain relief without resolution.        Review of patient's allergies indicates:  No Known Allergies  Past Medical History:   Diagnosis Date    Allergic rhinosinusitis     Anxiety     Back pain     with spasms    Diverticulitis     s/p partial colectomy    GERD (gastroesophageal reflux disease)     Hx of colonic polyps      Past Surgical History:   Procedure Laterality Date    ARTHROSCOPIC CHONDROPLASTY OF KNEE JOINT Left 08/22/2018    Procedure: ARTHROSCOPY, KNEE, WITH CHONDROPLASTY;  Surgeon: Buster Bansal MD;  Location: [facility];  Service: Orthopedics;  Laterality: Left;    COLECTOMY      18in of sigmoid colon removed, about 2014, [facility]    COLONOSCOPY      COLONOSCOPY N/A 05/13/2022    Procedure: COLONOSCOPY;  Surgeon: Polo Diop MD;  Location: [facility] ENDO;  Service: Endoscopy;  Laterality: N/A;    CORONARY ANGIOGRAPHY N/A 3/6/2023    Procedure: ANGIOGRAM, CORONARY ARTERY;  Surgeon: Uche Walton Jr., MD;  Location: [facility] CATH LAB;  Service: Cardiology;  Laterality: N/A;  low bleeding risk 0.9%    ESOPHAGOGASTRODUODENOSCOPY      KNEE ARTHROSCOPY W/ MENISCECTOMY Left 08/22/2018    Procedure: ARTHROSCOPY, KNEE, WITH  MENISCECTOMY PARTIAL MEDIAL;  Surgeon: Buster Bansal MD;  Location: [facility];  Service: Orthopedics;  Laterality: Left;  femoral     LUMBAR DISC SURGERY  12/07/2017    L4/5    SPINE SURGERY      UPPER GASTROINTESTINAL ENDOSCOPY       Family History   Problem Relation Name Age of Onset    Heart disease Mother mom     Arthritis Mother mom     Early death Mother mom     Hypertension Mother mom     Heart disease Father      Heart disease Sister      Atrial fibrillation Sister      Heart disease Brother       Social History     Tobacco Use    Smoking status: Never    Smokeless tobacco: Never   Substance Use Topics    Alcohol use: Yes     Alcohol/week: 2.0 standard drinks of alcohol     Types: 2 Glasses of wine per week     Comment: wine daily    Drug use: Never     Review of Systems   Constitutional:  Positive for activity change. Negative for fever.   HENT:  Negative for sore throat.    Respiratory:  Negative for shortness of breath.    Cardiovascular:  Negative for chest pain.   Gastrointestinal:  Negative for nausea.   Genitourinary:  Negative for dysuria.   Musculoskeletal:  Positive for arthralgias and gait problem. Negative for back pain.   Skin:  Negative for rash.   Neurological:  Negative for weakness and numbness.   Hematological:  Does not bruise/bleed easily.       Physical Exam     Initial Vitals [07/26/24 1127]   BP Pulse Resp Temp SpO2   (!) 192/74 64 18 97.9 °F (36.6 °C) 96 %      MAP       --         Physical Exam    Vitals reviewed.  Constitutional: He appears well-developed and well-nourished. He is not diaphoretic. He is cooperative.  Non-toxic appearance. He does not have a sickly appearance. He does not appear ill. No distress.   HENT:   Head: Normocephalic and atraumatic.   Nose: Nose normal.   Mouth/Throat: No trismus in the jaw.   Eyes: Conjunctivae and EOM are normal.   Neck:   Normal range of motion.  Cardiovascular:  Normal rate.           Pulmonary/Chest: No accessory muscle usage. No  tachypnea. No respiratory distress.   Abdominal: He exhibits no distension.   Musculoskeletal:         General: Normal range of motion.      Cervical back: Normal range of motion.      Right knee: Normal.      Right ankle: No swelling, deformity, ecchymosis or lacerations. Tenderness present over the lateral malleolus. Normal range of motion.      Right foot: Normal range of motion. No bony tenderness.     Neurological: He is alert. He has normal strength.   Skin: Skin is dry. Capillary refill takes less than 2 seconds. No abrasion, no bruising, no burn, no ecchymosis, no lesion and no abscess noted. No erythema. No pallor.         ED Course   Procedures  Labs Reviewed - No data to display       Imaging Results              X-Ray Ankle Complete Right (Final result)  Result time 07/26/24 13:39:34      Final result by Jorge Cotton MD (07/26/24 13:39:34)                   Impression:      See above      Electronically signed by: Jorge Cotton MD  Date:    07/26/2024  Time:    13:39               Narrative:    EXAMINATION:  XR ANKLE COMPLETE 3 VIEW RIGHT    CLINICAL HISTORY:  Pain in unspecified ankle and joints of unspecified foot    TECHNIQUE:  AP, lateral, and oblique images of the right ankle were performed.    COMPARISON:  None    FINDINGS:  No fracture or dislocation.  No bone destruction identified                                       X-Ray Foot Complete Right (Final result)  Result time 07/26/24 13:51:29      Final result by Maxime Nava MD (07/26/24 13:51:29)                   Impression:      No acute displaced fracture-dislocation identified.      Electronically signed by: Maxime Nava MD  Date:    07/26/2024  Time:    13:51               Narrative:    EXAMINATION:  XR FOOT COMPLETE 3 VIEW RIGHT    CLINICAL HISTORY:  . Pain in unspecified foot    TECHNIQUE:  AP, lateral, and oblique views of the right foot were performed.    COMPARISON:  Right ankle series earlier same date    FINDINGS:  Bones are well  mineralized.  Slight hallux valgus configuration.  Well corticated Bipartite sesamoid at the level of the 1st metatarsal head.  No displaced fracture, dislocation or destructive osseous process.  Lisfranc articulation is congruent.  Baseline minimal DJD at the 1st MTP joint.  Several scattered small nonspecific soft tissue calcifications.  No subcutaneous emphysema or radiopaque foreign body.                                       Medications   ketorolac injection 9.999 mg (9.999 mg Intramuscular Given 7/26/24 1331)     Medical Decision Making  Differential diagnosis includes but is not limited to sprain, strain, avulsion fracture, tendinopathy, other ligamentous injury, arthritis, other inflammatory arthritis such as gouty arthritis. Great DP pulse. Doubt acute ischemic limb. Intact passive and active ROM. Doubt septic arthritis.     Will obtain xrays and reassess.  He has no history of CKD.  His last creatinine was normal. One dose of toradol IM is ok for anti-inflammatory effects and acute pain.    Amount and/or Complexity of Data Reviewed  Radiology:  Decision-making details documented in ED Course.    Risk  Prescription drug management.               ED Course as of 07/26/24 1429   Fri Jul 26, 2024   1205 BP(!): 192/74 [CL]   1205 Temp: 97.9 °F (36.6 °C) [CL]   1205 Pulse: 64 [CL]   1205 Resp: 18 [CL]   1206 SpO2: 96 % [CL]   1213 Last Cr 1.0. [CL]   1356 X-Ray Foot Complete Right  No acute displaced fracture-dislocation identified. [CL]   1356 X-Ray Ankle Complete Right  No fracture or dislocation.  No bone destruction identified. [CL]   1413 Patient updated with results.  Will treat conservatively.  RICE. OTC meds.  I personally Ace wrapped his right ankle.  Will give boot and crutches.  Follow up with sports Medicine.  Referral placed.  All of his questions were answered.  Patient comfortable with plan and stable for discharge. [CL]      ED Course User Index  [CL] Libia Gates PA-C                            Clinical Impression:  Final diagnoses:  [M25.579] Ankle pain  [M79.673] Foot pain  [M25.571] Acute right ankle pain (Primary)          ED Disposition Condition    Discharge Stable          ED Prescriptions    None       Follow-up Information       Follow up With Specialties Details Why Contact Info    [facility] Sports Medicine Schedule an appointment as soon as possible for a visit   [address]  [facility] [address]  [phone]    [facility] - Emergency Dept Emergency Medicine  If symptoms worsen [address] [facility] [id number] [phone]          Future Appointments   Date Time Provider Department Center   7/30/2024  1:00 PM Stone Krishnamurthy MD [facility] Libia Knox PA-C  07/26/24 1435

## 2024-07-29 ENCOUNTER — OFFICE VISIT (OUTPATIENT)
Dept: ORTHOPEDICS | Facility: CLINIC | Age: 69
End: 2024-07-29
Payer: MEDICARE

## 2024-07-29 VITALS — HEIGHT: 73 IN | WEIGHT: 259.06 LBS | BODY MASS INDEX: 34.33 KG/M2 | RESPIRATION RATE: 18 BRPM

## 2024-07-29 DIAGNOSIS — S86.311A PERONEAL TENDON RUPTURE, RIGHT, INITIAL ENCOUNTER: Primary | ICD-10-CM

## 2024-07-29 DIAGNOSIS — S86.311A PERONEAL TENDON TEAR, RIGHT, INITIAL ENCOUNTER: Primary | ICD-10-CM

## 2024-07-29 PROCEDURE — 99213 OFFICE O/P EST LOW 20 MIN: CPT | Mod: PBBFAC,PO | Performed by: ORTHOPAEDIC SURGERY

## 2024-07-29 PROCEDURE — 99999 PR PBB SHADOW E&M-EST. PATIENT-LVL III: CPT | Mod: PBBFAC,,, | Performed by: ORTHOPAEDIC SURGERY

## 2024-07-29 NOTE — PROGRESS NOTES
Past Medical History:   Diagnosis Date    Allergic rhinosinusitis     Anxiety     Back pain     with spasms    Diverticulitis     s/p partial colectomy    GERD (gastroesophageal reflux disease)     Hx of colonic polyps        Past Surgical History:   Procedure Laterality Date    ARTHROSCOPIC CHONDROPLASTY OF KNEE JOINT Left 08/22/2018    Procedure: ARTHROSCOPY, KNEE, WITH CHONDROPLASTY;  Surgeon: Buster Bansal MD;  Location: [facility];  Service: Orthopedics;  Laterality: Left;    COLECTOMY      18in of sigmoid colon removed, about 2014, [facility]    COLONOSCOPY      COLONOSCOPY N/A 05/13/2022    Procedure: COLONOSCOPY;  Surgeon: Polo Diop MD;  Location[facility];  Service: Endoscopy;  Laterality: N/A;    CORONARY ANGIOGRAPHY N/A 3/6/2023    Procedure: ANGIOGRAM, CORONARY ARTERY;  Surgeon: Uche Walton Jr., MD;  Location: [facility] CATH LAB;  Service: Cardiology;  Laterality: N/A;  low bleeding risk 0.9%    ESOPHAGOGASTRODUODENOSCOPY      KNEE ARTHROSCOPY W/ MENISCECTOMY Left 08/22/2018    Procedure: ARTHROSCOPY, KNEE, WITH MENISCECTOMY PARTIAL MEDIAL;  Surgeon: Buster Bansal MD;  Location: [facility];  Service: Orthopedics;  Laterality: Left;  femoral     LUMBAR DISC SURGERY  12/07/2017    L4/5    SPINE SURGERY      UPPER GASTROINTESTINAL ENDOSCOPY         Current Outpatient Medications   Medication Sig    amLODIPine (NORVASC) 5 MG tablet Take 1 tablet (5 mg total) by mouth 2 (two) times daily.    aspirin (ECOTRIN) 81 MG EC tablet Take 1 tablet (81 mg total) by mouth once daily.    atorvastatin (LIPITOR) 80 MG tablet Take 1 tablet (80 mg total) by mouth every evening.    azithromycin (ZITHROMAX Z-LILLIAN) 250 MG tablet Take 2 po day 1, then 1 po day 2 - 5    benzonatate (TESSALON) 100 MG capsule 1 - 2 po every 6 hours prn cough    ezetimibe (ZETIA) 10 mg tablet Take 1 tablet (10 mg total) by mouth once daily.    hydroCHLOROthiazide (HYDRODIURIL) 25 MG tablet Take 1 tablet (25 mg total) by mouth once  daily.    lisinopriL (PRINIVIL,ZESTRIL) 20 MG tablet Take 1 tablet (20 mg total) by mouth 2 (two) times daily.    methylPREDNISolone (MEDROL DOSEPACK) 4 mg tablet Take as directed    metoprolol succinate (TOPROL-XL) 50 MG 24 hr tablet Take 1 tablet (50 mg total) by mouth every evening.    nitroGLYCERIN (NITROSTAT) 0.3 MG SL tablet Place 1 tablet (0.3 mg total) under the tongue every 5 (five) minutes as needed for Chest pain.    pantoprazole (PROTONIX) 40 MG tablet Take 1 tablet (40 mg total) by mouth once daily.    sertraline (ZOLOFT) 100 MG tablet Take 1 tablet (100 mg total) by mouth once daily.    tadalafiL (CIALIS) 20 MG Tab Take 1 tablet (20 mg total) by mouth once daily.     No current facility-administered medications for this visit.       Review of patient's allergies indicates:  No Known Allergies    Family History   Problem Relation Name Age of Onset    Heart disease Mother mom     Arthritis Mother mom     Early death Mother mom     Hypertension Mother mom     Heart disease Father      Heart disease Sister      Atrial fibrillation Sister      Heart disease Brother         Social History     Socioeconomic History    Marital status:    Tobacco Use    Smoking status: Never    Smokeless tobacco: Never   Substance and Sexual Activity    Alcohol use: Yes     Alcohol/week: 2.0 standard drinks of alcohol     Types: 2 Glasses of wine per week     Comment: wine daily    Drug use: Never    Sexual activity: Yes     Partners: Female     Birth control/protection: None   Social History Narrative    Owns company making leather belts.      Social Determinants of Health     Financial Resource Strain: Low Risk  (5/1/2024)    Overall Financial Resource Strain (CARDIA)     Difficulty of Paying Living Expenses: Not hard at all   Food Insecurity: No Food Insecurity (5/1/2024)    Hunger Vital Sign     Worried About Running Out of Food in the Last Year: Never true     Ran Out of Food in the Last Year: Never true    Transportation Needs: No Transportation Needs (5/1/2024)    PRAPARE - Transportation     Lack of Transportation (Medical): No     Lack of Transportation (Non-Medical): No   Physical Activity: Sufficiently Active (5/1/2024)    Exercise Vital Sign     Days of Exercise per Week: 2 days     Minutes of Exercise per Session: 120 min   Stress: No Stress Concern Present (5/1/2024)    [employer] - Occupational Stress Questionnaire     Feeling of Stress : Only a little   Housing Stability: Low Risk  (6/12/2023)    Housing Stability Vital Sign     Unable to Pay for Housing in the Last Year: No     Number of Places Lived in the Last Year: 1     Unstable Housing in the Last Year: No       Chief Complaint: No chief complaint on file.      History of present illness:  69-year-old male who had some achy pain in his right lateral ankle was walking to the airport [place] when he felt a pop along the posterior lateral ankle.  Was unable to bear weight since then.  Patient went to the emergency room where x-rays were taken.  Patient was given crutches.  Pain along the course of the peroneal tendons.  Little no swelling.  Pain is a 4/10 at rest but up to a 10/10 with weight-bearing.        Review of Systems:    Constitution: Negative for chills, fever, and sweats.  Negative for unexplained weight loss.    HENT:  Negative for headaches and blurry vision.    Cardiovascular:Negative for chest pain or irregular heart beat. Negative for hypertension.    Respiratory:  Negative for cough and shortness of breath.    Gastrointestinal: Negative for abdominal pain, heartburn, melena, nausea, and vomitting.    Genitourinary:  Negative bladder incontinence and dysuria.    Musculoskeletal:  See HPI    Neurological: Negative for numbness.    Psychiatric/Behavioral: Negative for depression.  The patient is not nervous/anxious.      Endocrine: Negative for polyuria    Hematologic/Lymphatic: Negative for bleeding  problem.  Does not bruise/bleed easily.    Skin: Negative for poor would healing and rash      Physical Examination:    Vital Signs:  There were no vitals filed for this visit.    There is no height or weight on file to calculate BMI.    This a well-developed, well nourished patient in no acute distress.  They are alert and oriented and cooperative to examination.  Pt. walks using crutches    Examination of the patient's right foot and ankle shows no signs of rashes or erythema. The patient has no ecchymosis or effusion or masses. The patient has a negative anterior drawer and talar tilting exam. The patient has full range of motion of ankle dorsiflexion, plantarflexion, inversion, and eversion. Patient has 5 out of 5 motor strength in all muscle groups. Patient has 2+ dorsalis pedis pulses and intact light touch sensation. The patient is tender along the course of the peroneal tendons from just behind the lateral malleolus to the base of the 5th metatarsal    X-rays:  X-rays Of the right foot and ankle are available for review which show no acute pathology.  Small ossific body located near the base of the 5th metatarsal.         Assessment:  Right peroneal tendon rupture    Plan:  I reviewed the findings with him today.  I recommended an MRI given the patient had an acute pop with pain.  Placed him in a short boot for walking.  Follow up after the MRI is completed.    We performed a custom orthotic/brace fitting, adjusting and training with the patient. The patient demonstrated understanding and proper care. This was performed for 15 minutes.                All previous pertinent notes including ER visits, physical therapy visits, other orthopedic visits as well as other care for the same musculoskeletal problem were reviewed.  All pertinent lab values and previous imaging was reviewed pertinent to the current visit.    This note was created using M Modal voice recognition software that occasionally misinterpreted  phrases or words.    Consult note is delivered via Epic messaging service.

## 2024-07-30 ENCOUNTER — HOSPITAL ENCOUNTER (OUTPATIENT)
Dept: RADIOLOGY | Facility: HOSPITAL | Age: 69
Discharge: HOME | End: 2024-07-30
Attending: ORTHOPAEDIC SURGERY
Payer: MEDICARE

## 2024-07-30 ENCOUNTER — PATIENT MESSAGE (OUTPATIENT)
Dept: ORTHOPEDICS | Facility: CLINIC | Age: 69
End: 2024-07-30
Payer: MEDICARE

## 2024-07-30 DIAGNOSIS — S86.311A PERONEAL TENDON TEAR, RIGHT, INITIAL ENCOUNTER: ICD-10-CM

## 2024-07-30 PROCEDURE — 73721 MRI JNT OF LWR EXTRE W/O DYE: CPT | Mod: 26,RT,, | Performed by: RADIOLOGY

## 2024-07-30 PROCEDURE — 73721 MRI JNT OF LWR EXTRE W/O DYE: CPT | Mod: TC,PO,RT

## 2024-08-16 ENCOUNTER — OFFICE VISIT (OUTPATIENT)
Dept: ORTHOPEDICS | Facility: CLINIC | Age: 69
End: 2024-08-16
Payer: MEDICARE

## 2024-08-16 VITALS — WEIGHT: 259.06 LBS | BODY MASS INDEX: 34.33 KG/M2 | HEIGHT: 73 IN

## 2024-08-16 DIAGNOSIS — S86.311A PERONEAL TENDON TEAR, RIGHT, INITIAL ENCOUNTER: Primary | ICD-10-CM

## 2024-08-16 DIAGNOSIS — M76.71 PERONEAL TENDONITIS OF RIGHT LOWER EXTREMITY: ICD-10-CM

## 2024-08-16 DIAGNOSIS — S86.311A PERONEAL TENDON RUPTURE, RIGHT, INITIAL ENCOUNTER: Primary | ICD-10-CM

## 2024-08-16 PROCEDURE — 99214 OFFICE O/P EST MOD 30 MIN: CPT | Mod: S$PBB,,, | Performed by: ORTHOPAEDIC SURGERY

## 2024-08-16 PROCEDURE — 99999 PR PBB SHADOW E&M-EST. PATIENT-LVL II: CPT | Mod: PBBFAC,,, | Performed by: ORTHOPAEDIC SURGERY

## 2024-08-16 PROCEDURE — 99212 OFFICE O/P EST SF 10 MIN: CPT | Mod: PBBFAC,PO | Performed by: ORTHOPAEDIC SURGERY

## 2024-08-16 NOTE — PROGRESS NOTES
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  Date of Surgery: none  Date of Injury: 07/26/2024  Return visit: 6 weeks  X-rays on Return: none    Chief Complaint:   Chief Complaint   Patient presents with    Results     R ankle MRI results     Present History:  Patient presents today via referral from No ref. provider found   Nilesh Sanchez is a 69 y.o. male who presents today for new patient evaluation.  Reports he [place] when he felt a pop with immediate pain and swelling involving the right lateral/posterolateral ankle.  Subsequently seen by Dr. Agustin.  Patient was placed into a short boot.  This has provided significant symptomatic relief.  3/10 pain today.  Denies any right ankle pain or instability prior to the above.  Pain has significantly improved since time of injury 3 weeks ago.  Otherwise healthy.  Denies tobacco use.  Remains active.  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      Past Medical History:   Diagnosis Date    Allergic rhinosinusitis     Anxiety     Back pain     with spasms    Diverticulitis     s/p partial colectomy    GERD (gastroesophageal reflux disease)     Hx of colonic polyps        Past Surgical History:   Procedure Laterality Date    ARTHROSCOPIC CHONDROPLASTY OF KNEE JOINT Left 08/22/2018    Procedure: ARTHROSCOPY, KNEE, WITH CHONDROPLASTY;  Surgeon: uBster Bansal MD;  Location: [facility] OR;  Service: Orthopedics;  Laterality: Left;    COLECTOMY      18in of sigmoid colon removed, about 2014, [facility]    COLONOSCOPY      COLONOSCOPY N/A 05/13/2022    Procedure: COLONOSCOPY;  Surgeon: Polo Diop MD;  Location: [facility] ENDO;  Service: Endoscopy;  Laterality: N/A;    CORONARY ANGIOGRAPHY N/A 3/6/2023    Procedure: ANGIOGRAM, CORONARY ARTERY;  Surgeon: Uche aWlton Jr., MD;  Location: [facility] CATH LAB;  Service: Cardiology;  Laterality: N/A;  low bleeding risk 0.9%    ESOPHAGOGASTRODUODENOSCOPY      KNEE  ARTHROSCOPY W/ MENISCECTOMY Left 08/22/2018    Procedure: ARTHROSCOPY, KNEE, WITH MENISCECTOMY PARTIAL MEDIAL;  Surgeon: Buster Bansal MD;  Location: [facility];  Service: Orthopedics;  Laterality: Left;  femoral     LUMBAR DISC SURGERY  12/07/2017    L4/5    SPINE SURGERY      UPPER GASTROINTESTINAL ENDOSCOPY         Current Outpatient Medications   Medication Sig    amLODIPine (NORVASC) 5 MG tablet Take 1 tablet (5 mg total) by mouth 2 (two) times daily.    aspirin (ECOTRIN) 81 MG EC tablet Take 1 tablet (81 mg total) by mouth once daily.    atorvastatin (LIPITOR) 80 MG tablet Take 1 tablet (80 mg total) by mouth every evening.    azithromycin (ZITHROMAX Z-LILLIAN) 250 MG tablet Take 2 po day 1, then 1 po day 2 - 5    benzonatate (TESSALON) 100 MG capsule 1 - 2 po every 6 hours prn cough    ezetimibe (ZETIA) 10 mg tablet Take 1 tablet (10 mg total) by mouth once daily.    hydroCHLOROthiazide (HYDRODIURIL) 25 MG tablet Take 1 tablet (25 mg total) by mouth once daily.    lisinopriL (PRINIVIL,ZESTRIL) 20 MG tablet Take 1 tablet (20 mg total) by mouth 2 (two) times daily.    methylPREDNISolone (MEDROL DOSEPACK) 4 mg tablet Take as directed    metoprolol succinate (TOPROL-XL) 50 MG 24 hr tablet Take 1 tablet (50 mg total) by mouth every evening.    nitroGLYCERIN (NITROSTAT) 0.3 MG SL tablet Place 1 tablet (0.3 mg total) under the tongue every 5 (five) minutes as needed for Chest pain.    pantoprazole (PROTONIX) 40 MG tablet Take 1 tablet (40 mg total) by mouth once daily.    sertraline (ZOLOFT) 100 MG tablet Take 1 tablet (100 mg total) by mouth once daily.    tadalafiL (CIALIS) 20 MG Tab Take 1 tablet (20 mg total) by mouth once daily.     No current facility-administered medications for this visit.       Review of patient's allergies indicates:  No Known Allergies    Family History   Problem Relation Name Age of Onset    Heart disease Mother mom     Arthritis Mother mom     Early death Mother mom     Hypertension  Mother mom     Heart disease Father      Heart disease Sister      Atrial fibrillation Sister      Heart disease Brother         Social History     Socioeconomic History    Marital status:    Tobacco Use    Smoking status: Never    Smokeless tobacco: Never   Substance and Sexual Activity    Alcohol use: Yes     Alcohol/week: 2.0 standard drinks of alcohol     Types: 2 Glasses of wine per week     Comment: wine daily    Drug use: Never    Sexual activity: Yes     Partners: Female     Birth control/protection: None   Social History Narrative    Owns company making leather belts.      Social Determinants of Health     Financial Resource Strain: Low Risk  (5/1/2024)    Overall Financial Resource Strain (CARDIA)     Difficulty of Paying Living Expenses: Not hard at all   Food Insecurity: No Food Insecurity (5/1/2024)    Hunger Vital Sign     Worried About Running Out of Food in the Last Year: Never true     Ran Out of Food in the Last Year: Never true   Transportation Needs: No Transportation Needs (5/1/2024)    PRAPARE - Transportation     Lack of Transportation (Medical): No     Lack of Transportation (Non-Medical): No   Physical Activity: Sufficiently Active (5/1/2024)    Exercise Vital Sign     Days of Exercise per Week: 2 days     Minutes of Exercise per Session: 120 min   Stress: No Stress Concern Present (5/1/2024)    [employer] [place] [employer] - Occupational Stress Questionnaire     Feeling of Stress : Only a little   Housing Stability: Low Risk  (6/12/2023)    Housing Stability Vital Sign     Unable to Pay for Housing in the Last Year: No     Number of Places Lived in the Last Year: 1     Unstable Housing in the Last Year: No       Physical exam:  There were no vitals filed for this visit.  Body mass index is 34.18 kg/m².  General: In no apparent distress; well developed and well nourished.  HEENT: normocephalic; atraumatic.  Cardiovascular: regular rate.  Respiratory: no increased work of  breathing.  Musculoskeletal:   Gait: mild antalgic  Inspection:   No gross abnormality noted.  Subtle cavus with neutral hindfoot.    Patient localizes pain involving the posterolateral ankle and hindfoot.  Tenderness on deep palpation spanning from the peroneal tubercle to the 5th metatarsal base.  No ATFL or CFL tenderness.  No laxity with anterior drawer or varus/valgus talar tilt testing.  Minimal pain with 5/5 brevis strength on resisted foot eversion.  No medial based ankle swelling or tenderness.  Silfverskiold: Negative  Alignment:  Knee: neutral               Ankle: neutral              Hindfoot: subtle cavus              Forefoot: neutral   Strength:              Dorsiflexion 5/5  Plantar flexion 5/5  Inversion 5/5  Eversion 5/5  Sensation:              SILT distally  ROM:              Ankle: near full with pain at extremes              Subtalar: near full with pain at extremes  Pulses: Palpable pedal pulse                   Imaging Studies/Outside documentation:  I have ordered/reviewed/interpreted the following images/outside documentation:  1. NON-weight bearing 3-views of Right foot and ankle:   On my independent review, no acute bony abnormality noted.  Ankle mortise remains congruent although limited by nonweightbearing films.  No significant degenerative joint changes.    2. MRI Right ankle w/o contrast performed on 07/30/2024:  On my independent review, increased signal on T2 imaging involving the peroneus longus tendon.  Thickening best seen on axial and sagittal views with suspected longitudinal split tear distal to the tip of the lateral malleolus.  ATFL within normal limits.  Some subtle thinning of CFL.  Diffuse subcutaneous edema involving the lateral ankle.  No evidence of osteochondral lesion.        Assessment:  Nilesh Sanchez is a 69 y.o. 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     Plan:   Clinical and radiographic findings were discussed.  No clear  etiology for pop at time of acute onset pain.  Patient does have what appears to be chronic tendinopathy of the peroneus longus tendon with suspected longitudinal split tear near the level of the tubercle.    As patient has had significant symptomatic relief over the last several weeks, we will plan to continue tall cam boot wear for the next week after which point we will then allow transition out boot and into a lace-up ankle brace.  Brace was provided today.    We will initiate physical therapy per protocol starting the week of 9/3.    Patient voiced understanding.  All questions were answered.  Return to clinic in 6 weeks for repeat evaluation.  Patient may call and cancel if doing well at that time.    We performed a custom orthotic/brace fitting, adjusting and training with the patient. The patient demonstrated understanding and proper care. This was performed for 15 minutes.    This note was created using voice recognition software and may contain grammatical errors.

## 2024-08-19 ENCOUNTER — PATIENT MESSAGE (OUTPATIENT)
Dept: ORTHOPEDICS | Facility: CLINIC | Age: 69
End: 2024-08-19
Payer: MEDICARE
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
## 2024-09-04 ENCOUNTER — CLINICAL SUPPORT (OUTPATIENT)
Dept: REHABILITATION | Facility: HOSPITAL | Age: 69
End: 2024-09-04
Payer: MEDICARE

## 2024-09-04 DIAGNOSIS — R26.89 IMPAIRED GAIT AND MOBILITY: ICD-10-CM

## 2024-09-04 DIAGNOSIS — R26.89 POOR BALANCE: Primary | ICD-10-CM

## 2024-09-04 DIAGNOSIS — R29.898 DECREASED STRENGTH OF LOWER EXTREMITY: ICD-10-CM

## 2024-09-04 PROCEDURE — 97112 NEUROMUSCULAR REEDUCATION: CPT

## 2024-09-04 PROCEDURE — 97161 PT EVAL LOW COMPLEX 20 MIN: CPT

## 2024-09-05 PROBLEM — R26.89 POOR BALANCE: Status: ACTIVE | Noted: 2024-09-05

## 2024-09-06 PROBLEM — R26.89 IMPAIRED GAIT AND MOBILITY: Status: ACTIVE | Noted: 2024-09-06

## 2024-09-09 ENCOUNTER — CLINICAL SUPPORT (OUTPATIENT)
Dept: REHABILITATION | Facility: HOSPITAL | Age: 69
End: 2024-09-09
Payer: MEDICARE

## 2024-09-09 DIAGNOSIS — R26.89 POOR BALANCE: Primary | ICD-10-CM

## 2024-09-09 DIAGNOSIS — R26.89 IMPAIRED GAIT AND MOBILITY: ICD-10-CM

## 2024-09-09 PROCEDURE — 97112 NEUROMUSCULAR REEDUCATION: CPT

## 2024-09-09 PROCEDURE — 97530 THERAPEUTIC ACTIVITIES: CPT

## 2024-09-09 NOTE — PROGRESS NOTES
[facility]   Physical Therapy Treatment Note      Name: Nilesh Sanchez  Clinic Number: [id number]    Therapy Diagnosis:   Encounter Diagnoses   Name Primary?    Poor balance Yes    Impaired gait and mobility      Physician: Stone Krishnamurthy MD    Visit Date: 9/9/2024    Physician Orders: PT Eval and Treat   Medical Diagnosis from Referral: S86.311A (ICD-10-CM) - Peroneal tendon tear, right, initial encounter M76.71 (ICD-10-CM) - Peroneal tendonitis of right lower extremity   Evaluation Date: 9/4/2024  Authorization Period Expiration: 8/17/2025  Plan of Care Expiration: 11/29/2024  Progress Note Due: 10th visit  Visit # / Visits authorized: 1/ 20   FOTO: 1/ 3     Precautions: Standard      Time In: 10:00  am  Time Out: 11:00 am  Total Billable Time: 57 minutes    PTA Visit #: 0/5       Subjective     Patient reports: his left hip was feeling much better after his initial evaluation which helped his gait.  He was compliant with home exercise program.  Response to previous treatment: no adverse effects  Functional change: nothing new to report    Pain: 0/10  Location: left ankles     Objective      Objective Measures updated at progress report unless specified.     Treatment     Phil received the treatments listed below:    MANUAL THERAPY TECHNIQUES including Joint mobilizations, Myofacial release, and Soft tissue Mobilization were applied to left hip for 3 minutes.  Long axis distraction of lef hip    neuromuscular re-education activities to improve: Balance, Coordination, Proprioception, Posture, and motor control for 47 minutes. The following activities were included:  Towel crunches 2x   Toe yoga : 2x10  Marbles: 1x18 each  Heel raise 3x10  Inv heel raise 3x10  Ev heel raise 3x10  SL balance 30 seconds, 3x each  Bridges: 5 seconds, 2x10    therapeutic activities to improve functional performance for 10  minutes, including:  Shuttle leg press: 75 lbs 4x10   Shuttle single leg press: 32.5  lbs 4x10 each      Patient Education and Home Exercises       Education provided:   - - HEP  - plan of care  -prognosis  -importance of range of motion for proper gait cycle and functional mobility  - relevant anatomy   -healing times  - importance of SL balance and fall predictor/prevention    Written Home Exercises Provided: Pt instructed to continue prior HEP. Exercises were reviewed and Phil was able to demonstrate them prior to the end of the session.  Phil demonstrated good  understanding of the education provided. See Electronic Medical Record under Patient Instructions for exercises provided during therapy sessions    Assessment     Tolerated MT interventions well. Challenged with SL balance activities. Adequate fatigue achieved during shuttle leg press to address functional strength deficits.     Phil Is progressing well towards his goals.   Patient prognosis is Good.     Patient will continue to benefit from skilled outpatient physical therapy to address the deficits listed in the problem list box on initial evaluation, provide pt/family education and to maximize pt's level of independence in the home and community environment.     Patient's spiritual, cultural and educational needs considered and pt agreeable to plan of care and goals.     Anticipated barriers to physical therapy: chronicity, age     Goals:   Short Term goals (4 weeks)  1.Patient will be independent with home exercise program to supplement physical therapy treatment in improving functional status.  2.Pt will improve impaired lower extremity manual muscle tests to >/= 4/5 to improve dynamic right knee support for closed chain tasks.  3.Patient will improve R and L dorsiflexion active range of motion to equal opposite ankle to improve gait mechanics.  4.Pt will improve perform R and L SLS for >10 seconds to reduce fall risk and to improve strength of ankle intrinsics     Long Term Goals: (10 weeks)  1.Patient will improve the total FOTO ankle  Survey Score to </= 40% limited to demonstrate increased perceived functional mobility.   2. Pt will perform R and L SLS for >15-20 seconds to reduce fall risk and to improve strength of ankle intrinsics  3. Pt will ambulate greater than or equal to 1,000 feet without AD, on unlevel surface in order to meet recreational and workplace needs  4. Pt will perform  R and L SLS while tossing weighted ball, 3-5x to demonstrate improved dynamic balance and proprioception.  5 Pt will improve impaired lower extremity manual muscle tests to >/= 5/5 to improve dynamic right knee support for closed chain tasks.    Plan     Plan of care Certification: 9/4/2024 to 11/29/2024.     Outpatient Physical Therapy 2 times weekly for 12 weeks to include the following interventions: Aquatic Therapy, Cervical/Lumbar Traction, Electrical Stimulation TENS, Gait Training, Manual Therapy, Moist Heat/ Ice, Neuromuscular Re-ed, Orthotic Management and Training, Patient Education, Self Care, Therapeutic Activities, Therapeutic Exercise, and functional dry needling    Nichole Woodward, PT, DPT, OCS

## 2024-09-12 ENCOUNTER — CLINICAL SUPPORT (OUTPATIENT)
Dept: REHABILITATION | Facility: HOSPITAL | Age: 69
End: 2024-09-12
Payer: MEDICARE

## 2024-09-12 DIAGNOSIS — R26.89 IMPAIRED GAIT AND MOBILITY: ICD-10-CM

## 2024-09-12 DIAGNOSIS — R26.89 POOR BALANCE: Primary | ICD-10-CM

## 2024-09-12 PROCEDURE — 97530 THERAPEUTIC ACTIVITIES: CPT

## 2024-09-12 PROCEDURE — 97112 NEUROMUSCULAR REEDUCATION: CPT

## 2024-09-12 NOTE — PROGRESS NOTES
[facility]   Physical Therapy Treatment Note      Name: Nilesh Sanchez  [facility] Number: [id number]    Therapy Diagnosis:   Encounter Diagnoses   Name Primary?    Poor balance Yes    Impaired gait and mobility      Physician: Stone Krishnamurthy MD    Visit Date: 9/12/2024    Physician Orders: PT Eval and Treat   Medical Diagnosis from Referral: S86.311A (ICD-10-CM) - Peroneal tendon tear, right, initial encounter M76.71 (ICD-10-CM) - Peroneal tendonitis of right lower extremity   Evaluation Date: 9/4/2024  Authorization Period Expiration: 8/17/2025  Plan of Care Expiration: 11/29/2024  Progress Note Due: 10th visit  Visit # / Visits authorized: 2/ 20   FOTO: 1/ 3     Precautions: Standard      Time In: 10:00  am  Time Out: 11:00 am  Total Billable Time: 30 minutes    PTA Visit #: 0/5       Subjective     Patient reports: doing okay today; no new complaints  He was compliant with home exercise program.  Response to previous treatment: no adverse effects  Functional change: nothing new to report    Pain: 0/10  Location: left ankles     Objective      Objective Measures updated at progress report unless specified.     Treatment     Phil received the treatments listed below:    MANUAL THERAPY TECHNIQUES including Joint mobilizations, Myofacial release, and Soft tissue Mobilization were applied to left hip for 5 minutes.  Long axis distraction of lef hip    neuromuscular re-education activities to improve: Balance, Coordination, Proprioception, Posture, and motor control for 45 minutes. The following activities were included:  Towel crunches 2x + 1 lb  Marbles: 1x18 each  Heel raise 4x10  Inv heel raise 4x10  Ev heel raise 4x10  SL balance 30 seconds, 3x each  Bridges: 5 seconds, 3x10 + blue theraband  Lateral walks: yellow theraband 4 laps   Sidelying clams: 10 seconds, 2x8 blue theraband    therapeutic activities to improve functional performance for 10  minutes, including:  Shuttle leg press:  75 lbs 4x10   Shuttle single leg press: 32.5 lbs 4x10 each      Patient Education and Home Exercises       Education provided:   - - HEP  - plan of care  -prognosis  -importance of range of motion for proper gait cycle and functional mobility  - relevant anatomy   -healing times  - importance of SL balance and fall predictor/prevention    Written Home Exercises Provided: Pt instructed to continue prior HEP. Exercises were reviewed and Phil was able to demonstrate them prior to the end of the session.  Phil demonstrated good  understanding of the education provided. See Electronic Medical Record under Patient Instructions for exercises provided during therapy sessions    Assessment     Tolerated MT interventions well. Challenged with SL balance activities. Adequate fatigue achieved during shuttle leg press to address functional strength deficits. Addition of sidelying clams and lateral walks to address posterolateral hip motor control and endurance.     Phil Is progressing well towards his goals.   Patient prognosis is Good.     Patient will continue to benefit from skilled outpatient physical therapy to address the deficits listed in the problem list box on initial evaluation, provide pt/family education and to maximize pt's level of independence in the home and community environment.     Patient's spiritual, cultural and educational needs considered and pt agreeable to plan of care and goals.     Anticipated barriers to physical therapy: chronicity, age     Goals:   Short Term goals (4 weeks)  1.Patient will be independent with home exercise program to supplement physical therapy treatment in improving functional status.  2.Pt will improve impaired lower extremity manual muscle tests to >/= 4/5 to improve dynamic right knee support for closed chain tasks.  3.Patient will improve R and L dorsiflexion active range of motion to equal opposite ankle to improve gait mechanics.  4.Pt will improve perform R and L SLS for >10  seconds to reduce fall risk and to improve strength of ankle intrinsics     Long Term Goals: (10 weeks)  1.Patient will improve the total FOTO ankle Survey Score to </= 40% limited to demonstrate increased perceived functional mobility.   2. Pt will perform R and L SLS for >15-20 seconds to reduce fall risk and to improve strength of ankle intrinsics  3. Pt will ambulate greater than or equal to 1,000 feet without AD, on unlevel surface in order to meet recreational and workplace needs  4. Pt will perform  R and L SLS while tossing weighted ball, 3-5x to demonstrate improved dynamic balance and proprioception.  5 Pt will improve impaired lower extremity manual muscle tests to >/= 5/5 to improve dynamic right knee support for closed chain tasks.    Plan     Plan of care Certification: 9/4/2024 to 11/29/2024.     Outpatient Physical Therapy 2 times weekly for 12 weeks to include the following interventions: Aquatic Therapy, Cervical/Lumbar Traction, Electrical Stimulation TENS, Gait Training, Manual Therapy, Moist Heat/ Ice, Neuromuscular Re-ed, Orthotic Management and Training, Patient Education, Self Care, Therapeutic Activities, Therapeutic Exercise, and functional dry needling    Nichole Woodward, PT, DPT, OCS

## 2024-09-18 ENCOUNTER — CLINICAL SUPPORT (OUTPATIENT)
Dept: REHABILITATION | Facility: HOSPITAL | Age: 69
End: 2024-09-18
Payer: MEDICARE

## 2024-09-18 DIAGNOSIS — R26.89 IMPAIRED GAIT AND MOBILITY: ICD-10-CM

## 2024-09-18 DIAGNOSIS — R26.89 POOR BALANCE: Primary | ICD-10-CM

## 2024-09-18 PROCEDURE — 97112 NEUROMUSCULAR REEDUCATION: CPT

## 2024-09-18 NOTE — PROGRESS NOTES
[facility]   Physical Therapy Treatment Note      Name: Nilesh Sanchez  [facility] Number: [id number]    Therapy Diagnosis:   Encounter Diagnoses   Name Primary?    Poor balance Yes    Impaired gait and mobility      Physician: Stone Krishnamurthy MD    Visit Date: 9/18/2024    Physician Orders: PT Eval and Treat   Medical Diagnosis from Referral: S86.311A (ICD-10-CM) - Peroneal tendon tear, right, initial encounter M76.71 (ICD-10-CM) - Peroneal tendonitis of right lower extremity   Evaluation Date: 9/4/2024  Authorization Period Expiration: 8/17/2025  Plan of Care Expiration: 11/29/2024  Progress Note Due: 10th visit  Visit # / Visits authorized: 3/ 20   FOTO: 1/ 3     Precautions: Standard      Time In: 2:30 pm  Time Out: 3:30 pm  Total Billable Time: 30 minutes    PTA Visit #: 0/5       Subjective     Patient reports: doing well; continues to wear ankle brace  He was compliant with home exercise program.  Response to previous treatment: no adverse effects  Functional change: nothing new to report    Pain: 0/10  Location: left ankles     Objective      Objective Measures updated at progress report unless specified.     Treatment     Phil received the treatments listed below:    MANUAL THERAPY TECHNIQUES including Joint mobilizations, Myofacial release, and Soft tissue Mobilization were applied to left hip for 00 minutes.  Long axis distraction of lef hip    THERAPEUTIC EXERCISES to develop ROM and flexibility for 8 minutes including activities below  Gastroc/soleus  stretching on wedge: 45 seconds, 4x each    neuromuscular re-education activities to improve: Balance, Coordination, Proprioception, Posture, and motor control for 42 minutes. The following activities were included:  SL balance 30 seconds, 4x each  Bridges: 5 seconds, 3x10 + blue theraband  Lateral walks: yellow theraband 4 laps   Sidelying clams: 10 seconds, 2x8 blue theraband    Held:  Towel crunches 2x + 1 lb  Marbles: 1x18  each  Heel raise 4x10  Inv heel raise 4x10  Ev heel raise 4x10    therapeutic activities to improve functional performance for 10  minutes, including:  Shuttle leg press: 75 lbs 4x10   Shuttle single leg press: 32.5 lbs 4x10 each  Forward step downs: 4 inch 4x10 each      Patient Education and Home Exercises       Education provided:   - - HEP  - plan of care  -prognosis  -importance of range of motion for proper gait cycle and functional mobility  - relevant anatomy   -healing times  - importance of SL balance and fall predictor/prevention    Written Home Exercises Provided: Pt instructed to continue prior HEP. Exercises were reviewed and Phil was able to demonstrate them prior to the end of the session.  Phil demonstrated good  understanding of the education provided. See Electronic Medical Record under Patient Instructions for exercises provided during therapy sessions    Assessment     Continued to emphasize posterolateral hip strengthening to assist in static and dynamic balance. Would benefit from progression to easy dynamic balance activities such as farmer's marches as patient continues to struggle with static single leg balance.     Phil Is progressing well towards his goals.   Patient prognosis is Good.     Patient will continue to benefit from skilled outpatient physical therapy to address the deficits listed in the problem list box on initial evaluation, provide pt/family education and to maximize pt's level of independence in the home and community environment.     Patient's spiritual, cultural and educational needs considered and pt agreeable to plan of care and goals.     Anticipated barriers to physical therapy: chronicity, age     Goals:   Short Term goals (4 weeks)  1.Patient will be independent with home exercise program to supplement physical therapy treatment in improving functional status.  2.Pt will improve impaired lower extremity manual muscle tests to >/= 4/5 to improve dynamic right knee  support for closed chain tasks.  3.Patient will improve R and L dorsiflexion active range of motion to equal opposite ankle to improve gait mechanics.  4.Pt will improve perform R and L SLS for >10 seconds to reduce fall risk and to improve strength of ankle intrinsics     Long Term Goals: (10 weeks)  1.Patient will improve the total FOTO ankle Survey Score to </= 40% limited to demonstrate increased perceived functional mobility.   2. Pt will perform R and L SLS for >15-20 seconds to reduce fall risk and to improve strength of ankle intrinsics  3. Pt will ambulate greater than or equal to 1,000 feet without AD, on unlevel surface in order to meet recreational and workplace needs  4. Pt will perform  R and L SLS while tossing weighted ball, 3-5x to demonstrate improved dynamic balance and proprioception.  5 Pt will improve impaired lower extremity manual muscle tests to >/= 5/5 to improve dynamic right knee support for closed chain tasks.    Plan     Plan of care Certification: 9/4/2024 to 11/29/2024.     Outpatient Physical Therapy 2 times weekly for 12 weeks to include the following interventions: Aquatic Therapy, Cervical/Lumbar Traction, Electrical Stimulation TENS, Gait Training, Manual Therapy, Moist Heat/ Ice, Neuromuscular Re-ed, Orthotic Management and Training, Patient Education, Self Care, Therapeutic Activities, Therapeutic Exercise, and functional dry needling    Nichole Woodward, PT, DPT, OCS

## 2024-09-20 ENCOUNTER — CLINICAL SUPPORT (OUTPATIENT)
Dept: REHABILITATION | Facility: HOSPITAL | Age: 69
End: 2024-09-20
Payer: MEDICARE

## 2024-09-20 ENCOUNTER — PATIENT MESSAGE (OUTPATIENT)
Dept: REHABILITATION | Facility: HOSPITAL | Age: 69
End: 2024-09-20

## 2024-09-20 DIAGNOSIS — R26.89 POOR BALANCE: Primary | ICD-10-CM

## 2024-09-20 DIAGNOSIS — R26.89 IMPAIRED GAIT AND MOBILITY: ICD-10-CM

## 2024-09-20 PROCEDURE — 97530 THERAPEUTIC ACTIVITIES: CPT | Mod: KX

## 2024-09-20 PROCEDURE — 97112 NEUROMUSCULAR REEDUCATION: CPT | Mod: KX

## 2024-09-20 PROCEDURE — 97110 THERAPEUTIC EXERCISES: CPT | Mod: KX

## 2024-09-20 NOTE — PROGRESS NOTES
[facility]   Physical Therapy Treatment Note      Name: Nilesh Sanchez  [facility] Number: [id number]    Therapy Diagnosis:   Encounter Diagnoses   Name Primary?    Poor balance Yes    Impaired gait and mobility      Physician: Stone Krishnamurthy MD    Visit Date: 9/20/2024    Physician Orders: PT Eval and Treat   Medical Diagnosis from Referral: S86.311A (ICD-10-CM) - Peroneal tendon tear, right, initial encounter M76.71 (ICD-10-CM) - Peroneal tendonitis of right lower extremity   Evaluation Date: 9/4/2024  Authorization Period Expiration: 8/17/2025  Plan of Care Expiration: 11/29/2024  Progress Note Due: 10th visit  Visit # / Visits authorized: 3/ 20   FOTO: 1/ 3     Precautions: Standard      Time In: 2:30 pm  Time Out: 3:30 pm  Total Billable Time: 55 minutes    PTA Visit #: 0/5       Subjective     Patient reports: doing well; continues to wear ankle brace  He was compliant with home exercise program.  Response to previous treatment: no adverse effects  Functional change: nothing new to report    Pain: 0/10  Location: left ankles     Objective      Objective Measures updated at progress report unless specified.     Treatment     Phil received the treatments listed below:    MANUAL THERAPY TECHNIQUES including Joint mobilizations, Myofacial release, and Soft tissue Mobilization were applied to left hip for 5 minutes.  Long axis distraction of lef hip    THERAPEUTIC EXERCISES to develop ROM and flexibility for 8 minutes including activities below  Gastroc/soleus  stretching on wedge: 45 seconds, 4x each    neuromuscular re-education activities to improve: Balance, Coordination, Proprioception, Posture, and motor control for 37 minutes. The following activities were included:  SL balance 30 seconds, 4x each  Bridges: 5 seconds, 3x10 + blue theraband  Lateral walks: yellow theraband 4 laps   Sidelying clams: 10 seconds, 2x8 blue theraband    Next visit: farmer's carries    Held:  Towel  crunches 2x + 1 lb  Marbles: 1x18 each  Heel raise 4x10  Inv heel raise 4x10  Ev heel raise 4x10    therapeutic activities to improve functional performance for 10  minutes, including:  Shuttle leg press: 75 lbs 4x10   Shuttle single leg press: 32.5 lbs 4x10 each  Forward step downs: 4 inch 4x10 each      Patient Education and Home Exercises       Education provided:   - - HEP  - plan of care  -prognosis  -importance of range of motion for proper gait cycle and functional mobility  - relevant anatomy   -healing times  - importance of SL balance and fall predictor/prevention    Written Home Exercises Provided: Pt instructed to continue prior HEP. Exercises were reviewed and Phil was able to demonstrate them prior to the end of the session.  Phil demonstrated good  understanding of the education provided. See Electronic Medical Record under Patient Instructions for exercises provided during therapy sessions    Assessment     Emphasized posterolateral hip strengthening to assist in static and dynamic balance. Progressed resistance on shuttle leg press with good tolerance. Tolerates forward step downs well demonstrating good control.    Phil Is progressing well towards his goals.   Patient prognosis is Good.     Patient will continue to benefit from skilled outpatient physical therapy to address the deficits listed in the problem list box on initial evaluation, provide pt/family education and to maximize pt's level of independence in the home and community environment.     Patient's spiritual, cultural and educational needs considered and pt agreeable to plan of care and goals.     Anticipated barriers to physical therapy: chronicity, age     Goals:   Short Term goals (4 weeks)  1.Patient will be independent with home exercise program to supplement physical therapy treatment in improving functional status.  2.Pt will improve impaired lower extremity manual muscle tests to >/= 4/5 to improve dynamic right knee support for  closed chain tasks.  3.Patient will improve R and L dorsiflexion active range of motion to equal opposite ankle to improve gait mechanics.  4.Pt will improve perform R and L SLS for >10 seconds to reduce fall risk and to improve strength of ankle intrinsics     Long Term Goals: (10 weeks)  1.Patient will improve the total FOTO ankle Survey Score to </= 40% limited to demonstrate increased perceived functional mobility.   2. Pt will perform R and L SLS for >15-20 seconds to reduce fall risk and to improve strength of ankle intrinsics  3. Pt will ambulate greater than or equal to 1,000 feet without AD, on unlevel surface in order to meet recreational and workplace needs  4. Pt will perform  R and L SLS while tossing weighted ball, 3-5x to demonstrate improved dynamic balance and proprioception.  5 Pt will improve impaired lower extremity manual muscle tests to >/= 5/5 to improve dynamic right knee support for closed chain tasks.    Plan     Plan of care Certification: 9/4/2024 to 11/29/2024.     Outpatient Physical Therapy 2 times weekly for 12 weeks to include the following interventions: Aquatic Therapy, Cervical/Lumbar Traction, Electrical Stimulation TENS, Gait Training, Manual Therapy, Moist Heat/ Ice, Neuromuscular Re-ed, Orthotic Management and Training, Patient Education, Self Care, Therapeutic Activities, Therapeutic Exercise, and functional dry needling    Nichole Woodward, PT, DPT, OCS

## 2024-09-25 ENCOUNTER — CLINICAL SUPPORT (OUTPATIENT)
Dept: REHABILITATION | Facility: HOSPITAL | Age: 69
End: 2024-09-25
Payer: MEDICARE

## 2024-09-25 DIAGNOSIS — R26.89 IMPAIRED GAIT AND MOBILITY: ICD-10-CM

## 2024-09-25 DIAGNOSIS — R26.89 POOR BALANCE: Primary | ICD-10-CM

## 2024-09-25 PROCEDURE — 97112 NEUROMUSCULAR REEDUCATION: CPT | Mod: KX

## 2024-09-25 PROCEDURE — 97530 THERAPEUTIC ACTIVITIES: CPT | Mod: KX

## 2024-09-25 NOTE — PROGRESS NOTES
[facility]   Physical Therapy Treatment Note      Name: Nilesh Sanchez  [facility] Number: [id number]    Therapy Diagnosis:   Encounter Diagnoses   Name Primary?    Poor balance Yes    Impaired gait and mobility        Physician: Stone Krishnamurthy MD    Visit Date: 9/25/2024    Physician Orders: PT Eval and Treat   Medical Diagnosis from Referral: S86.311A (ICD-10-CM) - Peroneal tendon tear, right, initial encounter M76.71 (ICD-10-CM) - Peroneal tendonitis of right lower extremity   Evaluation Date: 9/4/2024  Authorization Period Expiration: 8/17/2025  Plan of Care Expiration: 11/29/2024  Progress Note Due: 10th visit  Visit # / Visits authorized: 3/ 20   FOTO: 1/ 3     Precautions: Standard      Time In: 2:30 pm  Time Out: 3:30 pm  Total Billable Time: 30 minutes    PTA Visit #: 0/5       Subjective     Patient reports: he does not have pain; no pain when walking without ankle brace. Requests discharge.  He was compliant with home exercise program.  Response to previous treatment: no adverse effects  Functional change: nothing new to report    Pain: 0/10  Location: left ankles     Objective      Objective Measures updated at progress report unless specified.     Treatment     Phil received the treatments listed below:    MANUAL THERAPY TECHNIQUES including Joint mobilizations, Myofacial release, and Soft tissue Mobilization were applied to left hip for 00 minutes.  Long axis distraction of lef hip    THERAPEUTIC EXERCISES to develop ROM and flexibility for 8 minutes including activities below  Gastroc/soleus  stretching on wedge: 45 seconds, 4x each    neuromuscular re-education activities to improve: Balance, Coordination, Proprioception, Posture, and motor control for 39 minutes. The following activities were included:  SL balance 30 seconds, 4x each  Bridges: 5 seconds, 3x10 + blue theraband  Lateral walks: yellow theraband 4 laps   Sidelying clams: 10 seconds, 2x8 blue  theraband    therapeutic activities to improve functional performance for 12  minutes, including:  Shuttle leg press: 75 lbs 4x10   Shuttle single leg press: 32.5 lbs 4x10 each  Forward step downs: 4 inch 4x10 each    Patient Education and Home Exercises       Education provided:   -  HEP  - plan of care  -prognosis  -importance of range of motion for proper gait cycle and functional mobility  - relevant anatomy   -healing times  - importance of SL balance and fall predictor/prevention    Written Home Exercises Provided: Pt instructed to continue prior HEP. Exercises were reviewed and Phil was able to demonstrate them prior to the end of the session.  Phil demonstrated good  understanding of the education provided. See Electronic Medical Record under Patient Instructions for exercises provided during therapy sessions    Assessment     Patient has met majority short term goals, however several unmet long term physical therapy goals at this time. Patient requested discharge. Patient has returned to full independence with all household and personal ADL's at this time. Patient will be discharged with an updated HEP.       Date of Last visit: 9/25/2024  Total Visits Received: 5    Discharge FOTO Score: see media  Discharge reason: Patient requested discharge    Phil Is progressing well towards his goals.   Patient prognosis is Good.     Patient will continue to benefit from skilled outpatient physical therapy to address the deficits listed in the problem list box on initial evaluation, provide pt/family education and to maximize pt's level of independence in the home and community environment.     Patient's spiritual, cultural and educational needs considered and pt agreeable to plan of care and goals.     Anticipated barriers to physical therapy: chronicity, age     Goals:   Short Term goals (4 weeks)  1.Patient will be independent with home exercise program to supplement physical therapy treatment in improving functional  status. MET  2.Pt will improve impaired lower extremity manual muscle tests to >/= 4/5 to improve dynamic right knee support for closed chain tasks. Not tested  3.Patient will improve R and L dorsiflexion active range of motion to equal opposite ankle to improve gait mechanics. MET  4.Pt will improve perform R and L SLS for >10 seconds to reduce fall risk and to improve strength of ankle intrinsics MET     Long Term Goals: (10 weeks)  1.Patient will improve the total FOTO ankle Survey Score to </= 40% limited to demonstrate increased perceived functional mobility. Not tested  2. Pt will perform R and L SLS for >15-20 seconds to reduce fall risk and to improve strength of ankle intrinsics unmet  3. Pt will ambulate greater than or equal to 1,000 feet without AD, on unlevel surface in order to meet recreational and workplace needs MET  4. Pt will perform  R and L SLS while tossing weighted ball, 3-5x to demonstrate improved dynamic balance and proprioception. Not tested  5 Pt will improve impaired lower extremity manual muscle tests to >/= 5/5 to improve dynamic right knee support for closed chain tasks. Not met    Plan     This patient is discharged from Physical Therapy    Nichole Woodward, PT, DPT, OCS

## 2024-10-16 ENCOUNTER — HOSPITAL ENCOUNTER (OUTPATIENT)
Dept: RADIOLOGY | Facility: HOSPITAL | Age: 69
Discharge: HOME | End: 2024-10-16
Attending: PHYSICIAN ASSISTANT
Payer: MEDICARE

## 2024-10-16 ENCOUNTER — OFFICE VISIT (OUTPATIENT)
Dept: FAMILY MEDICINE | Facility: CLINIC | Age: 69
End: 2024-10-16
Payer: MEDICARE

## 2024-10-16 VITALS
HEART RATE: 74 BPM | HEIGHT: 73 IN | OXYGEN SATURATION: 95 % | DIASTOLIC BLOOD PRESSURE: 86 MMHG | BODY MASS INDEX: 34.39 KG/M2 | WEIGHT: 259.5 LBS | SYSTOLIC BLOOD PRESSURE: 146 MMHG

## 2024-10-16 DIAGNOSIS — S09.90XA INJURY OF HEAD, INITIAL ENCOUNTER: ICD-10-CM
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
## 2024-10-16 DIAGNOSIS — R42 DIZZINESS AND GIDDINESS: ICD-10-CM

## 2024-10-16 DIAGNOSIS — R51.9 FACE PAIN: ICD-10-CM

## 2024-10-16 DIAGNOSIS — S09.90XA INJURY OF HEAD, INITIAL ENCOUNTER: Primary | ICD-10-CM

## 2024-10-16 DIAGNOSIS — S02.31XA CLOSED BLOW-OUT FRACTURE OF RIGHT ORBITAL FLOOR: ICD-10-CM

## 2024-10-16 LAB
LEFT EYE DM RETINOPATHY: NEGATIVE
RIGHT EYE DM RETINOPATHY: NEGATIVE

## 2024-10-16 PROCEDURE — 70486 CT MAXILLOFACIAL W/O DYE: CPT | Mod: TC,PO

## 2024-10-16 PROCEDURE — 70450 CT HEAD/BRAIN W/O DYE: CPT | Mod: TC,PO

## 2024-10-16 PROCEDURE — 99215 OFFICE O/P EST HI 40 MIN: CPT | Mod: S$PBB,,, | Performed by: PHYSICIAN ASSISTANT

## 2024-10-16 PROCEDURE — 99999 PR PBB SHADOW E&M-EST. PATIENT-LVL IV: CPT | Mod: PBBFAC,,, | Performed by: PHYSICIAN ASSISTANT

## 2024-10-16 PROCEDURE — 99214 OFFICE O/P EST MOD 30 MIN: CPT | Mod: PBBFAC,25,PO | Performed by: PHYSICIAN ASSISTANT

## 2024-10-16 PROCEDURE — 70450 CT HEAD/BRAIN W/O DYE: CPT | Mod: 26,,, | Performed by: RADIOLOGY

## 2024-10-16 PROCEDURE — 70486 CT MAXILLOFACIAL W/O DYE: CPT | Mod: 26,,, | Performed by: RADIOLOGY

## 2024-10-16 NOTE — PROGRESS NOTES
"Subjective:      Patient ID: Nilesh Sanchez is a 69 y.o. male.    Chief Complaint: Fall    HPI  Patient has PMH of balance problem, HTN, HLD, CAD, CHF, BPH, prediabetes, and GERD.    Patient tripped and fell yesterday onto right frontal head and right eye.  Did not lose consciousness.  Caught himself with hands, but no injury to those.  Taking tylenol for pain.  Going to opthalmology after this appointment.    BP Readings from Last 3 Encounters:   10/16/24 (!) 146/86   07/26/24 134/76   07/24/24 (!) 167/90      Review of Systems   Constitutional:  Positive for activity change. Negative for unexpected weight change.   HENT:  Negative for hearing loss, rhinorrhea and trouble swallowing.    Eyes:  Positive for discharge and visual disturbance.   Respiratory:  Negative for chest tightness, shortness of breath and wheezing.    Cardiovascular:  Negative for chest pain and palpitations.   Gastrointestinal:  Negative for blood in stool, constipation, diarrhea, nausea and vomiting.   Endocrine: Negative for polydipsia and polyuria.   Genitourinary:  Negative for difficulty urinating, hematuria and urgency.   Musculoskeletal:  Positive for arthralgias. Negative for joint swelling and neck pain.   Skin:  Positive for wound.   Neurological:  Positive for headaches. Negative for weakness.   Psychiatric/Behavioral:  Negative for confusion and dysphoric mood.        Objective:   BP (!) 146/86 (Patient Position: Sitting)   Pulse 74   Ht 6' 1" (1.854 m)   Wt 117.7 kg (259 lb 7.7 oz)   SpO2 95%   BMI 34.23 kg/m²     Physical Exam  Vitals reviewed.   Constitutional:       Appearance: He is well-developed.   HENT:      Head: Normocephalic. Raccoon eyes (right eye), abrasion (above right eye with some scrapes lateral of right eye), contusion (around right eye) and right periorbital erythema (with edema) present.      Right Ear: Hearing and external ear normal.      Left Ear: Hearing and external ear normal.      Nose: Nose normal. "   Eyes:      Extraocular Movements: Extraocular movements intact.      Conjunctiva/sclera: Conjunctivae normal.      Comments: Right eye is swollen with a hematoma, still has intact EOMs when holding eye open.     Cardiovascular:      Rate and Rhythm: Normal rate and regular rhythm.      Heart sounds: Normal heart sounds. No murmur heard.     No friction rub. No gallop.   Pulmonary:      Effort: Pulmonary effort is normal. No respiratory distress.      Breath sounds: Normal breath sounds. No wheezing, rhonchi or rales.   Musculoskeletal:         General: Normal range of motion.   Skin:     General: Skin is warm and dry.      Findings: No rash.   Neurological:      General: No focal deficit present.      Mental Status: He is alert and oriented to person, place, and time.      Comments: Neuro and strength exams are normal and symmetrical.   Psychiatric:         Mood and Affect: Mood normal.         Behavior: Behavior normal. Behavior is cooperative.         Judgment: Judgment normal.       Assessment:      1. Injury of head, initial encounter    2. Fall, initial encounter    3. Dizziness and giddiness    4. Face pain    5. Closed blow-out fracture of right orbital floor       Plan:   1. Injury of head, initial encounter (Primary)  Gave ER precautions for any worsening symptoms-vomiting, intense headaches, vision issues-especially over the next 48 hours.  Will see ophthalmology after visit today.  Also, discussed signs of infection to watch for.  - CT Head Without Contrast; Future    2. Fall, initial encounter  - CT Head Without Contrast; Future    3. Dizziness and giddiness  - CT Head Without Contrast; Future    4. Face pain  - CT Maxillofacial Without Contrast; Future    5. Closed blow-out fracture of right orbital floor  Will defer to ophthalmology for management.    Follow up as needed.  Patient agreed with plan and expressed understanding.  I spent over 45 minutes on this encounter, time includes face-to-face,  chart review, documentation, test review and orders.    Thank you for allowing me to serve you,

## 2024-10-17 ENCOUNTER — PATIENT MESSAGE (OUTPATIENT)
Dept: FAMILY MEDICINE | Facility: CLINIC | Age: 69
End: 2024-10-17
Payer: MEDICARE

## 2024-10-22 ENCOUNTER — PATIENT OUTREACH (OUTPATIENT)
Dept: ADMINISTRATIVE | Facility: HOSPITAL | Age: 69
End: 2024-10-22
Payer: MEDICARE

## 2024-10-22 ENCOUNTER — PATIENT MESSAGE (OUTPATIENT)
Dept: FAMILY MEDICINE | Facility: CLINIC | Age: 69
End: 2024-10-22
Payer: MEDICARE

## 2024-10-22 NOTE — PROGRESS NOTES
Population Health Chart Review & Patient Outreach Details      Additional Pop Health Notes:               Updates Requested / Reviewed:      Updated Care Coordination Note and          Health Maintenance Topics Overdue:      VBHM Score: 2     Uncontrolled BP  Hemoglobin A1c    Influenza Vaccine  RSV Vaccine                  Health Maintenance Topic(s) Outreach Outcomes & Actions Taken:    Eye Exam - Outreach Outcomes & Actions Taken  : Diabetic Eye External Records Uploaded, Care Team & History Updated if Applicable

## 2024-12-02 ENCOUNTER — PATIENT MESSAGE (OUTPATIENT)
Dept: OTHER | Facility: OTHER | Age: 69
End: 2024-12-02
Payer: MEDICARE

## 2024-12-04 ENCOUNTER — PATIENT MESSAGE (OUTPATIENT)
Dept: OTHER | Facility: OTHER | Age: 69
End: 2024-12-04
Payer: MEDICARE

## 2025-01-08 ENCOUNTER — OFFICE VISIT (OUTPATIENT)
Dept: FAMILY MEDICINE | Facility: CLINIC | Age: 70
End: 2025-01-08
Payer: MEDICARE

## 2025-01-08 ENCOUNTER — PATIENT MESSAGE (OUTPATIENT)
Dept: FAMILY MEDICINE | Facility: CLINIC | Age: 70
End: 2025-01-08

## 2025-01-08 DIAGNOSIS — R05.1 ACUTE COUGH: ICD-10-CM

## 2025-01-08 DIAGNOSIS — J10.1 INFLUENZA A: Primary | ICD-10-CM

## 2025-01-08 DIAGNOSIS — R52 BODY ACHES: ICD-10-CM

## 2025-01-08 RX ORDER — BENZONATATE 200 MG/1
200 CAPSULE ORAL 3 TIMES DAILY PRN
Qty: 30 CAPSULE | Refills: 0 | Status: SHIPPED | OUTPATIENT
Start: 2025-01-08 | End: 2025-01-18

## 2025-01-08 RX ORDER — CODEINE PHOSPHATE AND GUAIFENESIN 10; 100 MG/5ML; MG/5ML
5 SOLUTION ORAL 3 TIMES DAILY PRN
Qty: 118 ML | Refills: 0 | Status: SHIPPED | OUTPATIENT
Start: 2025-01-08 | End: 2025-01-18

## 2025-01-08 NOTE — PROGRESS NOTES
Assessment and Plan:    1. Influenza A (Primary)  - benzonatate (TESSALON) 200 MG capsule; Take 1 capsule (200 mg total) by mouth 3 (three) times daily as needed.  Dispense: 30 capsule; Refill: 0  - guaiFENesin-codeine 100-10 mg/5 ml (TUSSI-ORGANIDIN NR)  mg/5 mL syrup; Take 5 mLs by mouth 3 (three) times daily as needed for Cough.  Dispense: 118 mL; Refill: 0    2. Body aches  - POCT COVID-19 Rapid Screening  - POCT Influenza A/B Molecular    3. Acute cough  - benzonatate (TESSALON) 200 MG capsule; Take 1 capsule (200 mg total) by mouth 3 (three) times daily as needed.  Dispense: 30 capsule; Refill: 0  - guaiFENesin-codeine 100-10 mg/5 ml (TUSSI-ORGANIDIN NR)  mg/5 mL syrup; Take 5 mLs by mouth 3 (three) times daily as needed for Cough.  Dispense: 118 mL; Refill: 0      ______________________________________________________________________  Subjective:    Chief Complaint:  Flu like symptoms    HPI:  Nilesh is a 69 y.o. year old male here for evaluation of flu like symptoms.    Visit initially started as virtual, but patient came to clinic and was examined in person after testing.    His symptoms started 4 days ago. Started with only scratchy throat, then worsened the next day. He is having chills and fever, having body aches. Chest congestion and a lot of cough.     He had been around his daughter who had similar symptoms and was reportedly negative for COVID and flu. He has not had a flu shot.    Medications:  Current Outpatient Medications on File Prior to Visit   Medication Sig Dispense Refill    amLODIPine (NORVASC) 5 MG tablet Take 1 tablet (5 mg total) by mouth 2 (two) times daily. 180 tablet 3    aspirin (ECOTRIN) 81 MG EC tablet Take 1 tablet (81 mg total) by mouth once daily.  0    atorvastatin (LIPITOR) 80 MG tablet Take 1 tablet (80 mg total) by mouth every evening. 90 tablet 3    benzonatate (TESSALON) 100 MG capsule 1 - 2 po every 6 hours prn cough (Patient not taking: Reported on  10/16/2024) 45 capsule 0    ezetimibe (ZETIA) 10 mg tablet Take 1 tablet (10 mg total) by mouth once daily. 90 tablet 3    hydroCHLOROthiazide (HYDRODIURIL) 25 MG tablet Take 1 tablet (25 mg total) by mouth once daily. 90 tablet 3    lisinopriL (PRINIVIL,ZESTRIL) 20 MG tablet Take 1 tablet (20 mg total) by mouth 2 (two) times daily. 180 tablet 3    metoprolol succinate (TOPROL-XL) 50 MG 24 hr tablet Take 1 tablet (50 mg total) by mouth every evening. 90 tablet 3    nitroGLYCERIN (NITROSTAT) 0.3 MG SL tablet Place 1 tablet (0.3 mg total) under the tongue every 5 (five) minutes as needed for Chest pain. 100 tablet 3    pantoprazole (PROTONIX) 40 MG tablet Take 1 tablet (40 mg total) by mouth once daily. 90 tablet 3    sertraline (ZOLOFT) 100 MG tablet Take 1 tablet (100 mg total) by mouth once daily. 90 tablet 3    tadalafiL (CIALIS) 20 MG Tab Take 1 tablet (20 mg total) by mouth once daily. (Patient not taking: Reported on 10/16/2024) 30 tablet 11     No current facility-administered medications on file prior to visit.       Review of Systems:  Review of Systems   Constitutional:  Positive for fever.   HENT:  Positive for congestion and sore throat.    Respiratory:  Positive for cough and wheezing.    Cardiovascular:  Positive for chest pain.   Gastrointestinal:  Positive for abdominal pain.   Neurological:  Positive for headaches.     Entered by patient and reviewed and updated during visit      Past Medical History:  Past Medical History:   Diagnosis Date    Allergic rhinosinusitis     Anxiety     Back pain     with spasms    Diverticulitis     s/p partial colectomy    GERD (gastroesophageal reflux disease)     Hx of colonic polyps        Objective:    Vitals:  There were no vitals filed for this visit.    Physical Exam  Vitals reviewed.   Constitutional:       General: He is not in acute distress.     Appearance: He is well-developed.   Eyes:      Conjunctiva/sclera: Conjunctivae normal.   Cardiovascular:       Rate and Rhythm: Normal rate and regular rhythm.   Pulmonary:      Effort: Pulmonary effort is normal. No respiratory distress.      Breath sounds: Normal breath sounds. No wheezing or rales.   Skin:     General: Skin is warm and dry.   Neurological:      Mental Status: He is alert and oriented to person, place, and time.   Psychiatric:         Mood and Affect: Mood normal.         Behavior: Behavior normal.         Data:  Flu A positive      Flavia Lange MD  Internal Medicine

## 2025-01-09 ENCOUNTER — PATIENT MESSAGE (OUTPATIENT)
Dept: FAMILY MEDICINE | Facility: CLINIC | Age: 70
End: 2025-01-09
Payer: MEDICARE

## 2025-01-12 ENCOUNTER — PATIENT MESSAGE (OUTPATIENT)
Dept: FAMILY MEDICINE | Facility: CLINIC | Age: 70
End: 2025-01-12
Payer: MEDICARE

## 2025-01-13 PROBLEM — Z71.89 ACP (ADVANCE CARE PLANNING): Status: ACTIVE | Noted: 2025-01-13

## 2025-01-13 PROBLEM — E87.6 HYPOKALEMIA: Status: ACTIVE | Noted: 2025-01-13

## 2025-01-13 PROBLEM — N17.9 AKI (ACUTE KIDNEY INJURY): Status: ACTIVE | Noted: 2025-01-13

## 2025-01-13 PROBLEM — I48.91 ATRIAL FIBRILLATION: Status: ACTIVE | Noted: 2025-01-13

## 2025-01-13 PROBLEM — J10.1 INFLUENZA A: Status: ACTIVE | Noted: 2025-01-13

## 2025-01-14 DIAGNOSIS — Z00.00 ENCOUNTER FOR MEDICARE ANNUAL WELLNESS EXAM: ICD-10-CM

## 2025-01-14 PROBLEM — I25.119 CORONARY ARTERY DISEASE INVOLVING NATIVE CORONARY ARTERY OF NATIVE HEART WITH ANGINA PECTORIS: Status: ACTIVE | Noted: 2025-01-14

## 2025-01-16 ENCOUNTER — OFFICE VISIT (OUTPATIENT)
Dept: CARDIOLOGY | Facility: CLINIC | Age: 70
End: 2025-01-16
Payer: MEDICARE

## 2025-01-16 ENCOUNTER — PATIENT MESSAGE (OUTPATIENT)
Dept: CARDIOLOGY | Facility: CLINIC | Age: 70
End: 2025-01-16

## 2025-01-16 ENCOUNTER — TELEPHONE (OUTPATIENT)
Dept: CARDIOLOGY | Facility: CLINIC | Age: 70
End: 2025-01-16
Payer: MEDICARE

## 2025-01-16 VITALS
WEIGHT: 249.56 LBS | BODY MASS INDEX: 33.08 KG/M2 | SYSTOLIC BLOOD PRESSURE: 113 MMHG | HEART RATE: 90 BPM | DIASTOLIC BLOOD PRESSURE: 75 MMHG | HEIGHT: 73 IN

## 2025-01-16 DIAGNOSIS — I34.0 NONRHEUMATIC MITRAL VALVE REGURGITATION: ICD-10-CM

## 2025-01-16 DIAGNOSIS — N18.31 STAGE 3A CHRONIC KIDNEY DISEASE: ICD-10-CM

## 2025-01-16 DIAGNOSIS — I25.10 CORONARY ARTERY DISEASE DUE TO LIPID RICH PLAQUE: ICD-10-CM

## 2025-01-16 DIAGNOSIS — R06.02 SOB (SHORTNESS OF BREATH): Primary | ICD-10-CM

## 2025-01-16 DIAGNOSIS — E66.811 OBESITY, CLASS I, BMI 30-34.9: Chronic | ICD-10-CM

## 2025-01-16 DIAGNOSIS — I25.83 CORONARY ARTERY DISEASE DUE TO LIPID RICH PLAQUE: ICD-10-CM

## 2025-01-16 DIAGNOSIS — I48.91 NEW ONSET ATRIAL FIBRILLATION: ICD-10-CM

## 2025-01-16 DIAGNOSIS — I50.32 CHRONIC DIASTOLIC HEART FAILURE: Chronic | ICD-10-CM

## 2025-01-16 PROCEDURE — 99999 PR PBB SHADOW E&M-EST. PATIENT-LVL IV: CPT | Mod: PBBFAC,,, | Performed by: INTERNAL MEDICINE

## 2025-01-16 PROCEDURE — 99215 OFFICE O/P EST HI 40 MIN: CPT | Mod: S$PBB,,, | Performed by: INTERNAL MEDICINE

## 2025-01-16 PROCEDURE — 99214 OFFICE O/P EST MOD 30 MIN: CPT | Mod: PBBFAC,PO | Performed by: INTERNAL MEDICINE

## 2025-01-16 RX ORDER — MUPIROCIN 20 MG/G
OINTMENT TOPICAL
Status: CANCELLED | OUTPATIENT
Start: 2025-01-16

## 2025-01-16 RX ORDER — SODIUM CHLORIDE 9 MG/ML
INJECTION, SOLUTION INTRAVENOUS CONTINUOUS
Status: CANCELLED | OUTPATIENT
Start: 2025-01-16

## 2025-01-16 RX ORDER — SPIRONOLACTONE 25 MG/1
12.5 TABLET ORAL DAILY
Qty: 45 TABLET | Refills: 0 | Status: SHIPPED | OUTPATIENT
Start: 2025-01-16

## 2025-01-16 NOTE — PATIENT INSTRUCTIONS
Cardioversion/ADIEL    Arrive for your procedure at:  [facility], check in at building number 2, 1st floor   Tentative Date: February 4th at 12pm (Arrival for 11am)      FASTING:  You MAY NOT have anything to eat AFTER MIDNIGHT. YOU MAY continue with CLEAR LIQUIDS ONLY (water, clear juice, sprite/7up, Gatorade)  up to 2 hours before your procedure.  If your procedure is scheduled in the afternoon, you may have a LIGHT BREAKFAST BEFORE 6:00 A.M.  For example: Two slices of toast; black coffee or black tea.    MEDICATIONS:  You may take your regular morning medications with a small sip of water.      Hold or adjust the following:                -Fluid pills. Hold morning of procedure    -Diabetes medications - Hold morning of procedure.     Continue: Eliquis, Xarelto, Coumadin, Plavix, Effient, Aspirin and other anti-coagulants/blood thinners PLEASE TAKE MORNING OF THE PROCEDURE    Please refer to pre-op instructions received from the surgery center. YOU WILL NEED SOMEONE TO DRIVE YOU HOME.

## 2025-01-16 NOTE — PROGRESS NOTES
Subjective:    Patient ID:  Nilesh Sanchez is a 69 y.o. male who presents for Hospital Follow Up, Atrial Fibrillation, and Shortness of Breath        HPI  NEW PATIENT EVALUATION, STATUS POST ST PH WITH AFIB, SHORTNESS OF BREATH ECHO MILD MR PA PRESSURE 30 MM OF MERCURY, WAS DC YESTERDAY, RECENT COLD, MORE COUGH SOME SOB WITH A FIB, WAS SEEING CARDIOLOGY IN NO, GFR 58 UP, ELEVATED LFT'S, CATH IN EARLY 2023 60% LAD STENOSIS, TRANSIENT BRIEF NOSEBLEED, UNABLE TO LAY FLAT, SEE ROS  eft Ventricle: The left ventricle is normal in size. Mildly increased wall thickness. There is normal systolic function with a visually estimated ejection fraction of 60 - 65%.    Right Ventricle: Normal right ventricular cavity size. Wall thickness is normal. Systolic function is normal.    Left Atrium: Left atrium is mildly dilated.    Aortic Valve: There is mild aortic valve sclerosis.    Mitral Valve: There is mild regurgitation.    Aorta: Aortic root is mildly dilated.    Pulmonary Artery: There is borderline elevated pulmonary hypertension. The estimated pulmonary artery systolic pressure is 30 mmHg.    IVC/SVC: Normal venous pressure at 3 mmHg.  Past Medical History:   Diagnosis Date    A-fib     Allergic rhinosinusitis     Anxiety     Back pain     with spasms    Diverticulitis     s/p partial colectomy    GERD (gastroesophageal reflux disease)     Hx of colonic polyps      Past Surgical History:   Procedure Laterality Date    ARTHROSCOPIC CHONDROPLASTY OF KNEE JOINT Left 08/22/2018    Procedure: ARTHROSCOPY, KNEE, WITH CHONDROPLASTY;  Surgeon: Buster Bansal MD;  Location: [facility] OR;  Service: Orthopedics;  Laterality: Left;    COLECTOMY      18in of sigmoid colon removed, about 2014, [facility]    COLONOSCOPY      COLONOSCOPY N/A 05/13/2022    Procedure: COLONOSCOPY;  Surgeon: Polo Diop MD;  Location: [facility] ENDO;  Service: Endoscopy;  Laterality: N/A;    CORONARY ANGIOGRAPHY N/A 3/6/2023    Procedure: ANGIOGRAM, CORONARY  ARTERY;  Surgeon: Uche Walton Jr., MD;  Location: [facility] CATH LAB;  Service: Cardiology;  Laterality: N/A;  low bleeding risk 0.9%    ESOPHAGOGASTRODUODENOSCOPY      KNEE ARTHROSCOPY W/ MENISCECTOMY Left 08/22/2018    Procedure: ARTHROSCOPY, KNEE, WITH MENISCECTOMY PARTIAL MEDIAL;  Surgeon: Buster Bansal MD;  Location: [facility] OR;  Service: Orthopedics;  Laterality: Left;  femoral     LUMBAR DISC SURGERY  12/07/2017    L4/5    SPINE SURGERY      UPPER GASTROINTESTINAL ENDOSCOPY       Family History   Problem Relation Name Age of Onset    Heart disease Mother mom     Arthritis Mother mom     Early death Mother mom     Hypertension Mother mom     Heart disease Father      Heart disease Sister      Atrial fibrillation Sister      Heart disease Brother       Social History     Socioeconomic History    Marital status:    Tobacco Use    Smoking status: Never    Smokeless tobacco: Never   Substance and Sexual Activity    Alcohol use: Yes     Alcohol/week: 2.0 standard drinks of alcohol     Types: 2 Glasses of wine per week     Comment: wine daily    Drug use: Never    Sexual activity: Yes     Partners: Female     Birth control/protection: None   Social History Narrative    Owns company making leather belts.      Social Drivers of Health     Financial Resource Strain: Low Risk  (1/13/2025)    Overall Financial Resource Strain (CARDIA)     Difficulty of Paying Living Expenses: Not hard at all   Food Insecurity: No Food Insecurity (1/13/2025)    Hunger Vital Sign     Worried About Running Out of Food in the Last Year: Never true     Ran Out of Food in the Last Year: Never true   Transportation Needs: No Transportation Needs (1/13/2025)    TRANSPORTATION NEEDS     Transportation : No   Physical Activity: Sufficiently Active (5/1/2024)    Exercise Vital Sign     Days of Exercise per Week: 2 days     Minutes of Exercise per Session: 120 min   Stress: No Stress Concern Present (1/13/2025)    [place] [employer]  Occupational Health - Occupational Stress Questionnaire     Feeling of Stress : Only a little   Housing Stability: Low Risk  (1/13/2025)    Housing Stability Vital Sign     Unable to Pay for Housing in the Last Year: No     Homeless in the Last Year: No       Review of patient's allergies indicates:  No Known Allergies    Current Outpatient Medications:     amLODIPine (NORVASC) 5 MG tablet, Take 1 tablet (5 mg total) by mouth 2 (two) times daily. Hold for SBP < 120, Disp: , Rfl:     apixaban (ELIQUIS) 5 mg Tab, Take 1 tablet (5 mg total) by mouth 2 (two) times daily., Disp: 60 tablet, Rfl: 0    aspirin (ECOTRIN) 81 MG EC tablet, Take 1 tablet (81 mg total) by mouth once daily., Disp: , Rfl: 0    atorvastatin (LIPITOR) 80 MG tablet, Take 1 tablet (80 mg total) by mouth every evening., Disp: 90 tablet, Rfl: 3    ezetimibe (ZETIA) 10 mg tablet, Take 1 tablet (10 mg total) by mouth once daily., Disp: 90 tablet, Rfl: 3    guaiFENesin-codeine 100-10 mg/5 ml (TUSSI-ORGANIDIN NR)  mg/5 mL syrup, Take 5 mLs by mouth 3 (three) times daily as needed for Cough., Disp: 118 mL, Rfl: 0    loperamide (IMODIUM) 2 mg capsule, Take 1 capsule (2 mg total) by mouth 4 (four) times daily as needed for Diarrhea., Disp: , Rfl:     metoprolol succinate (TOPROL-XL) 50 MG 24 hr tablet, Take 1 tablet (50 mg total) by mouth every evening., Disp: 90 tablet, Rfl: 3    nitroGLYCERIN (NITROSTAT) 0.3 MG SL tablet, Place 1 tablet (0.3 mg total) under the tongue every 5 (five) minutes as needed for Chest pain., Disp: 100 tablet, Rfl: 3    pantoprazole (PROTONIX) 40 MG tablet, Take 1 tablet (40 mg total) by mouth once daily., Disp: 90 tablet, Rfl: 3    sertraline (ZOLOFT) 100 MG tablet, Take 1 tablet (100 mg total) by mouth once daily., Disp: 90 tablet, Rfl: 3    tadalafiL (CIALIS) 20 MG Tab, Take 1 tablet (20 mg total) by mouth daily as needed (erectile dysfunction)., Disp: , Rfl:     benzonatate (TESSALON) 200 MG capsule, Take 1 capsule (200 mg  "total) by mouth 3 (three) times daily as needed. (Patient not taking: Reported on 1/16/2025), Disp: 30 capsule, Rfl: 0    spironolactone (ALDACTONE) 25 MG tablet, Take 0.5 tablets (12.5 mg total) by mouth once daily., Disp: 45 tablet, Rfl: 0    Review of Systems   Constitutional: Negative for chills, decreased appetite, diaphoresis, fever, malaise/fatigue and weight gain.   HENT:  Negative for congestion and nosebleeds.    Eyes:  Negative for blurred vision and visual disturbance.   Cardiovascular:  Positive for dyspnea on exertion, irregular heartbeat, orthopnea and paroxysmal nocturnal dyspnea. Negative for chest pain, leg swelling (VENOUS INSUFFICIENCY), near-syncope, palpitations and syncope.   Respiratory:  Positive for cough and shortness of breath. Negative for wheezing.    Endocrine: Negative for polyphagia and polyuria.   Skin:  Negative for color change and skin cancer.   Musculoskeletal:  Positive for back pain. Negative for falls.   Gastrointestinal:  Negative for abdominal pain and dysphagia.   Genitourinary:  Negative for dysuria and flank pain.   Neurological:  Negative for brief paralysis, light-headedness and weakness.   Psychiatric/Behavioral:  Negative for altered mental status and depression.         Objective:      Vitals:    01/16/25 1157   BP: 113/75   Pulse: 90   Weight: 113.2 kg (249 lb 9 oz)   Height: 6' 1" (1.854 m)   PainSc: 0-No pain     Body mass index is 32.93 kg/m².    Physical Exam  Constitutional:       Appearance: He is obese.   HENT:      Head: Normocephalic and atraumatic.   Eyes:      Extraocular Movements: Extraocular movements intact.      Conjunctiva/sclera: Conjunctivae normal.   Neck:      Vascular: No carotid bruit.   Cardiovascular:      Rate and Rhythm: Normal rate. Rhythm irregular.      Heart sounds: Murmur heard.      No friction rub. No gallop.   Pulmonary:      Effort: Pulmonary effort is normal.      Breath sounds: Rhonchi and rales present.   Abdominal:      " Palpations: Abdomen is soft.      Tenderness: There is no abdominal tenderness.   Musculoskeletal:      Cervical back: Neck supple.      Right lower leg: No edema.      Left lower leg: No edema.   Skin:     Capillary Refill: Capillary refill takes less than 2 seconds.   Neurological:      General: No focal deficit present.      Mental Status: He is alert and oriented to person, place, and time.   Psychiatric:         Mood and Affect: Mood normal.         Behavior: Behavior normal.                 ..    Chemistry        Component Value Date/Time     01/15/2025 0419    K 3.8 01/15/2025 0419     01/15/2025 0419    CO2 28 01/15/2025 0419    BUN 41 (H) 01/15/2025 0419    CREATININE 1.29 01/15/2025 0419     01/15/2025 0419        Component Value Date/Time    CALCIUM 8.9 01/15/2025 0419    ALKPHOS 95 01/15/2025 0419    AST 48 (H) 01/15/2025 0419    ALT 45 (H) 01/15/2025 0419    BILITOT 0.6 01/15/2025 0419    ESTGFRAFRICA >60.0 12/09/2021 0725    EGFRNONAA >60.0 12/09/2021 0725            ..  Lab Results   Component Value Date    CHOL 147 06/21/2024    CHOL 211 (H) 05/02/2024    CHOL 205 (H) 06/14/2023     Lab Results   Component Value Date    HDL 47 06/21/2024    HDL 51 05/02/2024    HDL 51 06/14/2023     Lab Results   Component Value Date    LDLCALC 65.8 06/21/2024    LDLCALC 115.0 05/02/2024    LDLCALC 99.6 06/14/2023     Lab Results   Component Value Date    TRIG 171 (H) 06/21/2024    TRIG 225 (H) 05/02/2024    TRIG 272 (H) 06/14/2023     Lab Results   Component Value Date    CHOLHDL 32.0 06/21/2024    CHOLHDL 24.2 05/02/2024    CHOLHDL 24.9 06/14/2023     ..  Lab Results   Component Value Date    WBC 7.23 01/15/2025    HGB 12.6 (L) 01/15/2025    HCT 37.9 (L) 01/15/2025    MCV 92 01/15/2025     01/15/2025       Test(s) Reviewed  I have reviewed the following in detail:  [] Stress test   [x] Angiography   [] Echocardiogram   [x] Labs   [x] Other:       Assessment:         ICD-10-CM ICD-9-CM   1.  SOB (shortness of breath)  R06.02 786.05   2. New onset atrial fibrillation  I48.91 427.31   3. Coronary artery disease due to lipid rich plaque  I25.10 414.00    I25.83 414.3   4. Obesity, Class I, BMI 30-34.9  E66.811 278.00   5. Chronic diastolic heart failure  I50.32 428.32   6. Nonrheumatic mitral valve regurgitation  I34.0 424.0   7. Stage 3a chronic kidney disease  N18.31 585.3     Problem List Items Addressed This Visit          Pulmonary    SOB (shortness of breath) - Primary    Relevant Orders    Nuclear Stress - Cardiology Interpreted    Chlorhexidine (CHG) 2% Wipes    Oxygen PRN    Pulse Oximetry Q4H    Case Request-Cath Lab: TRANSESOPHAGEAL ECHOCARDIOGRAM WITH POSSIBLE CARDIOVERSION (ADIEL W/ POSS CARDIOVERSION) (Completed)    Transesophageal echo (ADIEL) with possible cardioversion       Cardiac/Vascular    Chronic diastolic heart failure (Chronic)    Relevant Orders    Chlorhexidine (CHG) 2% Wipes    Oxygen PRN    Pulse Oximetry Q4H    Case Request-Cath Lab: TRANSESOPHAGEAL ECHOCARDIOGRAM WITH POSSIBLE CARDIOVERSION (ADIEL W/ POSS CARDIOVERSION) (Completed)    Transesophageal echo (ADIEL) with possible cardioversion    New onset atrial fibrillation    Relevant Orders    Nuclear Stress - Cardiology Interpreted    Chlorhexidine (CHG) 2% Wipes    Oxygen PRN    Pulse Oximetry Q4H    Case Request-Cath Lab: TRANSESOPHAGEAL ECHOCARDIOGRAM WITH POSSIBLE CARDIOVERSION (ADIEL W/ POSS CARDIOVERSION) (Completed)    Transesophageal echo (ADIEL) with possible cardioversion    Coronary artery disease due to lipid rich plaque    Relevant Orders    Nuclear Stress - Cardiology Interpreted    Chlorhexidine (CHG) 2% Wipes    Oxygen PRN    Pulse Oximetry Q4H    Case Request-Cath Lab: TRANSESOPHAGEAL ECHOCARDIOGRAM WITH POSSIBLE CARDIOVERSION (ADIEL W/ POSS CARDIOVERSION) (Completed)    Transesophageal echo (ADIEL) with possible cardioversion    Nonrheumatic mitral valve regurgitation    Relevant Orders    Chlorhexidine (CHG) 2% Wipes     Oxygen PRN    Pulse Oximetry Q4H    Case Request-Cath Lab: TRANSESOPHAGEAL ECHOCARDIOGRAM WITH POSSIBLE CARDIOVERSION (ADIEL W/ POSS CARDIOVERSION) (Completed)    Transesophageal echo (ADIEL) with possible cardioversion       Renal/    Stage 3a chronic kidney disease    Relevant Orders    Chlorhexidine (CHG) 2% Wipes    Oxygen PRN    Pulse Oximetry Q4H    Case Request-Cath Lab: TRANSESOPHAGEAL ECHOCARDIOGRAM WITH POSSIBLE CARDIOVERSION (ADIEL W/ POSS CARDIOVERSION) (Completed)    Transesophageal echo (ADIEL) with possible cardioversion       Endocrine    Obesity, Class I, BMI 30-34.9    Relevant Orders    Chlorhexidine (CHG) 2% Wipes    Oxygen PRN    Pulse Oximetry Q4H    Case Request-Cath Lab: TRANSESOPHAGEAL ECHOCARDIOGRAM WITH POSSIBLE CARDIOVERSION (ADIEL W/ POSS CARDIOVERSION) (Completed)    Transesophageal echo (ADIEL) with possible cardioversion        Plan:       ADD SPIRONOLACTONE , CLINICAL EVIDENCE OF HEART FAILURE, WAS ON HYDROCHLOROTHIAZIDE AT HOME WAS STOPPED RECOMMEND TO TAKE IT FOR 2 3 DAYS ONLY AND THEN ALSO START SPIRONOLACTONE DAILY WEIGHT 2 LB PER DAY 5 LB PER WEEK RULE EXPLAINED THE PATIENT AND HIS WIFE, NUCLEAR STRESS TEST ASSESS FOR ISCHEMIA STAY ON ANTICOAGULATION AND THEN ADIEL CARDIOVERSION IN 2-3 WEEKS ON MEDS  ALL OTHER CV CLINICALLY STABLE,ASSESS  [place], CLASS 2  HF, NO TIA,  NEW  CLINICAL ARRHYTHMIA,CONTINUE CURRENT MEDS, EDUCATION, DIET, EXERCISE ,        SOB (shortness of breath)  -     Nuclear Stress - Cardiology Interpreted; Future  -     Cancel: Case Request Endoscopy: Transesophageal echo (ADIEL) intra-procedure log documentation, Cardioversion or Defibrillation  -     Cancel: Vital signs; Standing  -     Cancel: Cleanse with Chlorhexidine (CHG); Standing  -     Cancel: Diet NPO; Standing  -     Cancel: Chlorohexidine Gluconate Bath; Standing  -     Cancel: Full code; Standing  -     Cancel: Place in Outpatient; Standing  -     Cancel: Place sequential compression device; Standing  -      Cancel: EKG 12-lead; Standing  -     Vital signs per unit routine; Standing  -     Chlorhexidine (CHG) 2% Wipes; Standing  -     Height and weight; Standing  -     Verify Informed Consent; Standing  -     Smoking Cessation Education; Standing  -     Emergency Equipment at Bedside; Standing  -     Patient to Void on Call Prior to Cardioversion; Standing  -     Clip Chest and Back Hair if Necessary; Standing  -     Insert Peripheral IV (18 Gauge Saline Lock); Standing  -     NOTIFY PHYSICIAN PROC; Standing  -     Diet NPO; Standing  -     Oxygen PRN; Standing  -     Pulse Oximetry Q4H; Standing  -     EKG 12-LEAD; Standing  -     Case Request-Cath Lab: TRANSESOPHAGEAL ECHOCARDIOGRAM WITH POSSIBLE CARDIOVERSION (ADIEL W/ POSS CARDIOVERSION)  -     Transesophageal echo (ADIEL) with possible cardioversion; Future  -     Place in Outpatient; Standing    New onset atrial fibrillation  -     Nuclear Stress - Cardiology Interpreted; Future  -     Cancel: Case Request Endoscopy: Transesophageal echo (ADIEL) intra-procedure log documentation, Cardioversion or Defibrillation  -     Cancel: Vital signs; Standing  -     Cancel: Cleanse with Chlorhexidine (CHG); Standing  -     Cancel: Diet NPO; Standing  -     Cancel: Chlorohexidine Gluconate Bath; Standing  -     Cancel: Full code; Standing  -     Cancel: Place in Outpatient; Standing  -     Cancel: Place sequential compression device; Standing  -     Cancel: EKG 12-lead; Standing  -     Vital signs per unit routine; Standing  -     Chlorhexidine (CHG) 2% Wipes; Standing  -     Height and weight; Standing  -     Verify Informed Consent; Standing  -     Smoking Cessation Education; Standing  -     Emergency Equipment at Bedside; Standing  -     Patient to Void on Call Prior to Cardioversion; Standing  -     Clip Chest and Back Hair if Necessary; Standing  -     Insert Peripheral IV (18 Gauge Saline Lock); Standing  -     NOTIFY PHYSICIAN PROC; Standing  -     Diet NPO; Standing  -      Oxygen PRN; Standing  -     Pulse Oximetry Q4H; Standing  -     EKG 12-LEAD; Standing  -     Case Request-Cath Lab: TRANSESOPHAGEAL ECHOCARDIOGRAM WITH POSSIBLE CARDIOVERSION (ADIEL W/ POSS CARDIOVERSION)  -     Transesophageal echo (ADIEL) with possible cardioversion; Future  -     Place in Outpatient; Standing    Coronary artery disease due to lipid rich plaque  -     Nuclear Stress - Cardiology Interpreted; Future  -     Cancel: Vital signs; Standing  -     Cancel: Cleanse with Chlorhexidine (CHG); Standing  -     Cancel: Diet NPO; Standing  -     Cancel: Chlorohexidine Gluconate Bath; Standing  -     Cancel: Full code; Standing  -     Cancel: Place in Outpatient; Standing  -     Cancel: Place sequential compression device; Standing  -     Cancel: EKG 12-lead; Standing  -     Vital signs per unit routine; Standing  -     Chlorhexidine (CHG) 2% Wipes; Standing  -     Height and weight; Standing  -     Verify Informed Consent; Standing  -     Smoking Cessation Education; Standing  -     Emergency Equipment at Bedside; Standing  -     Patient to Void on Call Prior to Cardioversion; Standing  -     Clip Chest and Back Hair if Necessary; Standing  -     Insert Peripheral IV (18 Gauge Saline Lock); Standing  -     NOTIFY PHYSICIAN PROC; Standing  -     Diet NPO; Standing  -     Oxygen PRN; Standing  -     Pulse Oximetry Q4H; Standing  -     EKG 12-LEAD; Standing  -     Case Request-Cath Lab: TRANSESOPHAGEAL ECHOCARDIOGRAM WITH POSSIBLE CARDIOVERSION (ADIEL W/ POSS CARDIOVERSION)  -     Transesophageal echo (ADIEL) with possible cardioversion; Future  -     Place in Outpatient; Standing    Obesity, Class I, BMI 30-34.9  -     Cancel: Vital signs; Standing  -     Cancel: Cleanse with Chlorhexidine (CHG); Standing  -     Cancel: Diet NPO; Standing  -     Cancel: Chlorohexidine Gluconate Bath; Standing  -     Cancel: Full code; Standing  -     Cancel: Place in Outpatient; Standing  -     Cancel: Place sequential compression  device; Standing  -     Cancel: EKG 12-lead; Standing  -     Vital signs per unit routine; Standing  -     Chlorhexidine (CHG) 2% Wipes; Standing  -     Height and weight; Standing  -     Verify Informed Consent; Standing  -     Smoking Cessation Education; Standing  -     Emergency Equipment at Bedside; Standing  -     Patient to Void on Call Prior to Cardioversion; Standing  -     Clip Chest and Back Hair if Necessary; Standing  -     Insert Peripheral IV (18 Gauge Saline Lock); Standing  -     NOTIFY PHYSICIAN PROC; Standing  -     Diet NPO; Standing  -     Oxygen PRN; Standing  -     Pulse Oximetry Q4H; Standing  -     EKG 12-LEAD; Standing  -     Case Request-Cath Lab: TRANSESOPHAGEAL ECHOCARDIOGRAM WITH POSSIBLE CARDIOVERSION (ADIEL W/ POSS CARDIOVERSION)  -     Transesophageal echo (ADIEL) with possible cardioversion; Future  -     Place in Outpatient; Standing    Chronic diastolic heart failure  -     Cancel: Vital signs; Standing  -     Cancel: Cleanse with Chlorhexidine (CHG); Standing  -     Cancel: Diet NPO; Standing  -     Cancel: Chlorohexidine Gluconate Bath; Standing  -     Cancel: Full code; Standing  -     Cancel: Place in Outpatient; Standing  -     Cancel: Place sequential compression device; Standing  -     Cancel: EKG 12-lead; Standing  -     Vital signs per unit routine; Standing  -     Chlorhexidine (CHG) 2% Wipes; Standing  -     Height and weight; Standing  -     Verify Informed Consent; Standing  -     Smoking Cessation Education; Standing  -     Emergency Equipment at Bedside; Standing  -     Patient to Void on Call Prior to Cardioversion; Standing  -     Clip Chest and Back Hair if Necessary; Standing  -     Insert Peripheral IV (18 Gauge Saline Lock); Standing  -     NOTIFY PHYSICIAN PROC; Standing  -     Diet NPO; Standing  -     Oxygen PRN; Standing  -     Pulse Oximetry Q4H; Standing  -     EKG 12-LEAD; Standing  -     Case Request-Cath Lab: TRANSESOPHAGEAL ECHOCARDIOGRAM WITH POSSIBLE  CARDIOVERSION (ADIEL W/ POSS CARDIOVERSION)  -     Transesophageal echo (ADIEL) with possible cardioversion; Future  -     Place in Outpatient; Standing    Nonrheumatic mitral valve regurgitation  -     Cancel: Case Request Endoscopy: Transesophageal echo (ADIEL) intra-procedure log documentation, Cardioversion or Defibrillation  -     Cancel: Vital signs; Standing  -     Cancel: Cleanse with Chlorhexidine (CHG); Standing  -     Cancel: Diet NPO; Standing  -     Cancel: Chlorohexidine Gluconate Bath; Standing  -     Cancel: Full code; Standing  -     Cancel: Place in Outpatient; Standing  -     Cancel: Place sequential compression device; Standing  -     Cancel: EKG 12-lead; Standing  -     Vital signs per unit routine; Standing  -     Chlorhexidine (CHG) 2% Wipes; Standing  -     Height and weight; Standing  -     Verify Informed Consent; Standing  -     Smoking Cessation Education; Standing  -     Emergency Equipment at Bedside; Standing  -     Patient to Void on Call Prior to Cardioversion; Standing  -     Clip Chest and Back Hair if Necessary; Standing  -     Insert Peripheral IV (18 Gauge Saline Lock); Standing  -     NOTIFY PHYSICIAN PROC; Standing  -     Diet NPO; Standing  -     Oxygen PRN; Standing  -     Pulse Oximetry Q4H; Standing  -     EKG 12-LEAD; Standing  -     Case Request-Cath Lab: TRANSESOPHAGEAL ECHOCARDIOGRAM WITH POSSIBLE CARDIOVERSION (ADIEL W/ POSS CARDIOVERSION)  -     Transesophageal echo (ADIEL) with possible cardioversion; Future  -     Place in Outpatient; Standing    Stage 3a chronic kidney disease  -     Cancel: Vital signs; Standing  -     Cancel: Cleanse with Chlorhexidine (CHG); Standing  -     Cancel: Diet NPO; Standing  -     Cancel: Chlorohexidine Gluconate Bath; Standing  -     Cancel: Full code; Standing  -     Cancel: Place in Outpatient; Standing  -     Cancel: Place sequential compression device; Standing  -     Cancel: EKG 12-lead; Standing  -     Vital signs per unit routine;  Standing  -     Chlorhexidine (CHG) 2% Wipes; Standing  -     Height and weight; Standing  -     Verify Informed Consent; Standing  -     Smoking Cessation Education; Standing  -     Emergency Equipment at Bedside; Standing  -     Patient to Void on Call Prior to Cardioversion; Standing  -     Clip Chest and Back Hair if Necessary; Standing  -     Insert Peripheral IV (18 Gauge Saline Lock); Standing  -     NOTIFY PHYSICIAN PROC; Standing  -     Diet NPO; Standing  -     Oxygen PRN; Standing  -     Pulse Oximetry Q4H; Standing  -     EKG 12-LEAD; Standing  -     Case Request-Cath Lab: TRANSESOPHAGEAL ECHOCARDIOGRAM WITH POSSIBLE CARDIOVERSION (ADIEL W/ POSS CARDIOVERSION)  -     Transesophageal echo (ADIEL) with possible cardioversion; Future  -     Place in Outpatient; Standing    Other orders  -     spironolactone (ALDACTONE) 25 MG tablet; Take 0.5 tablets (12.5 mg total) by mouth once daily.  Dispense: 45 tablet; Refill: 0  -     Cancel: 0.9% NaCl infusion  -     Cancel: IP VTE HIGH RISK PATIENT; Standing  -     Cancel: mupirocin 2 % ointment    RTC Low level/low impact aerobic exercise 5x's/wk. Heart healthy diet and risk factor modification.    See labs and med orders.    Aerobic exercise 5x's/wk. Heart healthy diet and risk factor modification.    See labs and med orders.

## 2025-01-16 NOTE — TELEPHONE ENCOUNTER
Called pts wife, Marybeth, to update on ADIEL/DCCV location, date, and time. Informed her that it will be done at [facility] on February 6th for 10am. Gave approximate arrival time and updated instructions via phone call, and sent [employer]. Marybeth V/U.

## 2025-01-21 DIAGNOSIS — I10 ESSENTIAL HYPERTENSION: ICD-10-CM

## 2025-01-21 DIAGNOSIS — E78.2 MIXED HYPERLIPIDEMIA: ICD-10-CM

## 2025-01-22 ENCOUNTER — PATIENT MESSAGE (OUTPATIENT)
Dept: CARDIOLOGY | Facility: CLINIC | Age: 70
End: 2025-01-22
Payer: MEDICARE

## 2025-01-22 DIAGNOSIS — R60.9 EDEMA, UNSPECIFIED TYPE: ICD-10-CM

## 2025-01-22 DIAGNOSIS — I50.32 CHRONIC DIASTOLIC HEART FAILURE: Primary | Chronic | ICD-10-CM

## 2025-01-23 DIAGNOSIS — E78.2 MIXED HYPERLIPIDEMIA: Chronic | ICD-10-CM

## 2025-01-23 RX ORDER — FUROSEMIDE 20 MG/1
20 TABLET ORAL DAILY
Qty: 30 TABLET | Refills: 1 | Status: SHIPPED | OUTPATIENT
Start: 2025-01-23 | End: 2026-01-23

## 2025-01-23 NOTE — TELEPHONE ENCOUNTER
No care due was identified.  [facility] Embedded Care Due Messages. Reference number: [id number].   1/23/2025 11:31:52 AM CST

## 2025-01-23 NOTE — TELEPHONE ENCOUNTER
Refill Routing Note   Medication(s) are not appropriate for processing by [facility] for the following reason(s):        ED/Hospital Visit since last OV with provider    [facility] action(s):  Defer               Appointments  past 12m or future 3m with PCP    Date Provider   Last Visit   1/8/2025 Flavia Lange MD   Next Visit   Visit date not found Flavia Lange MD   ED visits in past 90 days: 0        Note composed:2:06 PM 01/23/2025

## 2025-01-24 ENCOUNTER — PATIENT MESSAGE (OUTPATIENT)
Dept: CARDIOLOGY | Facility: HOSPITAL | Age: 70
End: 2025-01-24
Payer: MEDICARE

## 2025-01-24 RX ORDER — ATORVASTATIN CALCIUM 80 MG/1
80 TABLET, FILM COATED ORAL NIGHTLY
Qty: 90 TABLET | Refills: 3 | Status: SHIPPED | OUTPATIENT
Start: 2025-01-24

## 2025-01-24 RX ORDER — LISINOPRIL 20 MG/1
20 TABLET ORAL 2 TIMES DAILY
Qty: 180 TABLET | Refills: 3 | Status: SHIPPED | OUTPATIENT
Start: 2025-01-24

## 2025-01-24 RX ORDER — HYDROCHLOROTHIAZIDE 25 MG/1
25 TABLET ORAL
Qty: 90 TABLET | Refills: 3 | Status: SHIPPED | OUTPATIENT
Start: 2025-01-24

## 2025-01-24 RX ORDER — EZETIMIBE 10 MG/1
10 TABLET ORAL DAILY
Qty: 90 TABLET | Refills: 1 | Status: SHIPPED | OUTPATIENT
Start: 2025-01-24 | End: 2025-01-27 | Stop reason: SDUPTHER

## 2025-01-24 RX ORDER — METOPROLOL SUCCINATE 50 MG/1
50 TABLET, EXTENDED RELEASE ORAL NIGHTLY
Qty: 90 TABLET | Refills: 3 | Status: SHIPPED | OUTPATIENT
Start: 2025-01-24

## 2025-01-26 ENCOUNTER — PATIENT MESSAGE (OUTPATIENT)
Dept: FAMILY MEDICINE | Facility: CLINIC | Age: 70
End: 2025-01-26
Payer: MEDICARE

## 2025-01-26 DIAGNOSIS — E78.2 MIXED HYPERLIPIDEMIA: Chronic | ICD-10-CM

## 2025-01-27 NOTE — TELEPHONE ENCOUNTER
No care due was identified.  [facility] Embedded Care Due Messages. Reference number: [id number].   1/27/2025 7:20:13 AM CST

## 2025-01-28 ENCOUNTER — HOSPITAL ENCOUNTER (OUTPATIENT)
Dept: CARDIOLOGY | Facility: HOSPITAL | Age: 70
Discharge: HOME | End: 2025-01-28
Attending: INTERNAL MEDICINE
Payer: MEDICARE

## 2025-01-28 ENCOUNTER — HOSPITAL ENCOUNTER (OUTPATIENT)
Dept: RADIOLOGY | Facility: HOSPITAL | Age: 70
Discharge: HOME | End: 2025-01-28
Attending: INTERNAL MEDICINE
Payer: MEDICARE

## 2025-01-28 VITALS — BODY MASS INDEX: 33 KG/M2 | WEIGHT: 249 LBS | HEIGHT: 73 IN

## 2025-01-28 DIAGNOSIS — I25.10 CORONARY ARTERY DISEASE DUE TO LIPID RICH PLAQUE: ICD-10-CM

## 2025-01-28 DIAGNOSIS — I25.83 CORONARY ARTERY DISEASE DUE TO LIPID RICH PLAQUE: ICD-10-CM

## 2025-01-28 DIAGNOSIS — I48.91 NEW ONSET ATRIAL FIBRILLATION: ICD-10-CM

## 2025-01-28 DIAGNOSIS — R06.02 SOB (SHORTNESS OF BREATH): ICD-10-CM

## 2025-01-28 LAB
CV PHARM DOSE: 0.4 MG
CV STRESS BASE HR: 55 BPM
DIASTOLIC BLOOD PRESSURE: 86 MMHG
NUC REST EJECTION FRACTION: 75
OHS CV CPX 1 MINUTE RECOVERY HEART RATE: 79 BPM
OHS CV CPX 85 PERCENT MAX PREDICTED HEART RATE MALE: 127
OHS CV CPX MAX PREDICTED HEART RATE: 149
OHS CV CPX PATIENT IS FEMALE: 0
OHS CV CPX PATIENT IS MALE: 1
OHS CV CPX PEAK DIASTOLIC BLOOD PRESSURE: 86 MMHG
OHS CV CPX PEAK HEAR RATE: 79 BPM
OHS CV CPX PEAK RATE PRESSURE PRODUCT: NORMAL
OHS CV CPX PEAK SYSTOLIC BLOOD PRESSURE: 161 MMHG
OHS CV CPX PERCENT MAX PREDICTED HEART RATE ACHIEVED: 53
OHS CV CPX RATE PRESSURE PRODUCT PRESENTING: 8855
OHS CV INITIAL DOSE: 16.2 MCG/KG/MIN
OHS CV PEAK DOSE: 49 MCG/KG/MIN
OHS CV PHARM TIME: 1407 MIN
SYSTOLIC BLOOD PRESSURE: 161 MMHG

## 2025-01-28 PROCEDURE — 93018 CV STRESS TEST I&R ONLY: CPT | Mod: S$PBB,,, | Performed by: INTERNAL MEDICINE

## 2025-01-28 PROCEDURE — 63600175 PHARM REV CODE 636 W HCPCS: Mod: PO | Performed by: INTERNAL MEDICINE

## 2025-01-28 PROCEDURE — 93017 CV STRESS TEST TRACING ONLY: CPT | Mod: PO

## 2025-01-28 PROCEDURE — 93017 CV STRESS TEST TRACING ONLY: CPT | Mod: PBBFAC,PO

## 2025-01-28 PROCEDURE — A9502 TC99M TETROFOSMIN: HCPCS | Mod: PO | Performed by: INTERNAL MEDICINE

## 2025-01-28 PROCEDURE — 78452 HT MUSCLE IMAGE SPECT MULT: CPT | Mod: PO

## 2025-01-28 PROCEDURE — 78452 HT MUSCLE IMAGE SPECT MULT: CPT | Mod: 26,,, | Performed by: INTERNAL MEDICINE

## 2025-01-28 PROCEDURE — 93016 CV STRESS TEST SUPVJ ONLY: CPT | Mod: S$PBB,,, | Performed by: INTERNAL MEDICINE

## 2025-01-28 RX ORDER — EZETIMIBE 10 MG/1
10 TABLET ORAL DAILY
Qty: 90 TABLET | Refills: 1 | Status: SHIPPED | OUTPATIENT
Start: 2025-01-28 | End: 2026-01-28

## 2025-01-28 RX ORDER — REGADENOSON 0.08 MG/ML
0.4 INJECTION, SOLUTION INTRAVENOUS
Status: COMPLETED | OUTPATIENT
Start: 2025-01-28 | End: 2025-01-28

## 2025-01-28 RX ADMIN — TETROFOSMIN 16.2 MILLICURIE: 1.38 INJECTION, POWDER, LYOPHILIZED, FOR SOLUTION INTRAVENOUS at 02:01

## 2025-01-28 RX ADMIN — TETROFOSMIN 49 MILLICURIE: 1.38 INJECTION, POWDER, LYOPHILIZED, FOR SOLUTION INTRAVENOUS at 02:01

## 2025-01-28 RX ADMIN — REGADENOSON 0.4 MG: 0.08 INJECTION, SOLUTION INTRAVENOUS at 02:01

## 2025-01-31 DIAGNOSIS — I10 ESSENTIAL HYPERTENSION: Primary | ICD-10-CM

## 2025-01-31 DIAGNOSIS — I48.91 NEW ONSET ATRIAL FIBRILLATION: ICD-10-CM

## 2025-02-03 ENCOUNTER — HOSPITAL ENCOUNTER (OUTPATIENT)
Dept: CARDIOLOGY | Facility: HOSPITAL | Age: 70
Discharge: HOME | End: 2025-02-03
Attending: INTERNAL MEDICINE
Payer: MEDICARE

## 2025-02-03 DIAGNOSIS — I48.91 NEW ONSET ATRIAL FIBRILLATION: ICD-10-CM

## 2025-02-03 DIAGNOSIS — I10 ESSENTIAL HYPERTENSION: ICD-10-CM

## 2025-02-03 PROCEDURE — 93243 EXT ECG>48HR<7D SCAN A/R: CPT | Mod: PO

## 2025-02-03 PROCEDURE — 93244 EXT ECG>48HR<7D REV&INTERPJ: CPT | Mod: ,,, | Performed by: INTERNAL MEDICINE

## 2025-02-10 LAB
OHS CV EVENT MONITOR DAY: 0
OHS CV HOLTER LENGTH DECIMAL HOURS: 72
OHS CV HOLTER LENGTH HOURS: 72
OHS CV HOLTER LENGTH MINUTES: 0
OHS CV HOLTER SINUS AVERAGE HR: 65
OHS CV HOLTER SINUS MAX HR: 117
OHS CV HOLTER SINUS MIN HR: 35

## 2025-02-11 ENCOUNTER — LAB VISIT (OUTPATIENT)
Dept: LAB | Facility: HOSPITAL | Age: 70
End: 2025-02-11
Attending: INTERNAL MEDICINE
Payer: MEDICARE

## 2025-02-11 ENCOUNTER — OFFICE VISIT (OUTPATIENT)
Dept: CARDIOLOGY | Facility: CLINIC | Age: 70
End: 2025-02-11
Payer: MEDICARE

## 2025-02-11 VITALS — BODY MASS INDEX: 33.57 KG/M2 | HEIGHT: 73 IN | WEIGHT: 253.31 LBS

## 2025-02-11 DIAGNOSIS — I48.0 PAF (PAROXYSMAL ATRIAL FIBRILLATION): ICD-10-CM

## 2025-02-11 DIAGNOSIS — E66.811 OBESITY, CLASS I, BMI 30-34.9: Chronic | ICD-10-CM

## 2025-02-11 DIAGNOSIS — I50.32 CHRONIC DIASTOLIC HEART FAILURE: Chronic | ICD-10-CM

## 2025-02-11 DIAGNOSIS — I10 ESSENTIAL HYPERTENSION: Chronic | ICD-10-CM

## 2025-02-11 DIAGNOSIS — E78.2 MIXED HYPERLIPIDEMIA: Chronic | ICD-10-CM

## 2025-02-11 DIAGNOSIS — I34.0 NONRHEUMATIC MITRAL VALVE REGURGITATION: Primary | ICD-10-CM

## 2025-02-11 DIAGNOSIS — R60.9 EDEMA, UNSPECIFIED TYPE: ICD-10-CM

## 2025-02-11 LAB
ALBUMIN SERPL BCP-MCNC: 3.6 G/DL (ref 3.5–5.2)
ALP SERPL-CCNC: 100 U/L (ref 40–150)
ALT SERPL W/O P-5'-P-CCNC: 18 U/L (ref 10–44)
ANION GAP SERPL CALC-SCNC: 10 MMOL/L (ref 8–16)
AST SERPL-CCNC: 21 U/L (ref 10–40)
BILIRUB SERPL-MCNC: 0.4 MG/DL (ref 0.1–1)
BUN SERPL-MCNC: 30 MG/DL (ref 8–23)
CALCIUM SERPL-MCNC: 9.2 MG/DL (ref 8.7–10.5)
CHLORIDE SERPL-SCNC: 109 MMOL/L (ref 95–110)
CO2 SERPL-SCNC: 24 MMOL/L (ref 23–29)
CREAT SERPL-MCNC: 1.4 MG/DL (ref 0.5–1.4)
EST. GFR  (NO RACE VARIABLE): 53.7 ML/MIN/1.73 M^2
GLUCOSE SERPL-MCNC: 105 MG/DL (ref 70–110)
MAGNESIUM SERPL-MCNC: 1.7 MG/DL (ref 1.6–2.6)
POTASSIUM SERPL-SCNC: 4.9 MMOL/L (ref 3.5–5.1)
PROT SERPL-MCNC: 6.9 G/DL (ref 6–8.4)
SODIUM SERPL-SCNC: 143 MMOL/L (ref 136–145)

## 2025-02-11 PROCEDURE — 80053 COMPREHEN METABOLIC PANEL: CPT | Performed by: INTERNAL MEDICINE

## 2025-02-11 PROCEDURE — 99214 OFFICE O/P EST MOD 30 MIN: CPT | Mod: S$PBB,,, | Performed by: INTERNAL MEDICINE

## 2025-02-11 PROCEDURE — 99999 PR PBB SHADOW E&M-EST. PATIENT-LVL II: CPT | Mod: PBBFAC,,, | Performed by: INTERNAL MEDICINE

## 2025-02-11 PROCEDURE — 99212 OFFICE O/P EST SF 10 MIN: CPT | Mod: PBBFAC,PO | Performed by: INTERNAL MEDICINE

## 2025-02-11 PROCEDURE — 36415 COLL VENOUS BLD VENIPUNCTURE: CPT | Mod: PO | Performed by: INTERNAL MEDICINE

## 2025-02-11 PROCEDURE — 83735 ASSAY OF MAGNESIUM: CPT | Performed by: INTERNAL MEDICINE

## 2025-02-11 RX ORDER — SPIRONOLACTONE 25 MG/1
12.5 TABLET ORAL EVERY OTHER DAY
Qty: 23 TABLET | Refills: 1 | Status: SHIPPED | OUTPATIENT
Start: 2025-02-11

## 2025-02-11 RX ORDER — FUROSEMIDE 20 MG/1
20 TABLET ORAL DAILY
Qty: 90 TABLET | Refills: 1 | Status: SHIPPED | OUTPATIENT
Start: 2025-02-11 | End: 2026-02-11

## 2025-02-11 NOTE — PROGRESS NOTES
Subjective:    Patient ID:  Nilesh Sanchez is a 69 y.o. male who presents for Follow-up, Heart Problem, Atrial Fibrillation, and Shortness of Breath        HPI  DISCUSSED TESTS AT LENGTH MULTIPLE QUESTIONS, BACK IN SINUS RHYTHM, AND SYMPTOMS HAVE IMPROVED SIGNIFICANTLY WITH MEDICATION WAS ADJUSTED MULTIPLE TIMES,, NORMAL NUCLEAR STRESS TEST ECHO NORMAL LV FUNCTION, MILDLY DILATED LEFT ATRIUM MILD MR PA PRESSURE 30 MM OF MERCURY, HOLTER PVCS PACS FEW EPISODES OF TWO-TO-ONE AV BLOCK, NO SIGNIFICANT PAUSES, DOING BETTER, WEIGHT WAS FLUCTUATING, SEE ROS    Left Ventricle: The left ventricle is normal in size. Mildly increased wall thickness. There is normal systolic function with a visually estimated ejection fraction of 60 - 65%.    Right Ventricle: Normal right ventricular cavity size. Wall thickness is normal. Systolic function is normal.    Left Atrium: Left atrium is mildly dilated.    Aortic Valve: There is mild aortic valve sclerosis.    Mitral Valve: There is mild regurgitation.    Aorta: Aortic root is mildly dilated.    Pulmonary Artery: There is borderline elevated pulmonary hypertension. The estimated pulmonary artery systolic pressure is 30 mmHg.    IVC/SVC: Normal venous pressure at 3 mmHg.  Past Medical History:   Diagnosis Date    A-fib     Allergic rhinosinusitis     Anxiety     Back pain     with spasms    Diverticulitis     s/p partial colectomy    GERD (gastroesophageal reflux disease)     Hx of colonic polyps      Past Surgical History:   Procedure Laterality Date    ARTHROSCOPIC CHONDROPLASTY OF KNEE JOINT Left 08/22/2018    Procedure: ARTHROSCOPY, KNEE, WITH CHONDROPLASTY;  Surgeon: Buster Bansal MD;  Location: [facility] OR;  Service: Orthopedics;  Laterality: Left;    COLECTOMY      18in of sigmoid colon removed, about 2014, [facility]    COLONOSCOPY      COLONOSCOPY N/A 05/13/2022    Procedure: COLONOSCOPY;  Surgeon: Polo Diop MD;  Location: [facility] ENDO;  Service: Endoscopy;  Laterality:  N/A;    CORONARY ANGIOGRAPHY N/A 3/6/2023    Procedure: ANGIOGRAM, CORONARY ARTERY;  Surgeon: Uche Walton Jr., MD;  Location: [facility] CATH LAB;  Service: Cardiology;  Laterality: N/A;  low bleeding risk 0.9%    ESOPHAGOGASTRODUODENOSCOPY      KNEE ARTHROSCOPY W/ MENISCECTOMY Left 08/22/2018    Procedure: ARTHROSCOPY, KNEE, WITH MENISCECTOMY PARTIAL MEDIAL;  Surgeon: Buster Bansal MD;  Location: [facility] OR;  Service: Orthopedics;  Laterality: Left;  femoral     LUMBAR DISC SURGERY  12/07/2017    L4/5    SPINE SURGERY      UPPER GASTROINTESTINAL ENDOSCOPY       Family History   Problem Relation Name Age of Onset    Heart disease Mother mom     Arthritis Mother mom     Early death Mother mom     Hypertension Mother mom     Heart disease Father      Heart disease Sister      Atrial fibrillation Sister      Heart disease Brother       Social History     Socioeconomic History    Marital status:    Tobacco Use    Smoking status: Never    Smokeless tobacco: Never   Substance and Sexual Activity    Alcohol use: Yes     Alcohol/week: 2.0 standard drinks of alcohol     Types: 2 Glasses of wine per week     Comment: wine daily    Drug use: Never    Sexual activity: Yes     Partners: Female     Birth control/protection: None   Social History Narrative    Owns company making leather belts.      Social Drivers of Health     Financial Resource Strain: Low Risk  (1/13/2025)    Overall Financial Resource Strain (CARDIA)     Difficulty of Paying Living Expenses: Not hard at all   Food Insecurity: No Food Insecurity (1/13/2025)    Hunger Vital Sign     Worried About Running Out of Food in the Last Year: Never true     Ran Out of Food in the Last Year: Never true   Transportation Needs: No Transportation Needs (1/13/2025)    TRANSPORTATION NEEDS     Transportation : No   Physical Activity: Sufficiently Active (5/1/2024)    Exercise Vital Sign     Days of Exercise per Week: 2 days     Minutes of Exercise per Session: 120  min   Stress: No Stress Concern Present (1/13/2025)    [employer] [place] [employer] - Occupational Stress Questionnaire     Feeling of Stress : Only a little   Housing Stability: Unknown (1/13/2025)    Housing Stability Vital Sign     Unable to Pay for Housing in the Last Year: No     Homeless in the Last Year: No       Review of patient's allergies indicates:  No Known Allergies    Current Outpatient Medications:     amLODIPine (NORVASC) 5 MG tablet, Take 1 tablet (5 mg total) by mouth 2 (two) times daily. Hold for SBP < 120, Disp: , Rfl:     atorvastatin (LIPITOR) 80 MG tablet, TAKE 1 TABLET EVERY EVENING, Disp: 90 tablet, Rfl: 3    ezetimibe (ZETIA) 10 mg tablet, Take 1 tablet (10 mg total) by mouth once daily., Disp: 90 tablet, Rfl: 1    lisinopriL (PRINIVIL,ZESTRIL) 20 MG tablet, TAKE 1 TABLET TWICE A DAY, Disp: 180 tablet, Rfl: 3    metoprolol succinate (TOPROL-XL) 50 MG 24 hr tablet, TAKE 1 TABLET EVERY EVENING, Disp: 90 tablet, Rfl: 3    sertraline (ZOLOFT) 100 MG tablet, Take 1 tablet (100 mg total) by mouth once daily., Disp: 90 tablet, Rfl: 3    apixaban (ELIQUIS) 5 mg Tab, Take 1 tablet (5 mg total) by mouth 2 (two) times daily., Disp: 180 tablet, Rfl: 1    aspirin (ECOTRIN) 81 MG EC tablet, Take 1 tablet (81 mg total) by mouth once daily., Disp: , Rfl: 0    furosemide (LASIX) 20 MG tablet, Take 1 tablet (20 mg total) by mouth once daily., Disp: 90 tablet, Rfl: 1    nitroGLYCERIN (NITROSTAT) 0.3 MG SL tablet, Place 1 tablet (0.3 mg total) under the tongue every 5 (five) minutes as needed for Chest pain., Disp: 100 tablet, Rfl: 3    spironolactone (ALDACTONE) 25 MG tablet, Take 0.5 tablets (12.5 mg total) by mouth every other day., Disp: 23 tablet, Rfl: 1    Review of Systems   Constitutional: Negative for chills, decreased appetite, diaphoresis, fever, malaise/fatigue and weight gain.   HENT:  Negative for congestion and nosebleeds.    Eyes:  Negative for blurred vision and visual  "disturbance.   Cardiovascular:  Negative for chest pain, dyspnea on exertion, irregular heartbeat, leg swelling (LESS), near-syncope, orthopnea, palpitations, paroxysmal nocturnal dyspnea and syncope.   Respiratory:  Positive for cough. Negative for hemoptysis, shortness of breath and wheezing.    Endocrine: Negative for polyphagia and polyuria.   Skin:  Negative for color change and skin cancer.   Musculoskeletal:  Positive for back pain. Negative for falls.   Gastrointestinal:  Negative for abdominal pain, dysphagia, jaundice, melena and nausea.   Genitourinary:  Negative for dysuria, flank pain and hematuria.   Neurological:  Negative for brief paralysis, light-headedness and weakness.   Psychiatric/Behavioral:  Negative for altered mental status and depression.         Objective:      Vitals:    02/11/25 1332   Weight: 114.9 kg (253 lb 4.9 oz)   Height: 6' 1" (1.854 m)     Body mass index is 33.42 kg/m².    Physical Exam  Constitutional:       Appearance: He is obese.   HENT:      Head: Normocephalic and atraumatic.   Eyes:      Extraocular Movements: Extraocular movements intact.      Conjunctiva/sclera: Conjunctivae normal.      Pupils: Pupils are equal, round, and reactive to light.   Neck:      Vascular: No carotid bruit.   Cardiovascular:      Rate and Rhythm: Normal rate and regular rhythm. No extrasystoles are present.     Pulses:           Carotid pulses are 2+ on the right side and 2+ on the left side.       Radial pulses are 2+ on the right side and 2+ on the left side.        Posterior tibial pulses are 2+ on the right side and 2+ on the left side.      Heart sounds: Murmur heard.      Systolic murmur is present with a grade of 1/6 at the lower left sternal border.      No friction rub. No gallop.   Pulmonary:      Effort: Pulmonary effort is normal.      Breath sounds: Normal breath sounds and air entry. No rales.   Abdominal:      Palpations: Abdomen is soft.      Tenderness: There is no abdominal " tenderness.   Musculoskeletal:      Cervical back: Neck supple.      Right lower leg: No edema.      Left lower leg: No edema.   Skin:     Capillary Refill: Capillary refill takes less than 2 seconds.   Neurological:      General: No focal deficit present.      Mental Status: He is alert and oriented to person, place, and time.   Psychiatric:         Mood and Affect: Mood normal.         Behavior: Behavior normal.                 ..    Chemistry        Component Value Date/Time     01/15/2025 0419    K 3.8 01/15/2025 0419     01/15/2025 0419    CO2 28 01/15/2025 0419    BUN 41 (H) 01/15/2025 0419    CREATININE 1.29 01/15/2025 0419     01/15/2025 0419        Component Value Date/Time    CALCIUM 8.9 01/15/2025 0419    ALKPHOS 95 01/15/2025 0419    AST 48 (H) 01/15/2025 0419    ALT 45 (H) 01/15/2025 0419    BILITOT 0.6 01/15/2025 0419    ESTGFRAFRICA >60.0 12/09/2021 0725    EGFRNONAA >60.0 12/09/2021 0725            ..  Lab Results   Component Value Date    CHOL 147 06/21/2024    CHOL 211 (H) 05/02/2024    CHOL 205 (H) 06/14/2023     Lab Results   Component Value Date    HDL 47 06/21/2024    HDL 51 05/02/2024    HDL 51 06/14/2023     Lab Results   Component Value Date    LDLCALC 65.8 06/21/2024    LDLCALC 115.0 05/02/2024    LDLCALC 99.6 06/14/2023     Lab Results   Component Value Date    TRIG 171 (H) 06/21/2024    TRIG 225 (H) 05/02/2024    TRIG 272 (H) 06/14/2023     Lab Results   Component Value Date    CHOLHDL 32.0 06/21/2024    CHOLHDL 24.2 05/02/2024    CHOLHDL 24.9 06/14/2023     ..  Lab Results   Component Value Date    WBC 7.23 01/15/2025    HGB 12.6 (L) 01/15/2025    HCT 37.9 (L) 01/15/2025    MCV 92 01/15/2025     01/15/2025       Test(s) Reviewed  I have reviewed the following in detail:  [x] Stress test   [] Angiography   [x] Echocardiogram   [] Labs   [x] Other:       Assessment:         ICD-10-CM ICD-9-CM   1. Nonrheumatic mitral valve regurgitation  I34.0 424.0   2. PAF  (paroxysmal atrial fibrillation)  I48.0 427.31   3. Chronic diastolic heart failure  I50.32 428.32   4. Mixed hyperlipidemia  E78.2 272.2   5. Essential hypertension  I10 401.9   6. Obesity, Class I, BMI 30-34.9  E66.811 278.00   7. Edema, unspecified type  R60.9 782.3     Problem List Items Addressed This Visit          Cardiac/Vascular    Essential hypertension (Chronic)    Relevant Orders    Comprehensive Metabolic Panel    Magnesium    Mixed hyperlipidemia (Chronic)    Relevant Orders    Comprehensive Metabolic Panel    Chronic diastolic heart failure (Chronic)    Relevant Medications    furosemide (LASIX) 20 MG tablet    Other Relevant Orders    Comprehensive Metabolic Panel    Magnesium    PAF (paroxysmal atrial fibrillation)    Nonrheumatic mitral valve regurgitation - Primary       Endocrine    Obesity, Class I, BMI 30-34.9     Other Visit Diagnoses       Edema, unspecified type        Relevant Medications    furosemide (LASIX) 20 MG tablet             Plan:       NO HCTZ, LASIX 20 DAILY, SPIRONOLACTONE 1/2 QOD, CONSIDER DAILY WEIGHT, CHECK, LABS  ALL CV CLINICALLY STABLE, NO ANGINA, CLASS 2  HF, NO TIA, STABILIZED CLINICAL ARRHYTHMIA,CONTINUE CURRENT MEDS, EDUCATION, DIET, EXERCISE , WEIGHT LOSS RETURN TO CLINIC IN 4-5 MONTHS, DISCUSSED PLAN WITH THE PATIENT HIS WIFE        Nonrheumatic mitral valve regurgitation    PAF (paroxysmal atrial fibrillation)    Chronic diastolic heart failure  -     furosemide (LASIX) 20 MG tablet; Take 1 tablet (20 mg total) by mouth once daily.  Dispense: 90 tablet; Refill: 1  -     Comprehensive Metabolic Panel; Future; Expected date: 02/11/2025  -     Magnesium; Future; Expected date: 02/11/2025    Mixed hyperlipidemia  -     Comprehensive Metabolic Panel; Future; Expected date: 02/11/2025    Essential hypertension  -     Comprehensive Metabolic Panel; Future; Expected date: 02/11/2025  -     Magnesium; Future; Expected date: 02/11/2025    Obesity, Class I, BMI  30-34.9    Edema, unspecified type  -     furosemide (LASIX) 20 MG tablet; Take 1 tablet (20 mg total) by mouth once daily.  Dispense: 90 tablet; Refill: 1    Other orders  -     spironolactone (ALDACTONE) 25 MG tablet; Take 0.5 tablets (12.5 mg total) by mouth every other day.  Dispense: 23 tablet; Refill: 1  -     apixaban (ELIQUIS) 5 mg Tab; Take 1 tablet (5 mg total) by mouth 2 (two) times daily.  Dispense: 180 tablet; Refill: 1    RTC Low level/low impact aerobic exercise 5x's/wk. Heart healthy diet and risk factor modification.    See labs and med orders.    Aerobic exercise 5x's/wk. Heart healthy diet and risk factor modification.    See labs and med orders.

## 2025-02-14 DIAGNOSIS — E78.2 MIXED HYPERLIPIDEMIA: Chronic | ICD-10-CM

## 2025-02-14 RX ORDER — EZETIMIBE 10 MG/1
TABLET ORAL
Refills: 0 | OUTPATIENT
Start: 2025-02-14

## 2025-02-14 NOTE — TELEPHONE ENCOUNTER
Refill Decision Note   Nilesh Sanchez  is requesting a refill authorization.  Brief Assessment and Rationale for Refill:  Quick Discontinue     Medication Therapy Plan: Pharmacy is requesting new scripts for the following medications without required information, (sig/ frequency/qty/etc)       Medication Reconciliation Completed: No   Comments: Pharmacies have been requesting medications for patients without required information, (sig, frequency, qty, etc.). In addition, requests are sent for medication(s) pt. are currently not taking, and medications patients have never taken.    We have spoken to the pharmacies about these request types and advised their teams previously that we are unable to assess these New Script requests and require all details for these requests. This is a known issue and has been reported.     No Care Gaps recommended.     Note composed:2:42 PM 02/14/2025

## 2025-02-14 NOTE — TELEPHONE ENCOUNTER
No care due was identified.  [facility] Embedded Care Due Messages. Reference number: [id number].   2/14/2025 1:47:35 PM CST

## 2025-03-13 ENCOUNTER — PATIENT MESSAGE (OUTPATIENT)
Dept: FAMILY MEDICINE | Facility: CLINIC | Age: 70
End: 2025-03-13
Payer: MEDICARE

## 2025-03-14 ENCOUNTER — OFFICE VISIT (OUTPATIENT)
Dept: FAMILY MEDICINE | Facility: CLINIC | Age: 70
End: 2025-03-14
Payer: MEDICARE

## 2025-03-14 VITALS — WEIGHT: 253.31 LBS | BODY MASS INDEX: 33.57 KG/M2 | HEIGHT: 73 IN

## 2025-03-14 DIAGNOSIS — L60.8 SPLINTER HEMORRHAGE OF NAIL: ICD-10-CM

## 2025-03-14 DIAGNOSIS — R26.89 BALANCE PROBLEM: Primary | ICD-10-CM

## 2025-03-14 NOTE — PROGRESS NOTES
Assessment and Plan:    1. Balance problem (Primary)  Discussed likely multifactorial nature of this balance issue.  We discussed changes with proprioception associated with aging.  Decreased lower extremity strength also likely contributing.  Reports that he is not having any trouble with his vision or hearing.  He reports that he has had lightheadedness when he had the episode of AFib with the does not currently feel lightheaded.  He has had episodes of vertigo in the past but he is not currently feel like he is having any vertigo.  We discussed options including neurological evaluation, going back to physical therapy, mobility aid such as a walker.  He reports that he is not currently interested in any of this.  He is going to continue working with his  on lower extremity strengthening and I have encouraged him to restart the proprioceptive training and neuromuscular re-education that he had been doing with PT.    2. Splinter hemorrhage of nail  Discussed that since there is only one that it does not affect the nailbed this is most likely due to trauma.  Discussed that this will likely grow out over the course the next few months.  No treatment is needed.    ______________________________________________________________________  Subjective:    Chief Complaint:  Black line in nail, discuss balance    HPI:  Nilesh is a 69 y.o. year old male patient with telemedicine visit today as consultation regarding a black line that appeared in his nail and to discuss the issues he has been having with balance.    The patient location is: home in [place]  The chief complaint leading to consultation is: as above    Visit type: audiovisual    Face to Face time with patient: 20 minutes  25 minutes of total time spent on the encounter, which includes face to face time and non-face to face time preparing to see the patient (eg, review of tests), Obtaining and/or reviewing separately obtained history, Documenting clinical  information in the electronic or other health record, Independently interpreting results (not separately reported) and communicating results to the patient/family/caregiver, or Care coordination (not separately reported).         Each patient to whom he or she provides medical services by telemedicine is:  (1) informed of the relationship between the physician and patient and the respective role of any other health care provider with respect to management of the patient; and (2) notified that he or she may decline to receive medical services by telemedicine and may withdraw from such care at any time.    Notes:     He has a black line that appeared about 2 weeks ago on his left pinky finger.  He reports that he does not remember any specific trauma but he has been working outside in his yard a lot.  He reports that he has never had anything like this before.  He tried to file it often notes that it did not come off.  He specifically reports that it does not affect his nail bed.    He reports that he has been consistently having trouble with his balance for the last few months. He reports that he has had vertigo in the past but this does not feel like vertigo.  He had episodes of feeling lightheaded around when he had AFib in February but has not been feeling lightheaded or like he is going to pass out.  He reports that this feels like a lack of strength in his lower extremities and just feeling like his legs do not work as well as they used to.  He had done a course of physical therapy for balance training in September but ended this after five sessions due to work.  He is currently doing some of those same home exercises and working with a  on leg strength.  He reports that he has up-to-date glasses and has not been having any trouble with his vision or hearing recently. This has not been specifically worse since the head injury in October, he actually thinks the balance issues caused the fall that  "led to the head injury.        Medications:  Medications Ordered Prior to Encounter[1]    Review of Systems:  Review of Systems   Constitutional:  Negative for activity change and unexpected weight change.   HENT:  Negative for hearing loss, rhinorrhea and trouble swallowing.    Eyes:  Negative for discharge and visual disturbance.   Respiratory:  Negative for chest tightness and wheezing.    Cardiovascular:  Negative for chest pain and palpitations.   Gastrointestinal:  Negative for blood in stool, constipation, diarrhea and vomiting.   Endocrine: Negative for polydipsia and polyuria.   Genitourinary:  Negative for difficulty urinating, hematuria and urgency.   Musculoskeletal:  Negative for arthralgias, joint swelling and neck pain.   Neurological:  Negative for weakness and headaches.   Psychiatric/Behavioral:  Negative for confusion and dysphoric mood.      Entered by patient and reviewed and updated during visit      Past Medical History:  Past Medical History:   Diagnosis Date    A-fib     Allergic rhinosinusitis     Anxiety     Back pain     with spasms    Diverticulitis     s/p partial colectomy    GERD (gastroesophageal reflux disease)     Hx of colonic polyps        Objective:    Vitals:  Vitals:    03/14/25 0904   Weight: 114.9 kg (253 lb 4.9 oz)   Height: 6' 1" (1.854 m)       Physical Exam  Constitutional:       General: He is not in acute distress.     Appearance: He is well-developed. He is not diaphoretic.   HENT:      Head: Normocephalic and atraumatic.   Pulmonary:      Effort: Pulmonary effort is normal. No respiratory distress.   Neurological:      Mental Status: He is alert and oriented to person, place, and time.   Psychiatric:         Behavior: Behavior normal.         Thought Content: Thought content normal.         Judgment: Judgment normal.           Flavia Lange MD  Internal Medicine         [1]   Current Outpatient Medications on File Prior to Visit   Medication Sig Dispense Refill    " amLODIPine (NORVASC) 5 MG tablet Take 1 tablet (5 mg total) by mouth 2 (two) times daily. Hold for SBP < 120      apixaban (ELIQUIS) 5 mg Tab Take 1 tablet (5 mg total) by mouth 2 (two) times daily. 180 tablet 1    aspirin (ECOTRIN) 81 MG EC tablet Take 1 tablet (81 mg total) by mouth once daily.  0    atorvastatin (LIPITOR) 80 MG tablet TAKE 1 TABLET EVERY EVENING 90 tablet 3    ezetimibe (ZETIA) 10 mg tablet Take 1 tablet (10 mg total) by mouth once daily. 90 tablet 1    furosemide (LASIX) 20 MG tablet Take 1 tablet (20 mg total) by mouth once daily. 90 tablet 1    lisinopriL (PRINIVIL,ZESTRIL) 20 MG tablet TAKE 1 TABLET TWICE A  tablet 3    metoprolol succinate (TOPROL-XL) 50 MG 24 hr tablet TAKE 1 TABLET EVERY EVENING 90 tablet 3    nitroGLYCERIN (NITROSTAT) 0.3 MG SL tablet Place 1 tablet (0.3 mg total) under the tongue every 5 (five) minutes as needed for Chest pain. 100 tablet 3    sertraline (ZOLOFT) 100 MG tablet Take 1 tablet (100 mg total) by mouth once daily. 90 tablet 3    spironolactone (ALDACTONE) 25 MG tablet Take 0.5 tablets (12.5 mg total) by mouth every other day. 23 tablet 1     No current facility-administered medications on file prior to visit.

## 2025-05-13 DIAGNOSIS — I10 ESSENTIAL HYPERTENSION: ICD-10-CM

## 2025-05-13 DIAGNOSIS — I25.10 CORONARY ARTERY DISEASE INVOLVING NATIVE CORONARY ARTERY OF NATIVE HEART WITHOUT ANGINA PECTORIS: ICD-10-CM

## 2025-05-14 ENCOUNTER — PATIENT MESSAGE (OUTPATIENT)
Dept: FAMILY MEDICINE | Facility: CLINIC | Age: 70
End: 2025-05-14
Payer: MEDICARE

## 2025-05-14 DIAGNOSIS — K21.9 GASTROESOPHAGEAL REFLUX DISEASE, UNSPECIFIED WHETHER ESOPHAGITIS PRESENT: Primary | ICD-10-CM

## 2025-05-14 RX ORDER — PANTOPRAZOLE SODIUM 40 MG/1
40 TABLET, DELAYED RELEASE ORAL DAILY
Qty: 90 TABLET | Refills: 0 | Status: SHIPPED | OUTPATIENT
Start: 2025-05-14 | End: 2026-05-14

## 2025-05-14 RX ORDER — AMLODIPINE BESYLATE 5 MG/1
5 TABLET ORAL 2 TIMES DAILY
Qty: 180 TABLET | Refills: 2 | Status: SHIPPED | OUTPATIENT
Start: 2025-05-14

## 2025-05-14 NOTE — TELEPHONE ENCOUNTER
No care due was identified.  [facility] Embedded Care Due Messages. Reference number: [id number].   5/13/2025 11:30:47 PM CDT

## 2025-05-14 NOTE — TELEPHONE ENCOUNTER
Refill Decision Note   Nilesh Sanchez  is requesting a refill authorization.  Brief Assessment and Rationale for Refill:  Approve     Medication Therapy Plan:         Comments:     Note composed:12:12 AM 05/14/2025

## 2025-05-14 NOTE — TELEPHONE ENCOUNTER
I can send a 90 day supply, but please schedule an appointment as he is due for his routine follow up this month anyway. Last seen in May 2024 for routine care. If he would like to do labs in advance of this, let me know.

## 2025-05-14 NOTE — TELEPHONE ENCOUNTER
Pt requesting refill on pantoprazole 40mg. Appears it was taken off medlist. If OK, please refill.

## 2025-05-19 ENCOUNTER — PATIENT MESSAGE (OUTPATIENT)
Dept: CARDIOLOGY | Facility: CLINIC | Age: 70
End: 2025-05-19
Payer: MEDICARE

## 2025-06-02 ENCOUNTER — PATIENT MESSAGE (OUTPATIENT)
Dept: CARDIOLOGY | Facility: CLINIC | Age: 70
End: 2025-06-02
Payer: MEDICARE

## 2025-06-17 ENCOUNTER — OFFICE VISIT (OUTPATIENT)
Dept: CARDIOLOGY | Facility: CLINIC | Age: 70
End: 2025-06-17
Payer: MEDICARE

## 2025-06-17 VITALS
HEIGHT: 73 IN | WEIGHT: 260.13 LBS | BODY MASS INDEX: 34.47 KG/M2 | DIASTOLIC BLOOD PRESSURE: 74 MMHG | SYSTOLIC BLOOD PRESSURE: 134 MMHG | HEART RATE: 94 BPM

## 2025-06-17 DIAGNOSIS — E66.811 OBESITY, CLASS I, BMI 30-34.9: Chronic | ICD-10-CM

## 2025-06-17 DIAGNOSIS — E78.2 MIXED HYPERLIPIDEMIA: Chronic | ICD-10-CM

## 2025-06-17 DIAGNOSIS — I48.0 PAF (PAROXYSMAL ATRIAL FIBRILLATION): Chronic | ICD-10-CM

## 2025-06-17 DIAGNOSIS — N18.31 STAGE 3A CHRONIC KIDNEY DISEASE: ICD-10-CM

## 2025-06-17 DIAGNOSIS — I50.32 CHRONIC DIASTOLIC HEART FAILURE: Chronic | ICD-10-CM

## 2025-06-17 DIAGNOSIS — I10 ESSENTIAL HYPERTENSION: ICD-10-CM

## 2025-06-17 DIAGNOSIS — I34.0 NONRHEUMATIC MITRAL VALVE REGURGITATION: Chronic | ICD-10-CM

## 2025-06-17 DIAGNOSIS — Z79.01 LONG TERM (CURRENT) USE OF ANTICOAGULANTS: Chronic | ICD-10-CM

## 2025-06-17 DIAGNOSIS — I25.10 CORONARY ARTERY DISEASE INVOLVING NATIVE CORONARY ARTERY OF NATIVE HEART WITHOUT ANGINA PECTORIS: Primary | Chronic | ICD-10-CM

## 2025-06-17 PROCEDURE — 99214 OFFICE O/P EST MOD 30 MIN: CPT | Mod: S$PBB,,, | Performed by: INTERNAL MEDICINE

## 2025-06-17 PROCEDURE — 99999 PR PBB SHADOW E&M-EST. PATIENT-LVL III: CPT | Mod: PBBFAC,,, | Performed by: INTERNAL MEDICINE

## 2025-06-17 PROCEDURE — 99213 OFFICE O/P EST LOW 20 MIN: CPT | Mod: PBBFAC,PO | Performed by: INTERNAL MEDICINE

## 2025-06-17 RX ORDER — AMLODIPINE BESYLATE 5 MG/1
5 TABLET ORAL 2 TIMES DAILY
Qty: 180 TABLET | Refills: 2 | Status: SHIPPED | OUTPATIENT
Start: 2025-06-17

## 2025-06-17 RX ORDER — ATORVASTATIN CALCIUM 80 MG/1
80 TABLET, FILM COATED ORAL NIGHTLY
Qty: 90 TABLET | Refills: 3 | Status: SHIPPED | OUTPATIENT
Start: 2025-06-17

## 2025-06-17 RX ORDER — METOPROLOL SUCCINATE 50 MG/1
50 TABLET, EXTENDED RELEASE ORAL NIGHTLY
Qty: 90 TABLET | Refills: 1 | Status: SHIPPED | OUTPATIENT
Start: 2025-06-17

## 2025-06-17 RX ORDER — EZETIMIBE 10 MG/1
10 TABLET ORAL DAILY
Qty: 90 TABLET | Refills: 1 | Status: SHIPPED | OUTPATIENT
Start: 2025-06-17 | End: 2026-06-17

## 2025-06-17 RX ORDER — SPIRONOLACTONE 25 MG/1
12.5 TABLET ORAL EVERY OTHER DAY
Qty: 23 TABLET | Refills: 1 | Status: SHIPPED | OUTPATIENT
Start: 2025-06-17

## 2025-06-17 RX ORDER — LISINOPRIL 20 MG/1
20 TABLET ORAL 2 TIMES DAILY
Qty: 180 TABLET | Refills: 1 | Status: SHIPPED | OUTPATIENT
Start: 2025-06-17

## 2025-06-17 NOTE — PROGRESS NOTES
Mr. Anibal Martin is a 62 y.o. y/o male with history of  CVA dt thrombus  who presents today for anticoagulation monitoring and adjustment. Pertinent PMH: Has been on warfarin for about 1 year dt CVA dt thrombus of precebreal artery (Oct 2020). Was monitored at [facility] but dc dt non-compliance.       Patient Reported Findings:   Yes     No  [x]   []       Patient verifies current dosing regimen as listed- pt has 5mg and 2.5mg tablets and takes 7.5mg (one of each) on Thursdays only and 5mg all other days --> verified dose---> confirmed---> Thinks did 5mg Sun, Tues, Thurs and Sat (instead of 7.5mg on Sat)  []   [x]       S/S bleeding/bruising/swelling/SOB- denies  []   [x]       Blood in urine or stool- denies   []   [x]       Procedures scheduled in the future at this time- denies     []   [x]       Missed Dose -  unsure   []   [x]       Extra Dose - denies   []   [x]       Change in medications- takes pred 10mg daily, --> had injection of steroid for sarcoidosis, will be having injections twice a week of steroids will be on for likely 1 year --> switched from injection to azathioprine --> no changes, same pred dose---> inc azathioprine dose about 2 weeks ago, started folate---> no changes     [x]   []       Change in health/diet/appetite- pt cut them out of diet but occasionally eats broccoli --> had greens 3x/week and green tea --> no vit k in the past week, one green tea --> had greens 2-3 times in past week --> no greens ---> less greens, no NVD --> no changes  --> has had diarrhea recently --> had can of greens and green tea more than usual. Diarrhea improved ---> no changes, no NVD---> more greens (four times in past week) but now out of them, had diarrhea on and off   []   [x]       Change in alcohol use- drinks wine occasionally, doesn't smoke   []   [x]       Change in activity  []   [x]       Hospital admission  []   [x]       Emergency department visit  []   [x]       Other complaints    Clinical Subjective:    Patient ID:  Nilesh Sanchez is a 70 y.o. male who presents for Follow-up, Nonrheumatic mitral valve regurgitation, Heart Problem, and Atrial Fibrillation        HPI    DISCUSSED LABS AND GOALS CMP WITH GFR OF 54, MAGNESIUM OK AT 1.7, FEELS MUCH BETTER, WALKING , NO PALP, NO TIA TYPE SYMPTOMS NO NEAR-SYNCOPE NO PALPITATION, SEE ROS  Past Medical History:   Diagnosis Date    A-fib     Allergic rhinosinusitis     Anxiety     Back pain     with spasms    Diverticulitis     s/p partial colectomy    GERD (gastroesophageal reflux disease)     Hx of colonic polyps      Past Surgical History:   Procedure Laterality Date    ARTHROSCOPIC CHONDROPLASTY OF KNEE JOINT Left 08/22/2018    Procedure: ARTHROSCOPY, KNEE, WITH CHONDROPLASTY;  Surgeon: Buster Bansal MD;  Location: [facility];  Service: Orthopedics;  Laterality: Left;    COLECTOMY      18in of sigmoid colon removed, about 2014, [facility]    COLONOSCOPY      COLONOSCOPY N/A 05/13/2022    Procedure: COLONOSCOPY;  Surgeon: Polo Diop MD;  Location: [facility] ENDO;  Service: Endoscopy;  Laterality: N/A;    CORONARY ANGIOGRAPHY N/A 3/6/2023    Procedure: ANGIOGRAM, CORONARY ARTERY;  Surgeon: Uche Walton Jr., MD;  Location: [facility] CATH LAB;  Service: Cardiology;  Laterality: N/A;  low bleeding risk 0.9%    ESOPHAGOGASTRODUODENOSCOPY      KNEE ARTHROSCOPY W/ MENISCECTOMY Left 08/22/2018    Procedure: ARTHROSCOPY, KNEE, WITH MENISCECTOMY PARTIAL MEDIAL;  Surgeon: Buster Bansal MD;  Location: [facility];  Service: Orthopedics;  Laterality: Left;  femoral     LUMBAR DISC SURGERY  12/07/2017    L4/5    SPINE SURGERY      UPPER GASTROINTESTINAL ENDOSCOPY       Family History   Problem Relation Name Age of Onset    Heart disease Mother mom     Arthritis Mother mom     Early death Mother mom     Hypertension Mother mom     Heart disease Father      Heart disease Sister      Atrial fibrillation Sister      Heart disease Brother       Social History      Socioeconomic History    Marital status:    Tobacco Use    Smoking status: Never    Smokeless tobacco: Never   Substance and Sexual Activity    Alcohol use: Yes     Alcohol/week: 2.0 standard drinks of alcohol     Types: 2 Glasses of wine per week     Comment: wine daily    Drug use: Never    Sexual activity: Yes     Partners: Female     Birth control/protection: None   Social History Narrative    Owns company making leather belts.      Social Drivers of Health     Financial Resource Strain: Low Risk  (1/13/2025)    Overall Financial Resource Strain (CARDIA)     Difficulty of Paying Living Expenses: Not hard at all   Food Insecurity: No Food Insecurity (1/13/2025)    Hunger Vital Sign     Worried About Running Out of Food in the Last Year: Never true     Ran Out of Food in the Last Year: Never true   Transportation Needs: No Transportation Needs (1/13/2025)    TRANSPORTATION NEEDS     Transportation : No   Physical Activity: Sufficiently Active (5/1/2024)    Exercise Vital Sign     Days of Exercise per Week: 2 days     Minutes of Exercise per Session: 120 min   Stress: No Stress Concern Present (1/13/2025)    [employer] [place] [employer] - Occupational Stress Questionnaire     Feeling of Stress : Only a little   Housing Stability: Unknown (1/13/2025)    Housing Stability Vital Sign     Unable to Pay for Housing in the Last Year: No     Homeless in the Last Year: No       Review of patient's allergies indicates:  No Known Allergies  Current Medications[1]    Review of Systems   Constitutional: Positive for weight gain. Negative for chills, decreased appetite, diaphoresis, fever and malaise/fatigue.   HENT:  Negative for congestion and nosebleeds.    Eyes:  Negative for blurred vision and visual disturbance.   Cardiovascular:  Negative for chest pain, claudication, cyanosis, dyspnea on exertion, irregular heartbeat, leg swelling, near-syncope, orthopnea, palpitations, paroxysmal nocturnal  Outcomes:  Yes     No  []   [x]       Major bleeding event  []   [x]       Thromboembolic event    Duration of warfarin Therapy: indefinite  INR Range:  2.0-3.0    Pt has 5mg and 2.5mg tablets, takes at night. INR is 1.4 after not increasing dose. Pt not sure if he missed but doesn't think he did this time. Increase dose to 5mg Sun and Thurs and 7.5mg all other days (5.6%). <--- really 11% inc if pt didn't do dose increase. Discussed importance of consistency with vit k intake and ways to help ensure pt takes doses since consistently missing doses. Encouraged to maintain a consistency of vegetables/salads.    Recheck INR in 1 week,   Pt frustrated with frequent apts, wishes to talk to referring about alternative options and does not wish to remain on warfarin     Pt would prefer 3:30/later apts dt work schedule     Consent form signed 2023    Referring Dr. Yenifer Walker  INR (no units)   Date Value   2021 3.6 (H)   2021 2.7 (H)   2021 1.7 (H)   2021 1.2     INR,(POC) (no units)   Date Value   2023 1.4   2023 1.3   2023 1.8   12/15/2022 2.2     For Pharmacy Admin Tracking Only    Intervention Detail: Adherence Monitorin and Dose Adjustment: 1, reason: Therapy Optimization  Total # of Interventions Recommended: 2  Total # of Interventions Accepted: 2  Time Spent (min): 15 "dyspnea and syncope.   Respiratory:  Negative for cough, hemoptysis, shortness of breath and wheezing.    Endocrine: Negative for polyphagia and polyuria.   Hematologic/Lymphatic: Negative for adenopathy. Does not bruise/bleed easily.   Skin:  Negative for color change and skin cancer.   Musculoskeletal:  Negative for back pain and falls.   Gastrointestinal:  Negative for abdominal pain, dysphagia, jaundice, melena and nausea.   Genitourinary:  Negative for dysuria, flank pain and hematuria.   Neurological:  Negative for brief paralysis, light-headedness and weakness.   Psychiatric/Behavioral:  Negative for altered mental status and depression.         Objective:      Vitals:    06/17/25 1044   BP: 134/74   Pulse: 94   Weight: 118 kg (260 lb 2.3 oz)   Height: 6' 1" (1.854 m)   PainSc: 0-No pain     Body mass index is 34.32 kg/m².    Physical Exam  Constitutional:       Appearance: He is obese.   HENT:      Head: Normocephalic and atraumatic.   Eyes:      Extraocular Movements: Extraocular movements intact.      Conjunctiva/sclera: Conjunctivae normal.      Pupils: Pupils are equal, round, and reactive to light.   Neck:      Vascular: No carotid bruit.   Cardiovascular:      Rate and Rhythm: Normal rate and regular rhythm. No extrasystoles are present.     Pulses:           Carotid pulses are 2+ on the right side and 2+ on the left side.       Radial pulses are 2+ on the right side and 2+ on the left side.        Posterior tibial pulses are 2+ on the right side and 2+ on the left side.      Heart sounds: Murmur heard.      Systolic murmur is present with a grade of 1/6 at the lower left sternal border and apex.      No friction rub. No gallop.   Pulmonary:      Effort: Pulmonary effort is normal.      Breath sounds: Normal breath sounds and air entry. No rales.   Abdominal:      Palpations: Abdomen is soft.      Tenderness: There is no abdominal tenderness.   Musculoskeletal:      Cervical back: Neck supple.      " Right lower leg: No edema.      Left lower leg: No edema.   Skin:     Capillary Refill: Capillary refill takes less than 2 seconds.   Neurological:      General: No focal deficit present.      Mental Status: He is alert and oriented to person, place, and time.   Psychiatric:         Mood and Affect: Mood normal.         Speech: Speech normal.         Behavior: Behavior normal.               ..    Chemistry        Component Value Date/Time     02/11/2025 1416    K 4.9 02/11/2025 1416     02/11/2025 1416    CO2 24 02/11/2025 1416    BUN 30 (H) 02/11/2025 1416    CREATININE 1.4 02/11/2025 1416     02/11/2025 1416        Component Value Date/Time    CALCIUM 9.2 02/11/2025 1416    ALKPHOS 100 02/11/2025 1416    AST 21 02/11/2025 1416    ALT 18 02/11/2025 1416    BILITOT 0.4 02/11/2025 1416    ESTGFRAFRICA >60.0 12/09/2021 0725    EGFRNONAA >60.0 12/09/2021 0725            ..  Lab Results   Component Value Date    CHOL 147 06/21/2024    CHOL 211 (H) 05/02/2024    CHOL 205 (H) 06/14/2023     Lab Results   Component Value Date    HDL 47 06/21/2024    HDL 51 05/02/2024    HDL 51 06/14/2023     Lab Results   Component Value Date    LDLCALC 65.8 06/21/2024    LDLCALC 115.0 05/02/2024    LDLCALC 99.6 06/14/2023     Lab Results   Component Value Date    TRIG 171 (H) 06/21/2024    TRIG 225 (H) 05/02/2024    TRIG 272 (H) 06/14/2023     Lab Results   Component Value Date    CHOLHDL 32.0 06/21/2024    CHOLHDL 24.2 05/02/2024    CHOLHDL 24.9 06/14/2023     ..  Lab Results   Component Value Date    WBC 7.23 01/15/2025    HGB 12.6 (L) 01/15/2025    HCT 37.9 (L) 01/15/2025    MCV 92 01/15/2025     01/15/2025       Test(s) Reviewed  I have reviewed the following in detail:  [] Stress test   [] Angiography   [] Echocardiogram   [x] Labs   [] Other:       Assessment:         ICD-10-CM ICD-9-CM   1. Coronary artery disease involving native coronary artery of native heart without angina pectoris  I25.10 414.01   2.  Chronic diastolic heart failure  I50.32 428.32   3. PAF (paroxysmal atrial fibrillation)  I48.0 427.31   4. Nonrheumatic mitral valve regurgitation  I34.0 424.0   5. Stage 3a chronic kidney disease  N18.31 585.3   6. Long term (current) use of anticoagulants  Z79.01 V58.61   7. Essential hypertension  I10 401.9   8. Obesity, Class I, BMI 30-34.9  E66.811 278.00   9. Mixed hyperlipidemia  E78.2 272.2     Problem List Items Addressed This Visit          Cardiac/Vascular    Essential hypertension (Chronic)    Relevant Medications    amLODIPine (NORVASC) 5 MG tablet    lisinopriL (PRINIVIL,ZESTRIL) 20 MG tablet    metoprolol succinate (TOPROL-XL) 50 MG 24 hr tablet    Other Relevant Orders    Comprehensive Metabolic Panel    Mixed hyperlipidemia (Chronic)    Relevant Medications    atorvastatin (LIPITOR) 80 MG tablet    ezetimibe (ZETIA) 10 mg tablet    Coronary artery disease involving native coronary artery of native heart without angina pectoris - Primary (Chronic)    Relevant Medications    amLODIPine (NORVASC) 5 MG tablet    Other Relevant Orders    Comprehensive Metabolic Panel    Hemoglobin    Lipid Panel    Magnesium    Chronic diastolic heart failure (Chronic)    Relevant Orders    Comprehensive Metabolic Panel    BNP    Magnesium    PAF (paroxysmal atrial fibrillation)    Nonrheumatic mitral valve regurgitation       Renal/    Stage 3a chronic kidney disease       Hematology    Long term (current) use of anticoagulants (Chronic)        Plan:       DAILY WEIGHT, 2 LB PER DAY 5 LB PER WEEK RULE EXPLAINED, THE PATIENT IS DOING MUCH BETTER CLINICALLY WITH CURRENT TREATMENT  ALL CV CLINICALLY STABLE, NO ANGINA, NO OVERT HF, NO TIA, NO CLINICAL ARRHYTHMIA,CONTINUE CURRENT MEDS, EDUCATION, DIET, EXERCISE CONTINUE WITH WEIGHT LOSS RETURN TO CLINIC IN 6 MONTHS WITH LABS         Coronary artery disease involving native coronary artery of native heart without angina pectoris  -     Comprehensive Metabolic Panel;  Future; Expected date: 06/17/2025  -     Hemoglobin; Future; Expected date: 12/17/2025  -     Lipid Panel; Future; Expected date: 06/17/2025  -     Magnesium; Future; Expected date: 06/17/2025  -     amLODIPine (NORVASC) 5 MG tablet; Take 1 tablet (5 mg total) by mouth 2 (two) times daily.  Dispense: 180 tablet; Refill: 2    Chronic diastolic heart failure  -     Comprehensive Metabolic Panel; Future; Expected date: 06/17/2025  -     BNP; Future; Expected date: 12/17/2025  -     Magnesium; Future; Expected date: 06/17/2025    PAF (paroxysmal atrial fibrillation)    Nonrheumatic mitral valve regurgitation    Stage 3a chronic kidney disease    Long term (current) use of anticoagulants    Essential hypertension  -     Comprehensive Metabolic Panel; Future; Expected date: 06/17/2025  -     amLODIPine (NORVASC) 5 MG tablet; Take 1 tablet (5 mg total) by mouth 2 (two) times daily.  Dispense: 180 tablet; Refill: 2  -     lisinopriL (PRINIVIL,ZESTRIL) 20 MG tablet; Take 1 tablet (20 mg total) by mouth 2 (two) times daily.  Dispense: 180 tablet; Refill: 1  -     metoprolol succinate (TOPROL-XL) 50 MG 24 hr tablet; Take 1 tablet (50 mg total) by mouth every evening.  Dispense: 90 tablet; Refill: 1    Obesity, Class I, BMI 30-34.9  -     Comprehensive Metabolic Panel; Future; Expected date: 06/17/2025  -     Lipid Panel; Future; Expected date: 06/17/2025  -     atorvastatin (LIPITOR) 80 MG tablet; Take 1 tablet (80 mg total) by mouth every evening.  Dispense: 90 tablet; Refill: 3    Mixed hyperlipidemia  Comments:  Add ezetimibe  Weight loss for triglycerides  Check lab  Orders:  -     ezetimibe (ZETIA) 10 mg tablet; Take 1 tablet (10 mg total) by mouth once daily.  Dispense: 90 tablet; Refill: 1    Other orders  -     spironolactone (ALDACTONE) 25 MG tablet; Take 0.5 tablets (12.5 mg total) by mouth every other day.  Dispense: 23 tablet; Refill: 1    RTC Low level/low impact aerobic exercise 5x's/wk. Heart healthy diet and  risk factor modification.    See labs and med orders.    Aerobic exercise 5x's/wk. Heart healthy diet and risk factor modification.    See labs and med orders.             [1]   Current Outpatient Medications:     apixaban (ELIQUIS) 5 mg Tab, Take 1 tablet (5 mg total) by mouth 2 (two) times daily., Disp: 180 tablet, Rfl: 1    furosemide (LASIX) 20 MG tablet, Take 1 tablet (20 mg total) by mouth once daily., Disp: 90 tablet, Rfl: 1    nitroGLYCERIN (NITROSTAT) 0.3 MG SL tablet, Place 1 tablet (0.3 mg total) under the tongue every 5 (five) minutes as needed for Chest pain., Disp: 100 tablet, Rfl: 3    pantoprazole (PROTONIX) 40 MG tablet, Take 1 tablet (40 mg total) by mouth once daily., Disp: 90 tablet, Rfl: 0    sertraline (ZOLOFT) 100 MG tablet, TAKE 1 TABLET DAILY, Disp: 90 tablet, Rfl: 3    amLODIPine (NORVASC) 5 MG tablet, Take 1 tablet (5 mg total) by mouth 2 (two) times daily., Disp: 180 tablet, Rfl: 2    aspirin (ECOTRIN) 81 MG EC tablet, Take 1 tablet (81 mg total) by mouth once daily., Disp: , Rfl: 0    atorvastatin (LIPITOR) 80 MG tablet, Take 1 tablet (80 mg total) by mouth every evening., Disp: 90 tablet, Rfl: 3    ezetimibe (ZETIA) 10 mg tablet, Take 1 tablet (10 mg total) by mouth once daily., Disp: 90 tablet, Rfl: 1    lisinopriL (PRINIVIL,ZESTRIL) 20 MG tablet, Take 1 tablet (20 mg total) by mouth 2 (two) times daily., Disp: 180 tablet, Rfl: 1    metoprolol succinate (TOPROL-XL) 50 MG 24 hr tablet, Take 1 tablet (50 mg total) by mouth every evening., Disp: 90 tablet, Rfl: 1    spironolactone (ALDACTONE) 25 MG tablet, Take 0.5 tablets (12.5 mg total) by mouth every other day., Disp: 23 tablet, Rfl: 1

## 2025-06-18 PROBLEM — Z79.01 LONG TERM (CURRENT) USE OF ANTICOAGULANTS: Chronic | Status: ACTIVE | Noted: 2025-06-18

## 2025-07-21 DIAGNOSIS — I50.32 CHRONIC DIASTOLIC HEART FAILURE: Chronic | ICD-10-CM

## 2025-07-21 DIAGNOSIS — R60.9 EDEMA, UNSPECIFIED TYPE: ICD-10-CM

## 2025-07-21 RX ORDER — FUROSEMIDE 20 MG/1
20 TABLET ORAL
Qty: 90 TABLET | Refills: 3 | Status: SHIPPED | OUTPATIENT
Start: 2025-07-21

## 2025-07-24 DIAGNOSIS — K21.9 GASTROESOPHAGEAL REFLUX DISEASE, UNSPECIFIED WHETHER ESOPHAGITIS PRESENT: ICD-10-CM

## 2025-07-25 NOTE — TELEPHONE ENCOUNTER
No care due was identified.  [facility] Embedded Care Due Messages. Reference number: [id number].   7/24/2025 11:27:26 PM CDT

## 2025-07-25 NOTE — TELEPHONE ENCOUNTER
Refill Routing Note   Medication(s) are not appropriate for processing by [facility] for the following reason(s):        New or recently adjusted medication    [facility] action(s):  Defer               Appointments  past 12m or future 3m with PCP    Date Provider   Last Visit   3/14/2025 Flavia Lange MD   Next Visit   Visit date not found Flavia Lange MD   ED visits in past 90 days: 0        Note composed:4:38 PM 07/25/2025

## 2025-07-30 DIAGNOSIS — I48.0 PAF (PAROXYSMAL ATRIAL FIBRILLATION): Primary | ICD-10-CM

## 2025-07-30 RX ORDER — PANTOPRAZOLE SODIUM 40 MG/1
40 TABLET, DELAYED RELEASE ORAL
Qty: 90 TABLET | Refills: 2 | Status: SHIPPED | OUTPATIENT
Start: 2025-07-30

## (undated) DEVICE — DRAPE ABDOMINAL TIBURON 14X11

## (undated) DEVICE — GUIDEWIRE EMERALD 150CM PTFE

## (undated) DEVICE — DRAPE C ARM 42 X 120 10/BX

## (undated) DEVICE — TOURNIQUET SB QC SP 34X4IN

## (undated) DEVICE — SEE MEDLINE ITEM 157150

## (undated) DEVICE — SOL 9P NACL IRR PIC IL

## (undated) DEVICE — CATH RADIAL TIG 6FR 4.0 110CM

## (undated) DEVICE — DRESSING MEPILEX BORDER 4 X 4

## (undated) DEVICE — CORD BIPOLAR 12 FOOT

## (undated) DEVICE — SKIN MARKER DEVON 160

## (undated) DEVICE — SUT MONOCRYL 4-0 PS-2

## (undated) DEVICE — UNDERGLOVES BIOGEL PI SIZE 8

## (undated) DEVICE — OMNIPAQUE 350 200ML

## (undated) DEVICE — SPONGE LAP 4X18 PREWASHED

## (undated) DEVICE — KIT CUSTOM MANIFOLD

## (undated) DEVICE — Device

## (undated) DEVICE — LANZA BLADE

## (undated) DEVICE — ADHESIVE MASTISOL VIAL 48/BX

## (undated) DEVICE — ELECTRODE REM PLYHSV RETURN 9

## (undated) DEVICE — TRANSDUCER ADULT DISP

## (undated) DEVICE — DRESSING SURGICAL 1/2X1/2

## (undated) DEVICE — PAD ELECTRODE STER 1.5X3

## (undated) DEVICE — DRAPE STERI INSTRUMENT 1018

## (undated) DEVICE — KIT SURGIFLO HEMOSTATIC MATRIX

## (undated) DEVICE — DRAPE ANGIO BRACH 38X44IN

## (undated) DEVICE — SUT VICRYL+ 1 CT1 18IN

## (undated) DEVICE — PAD DEFIB CADENCE ADULT R2

## (undated) DEVICE — DRESSING ABSRBNT ISLAND 3.6X8

## (undated) DEVICE — SYR 30CC LUER LOCK

## (undated) DEVICE — UNDERGLOVES BIOGEL PI SIZE 7.5

## (undated) DEVICE — BUR BONE CUT MICRO TPS 3X3.8MM

## (undated) DEVICE — SOL IRR NACL .9% 3000ML

## (undated) DEVICE — TUBE SET INFLOW/OUTFLOW

## (undated) DEVICE — SPIKE CONTRAST CONTROLLER

## (undated) DEVICE — SEE MEDLINE ITEM 157131

## (undated) DEVICE — DRESSING AQUACEL FOAM 5 X 5

## (undated) DEVICE — SEE MEDLINE ITEM 157169

## (undated) DEVICE — GLOVE BIOGEL SKINSENSE PI 8.0

## (undated) DEVICE — SEE MEDLINE ITEM 156905

## (undated) DEVICE — SYS CLSR DERMABOND PRINEO 22CM

## (undated) DEVICE — NDL SPINAL 18GX3.5 SPINOCAN

## (undated) DEVICE — GLOVE BIOGEL SKINSENSE PI 7.5

## (undated) DEVICE — NDL 18GA X1 1/2 REG BEVEL

## (undated) DEVICE — DRESSING TRANS 4X4 TEGADERM

## (undated) DEVICE — SUT STRATAFIX 3-0 30CM

## (undated) DEVICE — PAD ABD 8X10 STERILE

## (undated) DEVICE — GAUZE SPONGE 4X4 12PLY

## (undated) DEVICE — SEE MEDLINE ITEM 146347

## (undated) DEVICE — SEE MEDLINE ITEM 157117

## (undated) DEVICE — PAD COLD THERAPY KNEE WRAP ON

## (undated) DEVICE — SUT VICRYL PLUS 2-0 CT1 18

## (undated) DEVICE — SUTURE STRATAFIX PGA PCL 3-0

## (undated) DEVICE — CLOSURE SKIN 1X5 STERI-STRIP

## (undated) DEVICE — APPLICATOR CHLORAPREP ORN 26ML

## (undated) DEVICE — MARKER SKIN STND TIP BLUE BARR

## (undated) DEVICE — TRAY CATH LAB OMC

## (undated) DEVICE — DRAPE STERI U-SHAPED 47X51IN

## (undated) DEVICE — HEMOSTAT VASC BAND REG 24CM

## (undated) DEVICE — UNDERGLOVES BIOGEL PI SZ 7 LF

## (undated) DEVICE — DRAPE STERI-DRAPE 1000 17X11IN

## (undated) DEVICE — KIT GLIDESHEATH SLEND 6FR 10CM

## (undated) DEVICE — DRESSING XEROFORM FOIL PK 1X8

## (undated) DEVICE — PAD CAST SPECIALIST STRL 6

## (undated) DEVICE — SEE MEDLINE ITEM 146417

## (undated) DEVICE — ELECTRODE BLADE INSULATED 1 IN

## (undated) DEVICE — CLOSURE SKIN STERI STRIP 1/2X4